# Patient Record
Sex: MALE | Race: WHITE | Employment: OTHER | URBAN - METROPOLITAN AREA
[De-identification: names, ages, dates, MRNs, and addresses within clinical notes are randomized per-mention and may not be internally consistent; named-entity substitution may affect disease eponyms.]

---

## 2018-10-20 ENCOUNTER — APPOINTMENT (OUTPATIENT)
Dept: CT IMAGING | Age: 83
End: 2018-10-20
Payer: MEDICARE

## 2018-10-20 ENCOUNTER — HOSPITAL ENCOUNTER (OUTPATIENT)
Age: 83
Setting detail: OBSERVATION
Discharge: HOME | End: 2018-10-22
Attending: EMERGENCY MEDICINE | Admitting: FAMILY MEDICINE
Payer: MEDICARE

## 2018-10-20 ENCOUNTER — APPOINTMENT (OUTPATIENT)
Dept: GENERAL RADIOLOGY | Age: 83
End: 2018-10-20
Payer: MEDICARE

## 2018-10-20 DIAGNOSIS — R07.9 CHEST PAIN, UNSPECIFIED TYPE: Primary | ICD-10-CM

## 2018-10-20 LAB
ANION GAP SERPL CALCULATED.3IONS-SCNC: 13 MMOL/L (ref 7–16)
APTT: 28.3 SEC (ref 24.5–35.1)
BASOPHILS ABSOLUTE: 0.04 E9/L (ref 0–0.2)
BASOPHILS RELATIVE PERCENT: 0.5 % (ref 0–2)
BUN BLDV-MCNC: 13 MG/DL (ref 8–23)
CALCIUM SERPL-MCNC: 8.7 MG/DL (ref 8.6–10.2)
CHLORIDE BLD-SCNC: 105 MMOL/L (ref 98–107)
CO2: 24 MMOL/L (ref 22–29)
CREAT SERPL-MCNC: 1.2 MG/DL (ref 0.7–1.2)
EKG ATRIAL RATE: 60 BPM
EKG P AXIS: 66 DEGREES
EKG P-R INTERVAL: 188 MS
EKG Q-T INTERVAL: 526 MS
EKG QRS DURATION: 196 MS
EKG QTC CALCULATION (BAZETT): 526 MS
EKG R AXIS: -59 DEGREES
EKG T AXIS: 96 DEGREES
EKG VENTRICULAR RATE: 60 BPM
EOSINOPHILS ABSOLUTE: 0.26 E9/L (ref 0.05–0.5)
EOSINOPHILS RELATIVE PERCENT: 3.6 % (ref 0–6)
GFR AFRICAN AMERICAN: >60
GFR NON-AFRICAN AMERICAN: 58 ML/MIN/1.73
GLUCOSE BLD-MCNC: 153 MG/DL (ref 74–109)
HCT VFR BLD CALC: 40.2 % (ref 37–54)
HEMOGLOBIN: 13 G/DL (ref 12.5–16.5)
IMMATURE GRANULOCYTES #: 0.01 E9/L
IMMATURE GRANULOCYTES %: 0.1 % (ref 0–5)
INR BLD: 1.1
LYMPHOCYTES ABSOLUTE: 2.43 E9/L (ref 1.5–4)
LYMPHOCYTES RELATIVE PERCENT: 33.4 % (ref 20–42)
MCH RBC QN AUTO: 29.7 PG (ref 26–35)
MCHC RBC AUTO-ENTMCNC: 32.3 % (ref 32–34.5)
MCV RBC AUTO: 92 FL (ref 80–99.9)
METER GLUCOSE: 131 MG/DL (ref 70–110)
METER GLUCOSE: 150 MG/DL (ref 70–110)
MONOCYTES ABSOLUTE: 0.69 E9/L (ref 0.1–0.95)
MONOCYTES RELATIVE PERCENT: 9.5 % (ref 2–12)
NEUTROPHILS ABSOLUTE: 3.85 E9/L (ref 1.8–7.3)
NEUTROPHILS RELATIVE PERCENT: 52.9 % (ref 43–80)
PDW BLD-RTO: 13 FL (ref 11.5–15)
PLATELET # BLD: 205 E9/L (ref 130–450)
PMV BLD AUTO: 9.8 FL (ref 7–12)
POTASSIUM SERPL-SCNC: 3.6 MMOL/L (ref 3.5–5)
PRO-BNP: 1205 PG/ML (ref 0–450)
PROTHROMBIN TIME: 12 SEC (ref 9.3–12.4)
RBC # BLD: 4.37 E12/L (ref 3.8–5.8)
SODIUM BLD-SCNC: 142 MMOL/L (ref 132–146)
TROPONIN: 0.08 NG/ML (ref 0–0.03)
TROPONIN: <0.01 NG/ML (ref 0–0.03)
WBC # BLD: 7.3 E9/L (ref 4.5–11.5)

## 2018-10-20 PROCEDURE — G0378 HOSPITAL OBSERVATION PER HR: HCPCS

## 2018-10-20 PROCEDURE — 6370000000 HC RX 637 (ALT 250 FOR IP): Performed by: FAMILY MEDICINE

## 2018-10-20 PROCEDURE — 36415 COLL VENOUS BLD VENIPUNCTURE: CPT

## 2018-10-20 PROCEDURE — 80048 BASIC METABOLIC PNL TOTAL CA: CPT

## 2018-10-20 PROCEDURE — 99285 EMERGENCY DEPT VISIT HI MDM: CPT

## 2018-10-20 PROCEDURE — 83880 ASSAY OF NATRIURETIC PEPTIDE: CPT

## 2018-10-20 PROCEDURE — 84484 ASSAY OF TROPONIN QUANT: CPT

## 2018-10-20 PROCEDURE — 71045 X-RAY EXAM CHEST 1 VIEW: CPT

## 2018-10-20 PROCEDURE — 93005 ELECTROCARDIOGRAM TRACING: CPT | Performed by: INTERNAL MEDICINE

## 2018-10-20 PROCEDURE — 6360000002 HC RX W HCPCS: Performed by: FAMILY MEDICINE

## 2018-10-20 PROCEDURE — 96372 THER/PROPH/DIAG INJ SC/IM: CPT

## 2018-10-20 PROCEDURE — 6360000002 HC RX W HCPCS: Performed by: INTERNAL MEDICINE

## 2018-10-20 PROCEDURE — 2580000003 HC RX 258: Performed by: RADIOLOGY

## 2018-10-20 PROCEDURE — 85025 COMPLETE CBC W/AUTO DIFF WBC: CPT

## 2018-10-20 PROCEDURE — 85730 THROMBOPLASTIN TIME PARTIAL: CPT

## 2018-10-20 PROCEDURE — 93010 ELECTROCARDIOGRAM REPORT: CPT | Performed by: INTERNAL MEDICINE

## 2018-10-20 PROCEDURE — 96374 THER/PROPH/DIAG INJ IV PUSH: CPT

## 2018-10-20 PROCEDURE — 85610 PROTHROMBIN TIME: CPT

## 2018-10-20 PROCEDURE — 6360000004 HC RX CONTRAST MEDICATION: Performed by: RADIOLOGY

## 2018-10-20 PROCEDURE — 71275 CT ANGIOGRAPHY CHEST: CPT

## 2018-10-20 PROCEDURE — 82962 GLUCOSE BLOOD TEST: CPT

## 2018-10-20 RX ORDER — ACETAMINOPHEN 325 MG/1
650 TABLET ORAL EVERY 4 HOURS PRN
Status: DISCONTINUED | OUTPATIENT
Start: 2018-10-20 | End: 2018-10-22 | Stop reason: HOSPADM

## 2018-10-20 RX ORDER — ATORVASTATIN CALCIUM 80 MG/1
80 TABLET, FILM COATED ORAL NIGHTLY
COMMUNITY

## 2018-10-20 RX ORDER — CHOLECALCIFEROL (VITAMIN D3) 125 MCG
500 CAPSULE ORAL DAILY
COMMUNITY

## 2018-10-20 RX ORDER — FUROSEMIDE 10 MG/ML
20 INJECTION INTRAMUSCULAR; INTRAVENOUS ONCE
Status: COMPLETED | OUTPATIENT
Start: 2018-10-20 | End: 2018-10-20

## 2018-10-20 RX ORDER — MYCOPHENOLATE MOFETIL 250 MG/1
CAPSULE ORAL 2 TIMES DAILY
COMMUNITY

## 2018-10-20 RX ORDER — SODIUM CHLORIDE 0.9 % (FLUSH) 0.9 %
10 SYRINGE (ML) INJECTION ONCE
Status: COMPLETED | OUTPATIENT
Start: 2018-10-20 | End: 2018-10-20

## 2018-10-20 RX ORDER — MYCOPHENOLATE MOFETIL 250 MG/1
500 CAPSULE ORAL 2 TIMES DAILY
Status: DISCONTINUED | OUTPATIENT
Start: 2018-10-20 | End: 2018-10-22 | Stop reason: HOSPADM

## 2018-10-20 RX ORDER — TAMSULOSIN HYDROCHLORIDE 0.4 MG/1
0.4 CAPSULE ORAL DAILY
Status: DISCONTINUED | OUTPATIENT
Start: 2018-10-20 | End: 2018-10-22 | Stop reason: HOSPADM

## 2018-10-20 RX ORDER — CLOPIDOGREL BISULFATE 75 MG/1
75 TABLET ORAL DAILY
Status: DISCONTINUED | OUTPATIENT
Start: 2018-10-20 | End: 2018-10-22 | Stop reason: HOSPADM

## 2018-10-20 RX ORDER — LANOLIN ALCOHOL/MO/W.PET/CERES
800 CREAM (GRAM) TOPICAL DAILY
COMMUNITY

## 2018-10-20 RX ORDER — CLOPIDOGREL BISULFATE 75 MG/1
75 TABLET ORAL DAILY
COMMUNITY

## 2018-10-20 RX ORDER — PANTOPRAZOLE SODIUM 40 MG/1
40 TABLET, DELAYED RELEASE ORAL
Status: DISCONTINUED | OUTPATIENT
Start: 2018-10-21 | End: 2018-10-22 | Stop reason: HOSPADM

## 2018-10-20 RX ORDER — ATORVASTATIN CALCIUM 40 MG/1
80 TABLET, FILM COATED ORAL NIGHTLY
Status: DISCONTINUED | OUTPATIENT
Start: 2018-10-20 | End: 2018-10-22 | Stop reason: HOSPADM

## 2018-10-20 RX ORDER — CHOLECALCIFEROL (VITAMIN D3) 50 MCG
5000 TABLET ORAL DAILY
Status: DISCONTINUED | OUTPATIENT
Start: 2018-10-20 | End: 2018-10-22 | Stop reason: HOSPADM

## 2018-10-20 RX ORDER — FOLIC ACID 1 MG/1
1000 TABLET ORAL DAILY
Status: DISCONTINUED | OUTPATIENT
Start: 2018-10-20 | End: 2018-10-22 | Stop reason: HOSPADM

## 2018-10-20 RX ORDER — FINASTERIDE 5 MG/1
5 TABLET, FILM COATED ORAL DAILY
Status: DISCONTINUED | OUTPATIENT
Start: 2018-10-20 | End: 2018-10-22 | Stop reason: HOSPADM

## 2018-10-20 RX ORDER — LANOLIN ALCOHOL/MO/W.PET/CERES
500 CREAM (GRAM) TOPICAL DAILY
Status: DISCONTINUED | OUTPATIENT
Start: 2018-10-20 | End: 2018-10-22 | Stop reason: HOSPADM

## 2018-10-20 RX ORDER — M-VIT,TX,IRON,MINS/CALC/FOLIC 27MG-0.4MG
1 TABLET ORAL DAILY
Status: DISCONTINUED | OUTPATIENT
Start: 2018-10-20 | End: 2018-10-22 | Stop reason: HOSPADM

## 2018-10-20 RX ORDER — ASPIRIN 81 MG/1
81 TABLET, CHEWABLE ORAL DAILY
Status: DISCONTINUED | OUTPATIENT
Start: 2018-10-20 | End: 2018-10-22 | Stop reason: HOSPADM

## 2018-10-20 RX ADMIN — CLOPIDOGREL BISULFATE 75 MG: 75 TABLET ORAL at 15:34

## 2018-10-20 RX ADMIN — INSULIN LISPRO 1 UNITS: 100 INJECTION, SOLUTION INTRAVENOUS; SUBCUTANEOUS at 21:56

## 2018-10-20 RX ADMIN — Medication 5000 UNITS: at 15:34

## 2018-10-20 RX ADMIN — Medication 500 MCG: at 15:35

## 2018-10-20 RX ADMIN — FUROSEMIDE 20 MG: 10 INJECTION, SOLUTION INTRAMUSCULAR; INTRAVENOUS at 15:38

## 2018-10-20 RX ADMIN — Medication 1000 MCG: at 15:34

## 2018-10-20 RX ADMIN — DESMOPRESSIN ACETATE 80 MG: 0.2 TABLET ORAL at 21:50

## 2018-10-20 RX ADMIN — ENOXAPARIN SODIUM 40 MG: 40 INJECTION SUBCUTANEOUS at 14:15

## 2018-10-20 RX ADMIN — MULTIPLE VITAMINS W/ MINERALS TAB 1 TABLET: TAB at 15:34

## 2018-10-20 RX ADMIN — METOPROLOL TARTRATE 25 MG: 25 TABLET ORAL at 15:34

## 2018-10-20 RX ADMIN — TAMSULOSIN HYDROCHLORIDE 0.4 MG: 0.4 CAPSULE ORAL at 21:52

## 2018-10-20 RX ADMIN — Medication 10 ML: at 09:25

## 2018-10-20 RX ADMIN — ACETAMINOPHEN 650 MG: 325 TABLET ORAL at 14:15

## 2018-10-20 RX ADMIN — FINASTERIDE 5 MG: 5 TABLET, FILM COATED ORAL at 15:33

## 2018-10-20 RX ADMIN — ASPIRIN 81 MG CHEWABLE TABLET 81 MG: 81 TABLET CHEWABLE at 15:34

## 2018-10-20 RX ADMIN — MYCOPHENOLATE MOFETIL 500 MG: 250 CAPSULE ORAL at 15:35

## 2018-10-20 RX ADMIN — MYCOPHENOLATE MOFETIL 500 MG: 250 CAPSULE ORAL at 21:50

## 2018-10-20 RX ADMIN — IOPAMIDOL 65 ML: 755 INJECTION, SOLUTION INTRAVENOUS at 09:25

## 2018-10-20 ASSESSMENT — PAIN DESCRIPTION - ORIENTATION
ORIENTATION: MID
ORIENTATION: MID
ORIENTATION: RIGHT;LEFT

## 2018-10-20 ASSESSMENT — PAIN SCALES - GENERAL
PAINLEVEL_OUTOF10: 3
PAINLEVEL_OUTOF10: 7
PAINLEVEL_OUTOF10: 0
PAINLEVEL_OUTOF10: 0
PAINLEVEL_OUTOF10: 7
PAINLEVEL_OUTOF10: 0
PAINLEVEL_OUTOF10: 7
PAINLEVEL_OUTOF10: 7

## 2018-10-20 ASSESSMENT — ENCOUNTER SYMPTOMS
RHINORRHEA: 0
TROUBLE SWALLOWING: 0
EYE REDNESS: 0
EYE PAIN: 0
CHEST TIGHTNESS: 0
WHEEZING: 0
ABDOMINAL PAIN: 0
SORE THROAT: 0
ABDOMINAL DISTENTION: 0
DIARRHEA: 0
CONSTIPATION: 0
SHORTNESS OF BREATH: 1
SINUS PRESSURE: 0
COUGH: 0
PHOTOPHOBIA: 0
BLOOD IN STOOL: 0
NAUSEA: 1
BACK PAIN: 0
VOMITING: 0

## 2018-10-20 ASSESSMENT — PAIN DESCRIPTION - FREQUENCY
FREQUENCY: CONTINUOUS

## 2018-10-20 ASSESSMENT — PAIN DESCRIPTION - DESCRIPTORS
DESCRIPTORS: ACHING

## 2018-10-20 ASSESSMENT — PAIN DESCRIPTION - LOCATION
LOCATION: HEAD
LOCATION: ARM;NECK;SHOULDER
LOCATION: HEAD

## 2018-10-20 ASSESSMENT — PAIN DESCRIPTION - PAIN TYPE
TYPE: ACUTE PAIN

## 2018-10-20 ASSESSMENT — PAIN DESCRIPTION - ONSET: ONSET: SUDDEN

## 2018-10-20 ASSESSMENT — PAIN DESCRIPTION - PROGRESSION: CLINICAL_PROGRESSION: GRADUALLY WORSENING

## 2018-10-20 NOTE — CONSULTS
Inpatient Cardiology Consultation      Reason for Consult:  Chest pain    Consulting Physician: Dr. Layton [facility]    Requesting Physician:  Dr. Matthias Ortega    Date of Consultation: 10/20/2018    History of Present Illness:    Mr. Kendrick Lopez is an 81year old gentleman who is previously unknown to our practice; his primary cardiologist is Dr. Rakesh Lee at [facility]. He has a past medical history of aortic stenosis s/p bioprosthetic AVR and single vessel bypass in 2015; dual chamber pacemaker implant in 2016; hypertension; hyperlipidemia; diabetes mellitus; and TIA. He is typically active with yard work, and reports that he sometimes is outside performing chores for three hours before stopping due to fatigue and dyspnea; however, he sometimes uses a cane due to arthritic discomfort and poor balance. He denies chest discomfort with his regular activities. Yesterday, he had some \"discomfort\" near the bottom of his sternotomy incision, which occurred at rest and lasted a few minutes before resolving spontaneously. He was able to attend a football game last night, and felt well. This morning, upon waking he urinated a great deal, and became lightheaded, feeling as though he \"was going to faint\". He then began having discomfort in the center of his chest, both shoulders and upper arms to his elbows. He returned to bed, and the lightheadedness improved but he continued to have the discomfort, which was made worse with movement. When his wife returned to the room, he was diaphoretic and sweating through his T shirt. She called EMS, and he was hypertensive on their arrival (by his wife's account his systolic blood pressure was over 200). His chest discomfort had mostly resolved before their arrival, and lasted about thirty minutes. In the ED, initial troponin was negative and CTA of the chest was negative for PE. He was admitted to telemetry, and [facility] was consulted regarding his chest pain.  Currently, Mr. Kendrick Lopez is resting in bed embolism in 2012 (? Unprovoked). He was treated with warfarin for six months. 11. 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 Basal cell carcinoma    Medications Prior to Admit:  Prior to Admission medications    Medication Sig Start Date End Date Taking?  Authorizing Provider   atorvastatin (LIPITOR) 80 MG tablet Take 80 mg by mouth nightly   Yes Historical Provider, MD   vitamin B-12 (CYANOCOBALAMIN) 500 MCG tablet Take 500 mcg by mouth daily   Yes Historical Provider, MD   mycophenolate (CELLCEPT) 250 MG capsule Take by mouth 2 times daily 500mg in the morning and 250mg at night   Yes Historical Provider, MD   folic acid (FOLVITE) 946 MCG tablet Take 800 mcg by mouth daily   Yes Historical Provider, MD   clopidogrel (PLAVIX) 75 MG tablet Take 75 mg by mouth daily   Yes Historical Provider, MD   aspirin 81 MG tablet Take 81 mg by mouth daily   Yes Historical Provider, MD   vitamin D-3 (CHOLECALCIFEROL) 5000 UNITS TABS Take 5,000 Units by mouth daily   Yes Historical Provider, MD   finasteride (PROSCAR) 5 MG tablet Take 5 mg by mouth daily   Yes Historical Provider, MD   metFORMIN (GLUCOPHAGE) 500 MG tablet Take 500 mg by mouth 2 times daily (with meals)   Yes Historical Provider, MD   metoprolol (LOPRESSOR) 25 MG tablet Take 25 mg by mouth 2 times daily   Yes Historical Provider, MD   omeprazole (PRILOSEC) 20 MG capsule Take 20 mg by mouth 2 times daily   Yes Historical Provider, MD   tamsulosin (FLOMAX) 0.4 MG capsule Take 0.4 mg by mouth daily   Yes Historical Provider, MD   Multiple Vitamins-Minerals (THERAPEUTIC MULTIVITAMIN-MINERALS) tablet Take 1 tablet by mouth daily   Yes Historical Provider, MD       Current Medications:    Current Facility-Administered Medications: enoxaparin (LOVENOX) injection 40 mg, 40 mg, Subcutaneous, Daily  insulin lispro (HUMALOG) injection vial 0-6 Units, 0-6 Units, Subcutaneous, TID WC  insulin lispro (HUMALOG) injection vial 0-3 Units, along with that of neck, shoulder and arm discomfort of a prolonged nature with initial normal cardiac biomarkers. In addition an episode of near syncope potentially of a vasodepressor mediated origin was suggested. Objective basis, his chest x-ray suggests that of mild interstitial infiltrates potentially compatible with that of congestive heart failure. On this basis, a proBNP will be obtained along with a single dose of diuretics with repeat cardiac biomarkers to be obtained to further evaluate the presence or absence of a potential ischemic syndrome. In addition based on his near syncopal episode in light of the presence of a pacemaker, this will be interrogated utilizing the [employer] consult system to determine the presence or absence of arrhythmia related events necessitating further evaluation. On the basis of his symptoms and pacemaker dependence, an echocardiogram will be obtained to assess left ventricular systolic function to guide any additional needs of alteration of his management.     Thank you for allowing me to participate in your patient's care. Please feel free to contact me if you have any questions or concerns.     Armando Hussein [place], [facility]

## 2018-10-20 NOTE — CONSULTS
dose of diuretics with repeat cardiac biomarkers to be obtained to further evaluate the presence or absence of a potential ischemic syndrome. In addition based on his near syncopal episode in light of the presence of a pacemaker, this will be interrogated utilizing the [email or web address] consult system to determine the presence or absence of arrhythmia related events necessitating further evaluation. On the basis of his symptoms and pacemaker dependence, an echocardiogram will be obtained to assess left ventricular systolic function to guide any additional needs of alteration of his management. Thank you for allowing me to participate in your patient's care. Please feel free to contact me if you have any questions or concerns. Pérez Ruvalcaba.  Pradip Peres, [address] [facility]

## 2018-10-20 NOTE — ED PROVIDER NOTES
admit patient for further evaluation and treatment. Repeat exam prior to admission unchanged, patient resting in bed in no acute distress with wife at bedside, no new complaints prior to admission. 9:47 AM-- patient resting comfortably in bed in no acute distress. Patient with no active chest pain. On repeat exam, no change. 10:31 AM-- patient resting comfortably in bed in no acute distress, wife at bedside. No new complaints at this time. 11:42 AM--patient with no active chest pain while in ED, CTA chest no evidence of pulmonary embolism. --------------------------------------------- PAST HISTORY ---------------------------------------------  Past Medical History:  has a past medical history of Diabetes mellitus ([facility] [address] Elevated cholesterol; Hypertension; and Prostate enlargement. Past Surgical History:  has a past surgical history that includes Cardiac surgery (07/06/2015). Social History:  reports that he has quit smoking. He has never used smokeless tobacco. He reports that he drinks alcohol. He reports that he does not use drugs. Family History: family history is not on file. The patients home medications have been reviewed. Allergies: Patient has no known allergies.     -------------------------------------------------- RESULTS -------------------------------------------------    LABS:  Results for orders placed or performed during the hospital encounter of 10/20/18   CBC auto differential   Result Value Ref Range    WBC 7.3 4.5 - 11.5 E9/L    RBC 4.37 3.80 - 5.80 E12/L    Hemoglobin 13.0 12.5 - 16.5 g/dL    Hematocrit 40.2 37.0 - 54.0 %    MCV 92.0 80.0 - 99.9 fL    MCH 29.7 26.0 - 35.0 pg    MCHC 32.3 32.0 - 34.5 %    RDW 13.0 11.5 - 15.0 fL    Platelets 356 337 - 240 E9/L    MPV 9.8 7.0 - 12.0 fL    Neutrophils % 52.9 43.0 - 80.0 %    Immature Granulocytes % 0.1 0.0 - 5.0 %    Lymphocytes % 33.4 20.0 - 42.0 %    Monocytes % 9.5 2.0 - 12.0 %    Eosinophils % 3.6 0.0 - 6.0 % Basophils % 0.5 0.0 - 2.0 %    Neutrophils # 3.85 1.80 - 7.30 E9/L    Immature Granulocytes # 0.01 E9/L    Lymphocytes # 2.43 1.50 - 4.00 E9/L    Monocytes # 0.69 0.10 - 0.95 E9/L    Eosinophils # 0.26 0.05 - 0.50 E9/L    Basophils # 0.04 0.00 - 0.20 Q9/C   Basic Metabolic Panel   Result Value Ref Range    Sodium 142 132 - 146 mmol/L    Potassium 3.6 3.5 - 5.0 mmol/L    Chloride 105 98 - 107 mmol/L    CO2 24 22 - 29 mmol/L    Anion Gap 13 7 - 16 mmol/L    Glucose 153 (H) 74 - 109 mg/dL    BUN 13 8 - 23 mg/dL    CREATININE 1.2 0.7 - 1.2 mg/dL    GFR Non-African American 58 >=60 mL/min/1.73    GFR African American >60     Calcium 8.7 8.6 - 10.2 mg/dL   Troponin   Result Value Ref Range    Troponin <0.01 0.00 - 0.03 ng/mL   Protime-INR   Result Value Ref Range    Protime 12.0 9.3 - 12.4 sec    INR 1.1    APTT   Result Value Ref Range    aPTT 28.3 24.5 - 35.1 sec       RADIOLOGY:  CTA CHEST W CONTRAST   Final Result   the kidneys representing renal cysts. IMPRESSION:   1. No evidence to suggest pulmonary arterial emboli. 2. Cardiomegaly. Mild perihilar vascular congestion. Status post prior   CABG and aortic valve replacement. 3. Mild to moderate consolidation and airspace opacities of the left   lower lobe could represent atelectasis or infiltrates. 4. Left chest wall cardiac pacer. XR CHEST PORTABLE   Final Result   Hazy airspace disease at the lung fields bilaterally. Recommend clinical correlation. EKG: This EKG is signed and interpreted by me. Rate: 60  Rhythm: AV dual paced rhythm  Interpretation: No acute ST or T-wave morphology changes  Comparison: No previous EKG for comparison    ------------------------- NURSING NOTES AND VITALS REVIEWED ---------------------------  Date / Time Roomed:  10/20/2018  7:34 AM  ED Bed Assignment:  20/20    The nursing notes within the ED encounter and vital signs as below have been reviewed.      Patient Vitals for the past 24 hrs:   BP Temp Temp src Pulse Resp SpO2 Height Weight   10/20/18 1043 (!) 147/68 - - 60 18 98 % - -   10/20/18 0858 122/63 - - 60 18 97 % - -   10/20/18 0738 (!) 168/84 97.6 °F (36.4 °C) Oral 62 18 99 % 5' 6\" (1.676 m) 190 lb (86.2 kg)       Oxygen Saturation Interpretation: Normal    ------------------------------------------ PROGRESS NOTES ------------------------------------------  Re-evaluation(s):  Time: 10:01 AM  Patients symptoms show no change  Repeat physical examination is not changed    Counseling:  I have spoken with the patient and discussed todays results, in addition to providing specific details for the plan of care and counseling regarding the diagnosis and prognosis. Their questions are answered at this time and they are agreeable with the plan of admission.    --------------------------------- ADDITIONAL PROVIDER NOTES ---------------------------------  Consultations:  Time: 12:03 PM. Spoke with Dr. Fatimah Beck. Discussed case. They will admit the patient. This patient's ED course included: a personal history and physicial examination, re-evaluation prior to disposition, multiple bedside re-evaluations, cardiac monitoring and continuous pulse oximetry    This patient has remained hemodynamically stable during their ED course. Diagnosis:  1. Chest pain, unspecified type        Disposition:  Patient's disposition: Admit to telemetry  Patient's condition is stable.          Stephen Boyd DO  Resident  10/20/18 8863

## 2018-10-21 ENCOUNTER — APPOINTMENT (OUTPATIENT)
Dept: NUCLEAR MEDICINE | Age: 83
End: 2018-10-21
Payer: MEDICARE

## 2018-10-21 LAB
LV EF: 49 %
LVEF MODALITY: NORMAL
METER GLUCOSE: 115 MG/DL (ref 70–110)
METER GLUCOSE: 117 MG/DL (ref 70–110)
METER GLUCOSE: 127 MG/DL (ref 70–110)
METER GLUCOSE: 184 MG/DL (ref 70–110)
TROPONIN: 0.08 NG/ML (ref 0–0.03)

## 2018-10-21 PROCEDURE — 6370000000 HC RX 637 (ALT 250 FOR IP): Performed by: FAMILY MEDICINE

## 2018-10-21 PROCEDURE — 84484 ASSAY OF TROPONIN QUANT: CPT

## 2018-10-21 PROCEDURE — 99215 OFFICE O/P EST HI 40 MIN: CPT | Performed by: INTERNAL MEDICINE

## 2018-10-21 PROCEDURE — G0378 HOSPITAL OBSERVATION PER HR: HCPCS

## 2018-10-21 PROCEDURE — 6360000002 HC RX W HCPCS: Performed by: FAMILY MEDICINE

## 2018-10-21 PROCEDURE — A9500 TC99M SESTAMIBI: HCPCS | Performed by: RADIOLOGY

## 2018-10-21 PROCEDURE — 78452 HT MUSCLE IMAGE SPECT MULT: CPT

## 2018-10-21 PROCEDURE — 93017 CV STRESS TEST TRACING ONLY: CPT

## 2018-10-21 PROCEDURE — 82962 GLUCOSE BLOOD TEST: CPT

## 2018-10-21 PROCEDURE — 3430000000 HC RX DIAGNOSTIC RADIOPHARMACEUTICAL: Performed by: RADIOLOGY

## 2018-10-21 PROCEDURE — 93018 CV STRESS TEST I&R ONLY: CPT | Performed by: INTERNAL MEDICINE

## 2018-10-21 PROCEDURE — 36415 COLL VENOUS BLD VENIPUNCTURE: CPT

## 2018-10-21 PROCEDURE — 93016 CV STRESS TEST SUPVJ ONLY: CPT | Performed by: INTERNAL MEDICINE

## 2018-10-21 PROCEDURE — 96372 THER/PROPH/DIAG INJ SC/IM: CPT

## 2018-10-21 PROCEDURE — 6360000002 HC RX W HCPCS: Performed by: INTERNAL MEDICINE

## 2018-10-21 RX ORDER — DEXTROSE MONOHYDRATE 25 G/50ML
12.5 INJECTION, SOLUTION INTRAVENOUS PRN
Status: DISCONTINUED | OUTPATIENT
Start: 2018-10-21 | End: 2018-10-22 | Stop reason: HOSPADM

## 2018-10-21 RX ORDER — DEXTROSE MONOHYDRATE 50 MG/ML
100 INJECTION, SOLUTION INTRAVENOUS PRN
Status: DISCONTINUED | OUTPATIENT
Start: 2018-10-21 | End: 2018-10-22 | Stop reason: HOSPADM

## 2018-10-21 RX ORDER — NICOTINE POLACRILEX 4 MG
15 LOZENGE BUCCAL PRN
Status: DISCONTINUED | OUTPATIENT
Start: 2018-10-21 | End: 2018-10-22 | Stop reason: HOSPADM

## 2018-10-21 RX ADMIN — ASPIRIN 81 MG CHEWABLE TABLET 81 MG: 81 TABLET CHEWABLE at 09:05

## 2018-10-21 RX ADMIN — MYCOPHENOLATE MOFETIL 500 MG: 250 CAPSULE ORAL at 09:05

## 2018-10-21 RX ADMIN — Medication 500 MCG: at 09:04

## 2018-10-21 RX ADMIN — REGADENOSON 0.4 MG: 0.08 INJECTION, SOLUTION INTRAVENOUS at 12:40

## 2018-10-21 RX ADMIN — ENOXAPARIN SODIUM 40 MG: 40 INJECTION SUBCUTANEOUS at 09:15

## 2018-10-21 RX ADMIN — TAMSULOSIN HYDROCHLORIDE 0.4 MG: 0.4 CAPSULE ORAL at 21:39

## 2018-10-21 RX ADMIN — FINASTERIDE 5 MG: 5 TABLET, FILM COATED ORAL at 09:04

## 2018-10-21 RX ADMIN — DESMOPRESSIN ACETATE 80 MG: 0.2 TABLET ORAL at 21:39

## 2018-10-21 RX ADMIN — METOPROLOL TARTRATE 25 MG: 25 TABLET ORAL at 09:04

## 2018-10-21 RX ADMIN — MULTIPLE VITAMINS W/ MINERALS TAB 1 TABLET: TAB at 09:05

## 2018-10-21 RX ADMIN — Medication 5000 UNITS: at 09:04

## 2018-10-21 RX ADMIN — METOPROLOL TARTRATE 25 MG: 25 TABLET ORAL at 21:39

## 2018-10-21 RX ADMIN — PANTOPRAZOLE SODIUM 40 MG: 40 TABLET, DELAYED RELEASE ORAL at 06:25

## 2018-10-21 RX ADMIN — MYCOPHENOLATE MOFETIL 500 MG: 250 CAPSULE ORAL at 21:39

## 2018-10-21 RX ADMIN — Medication 12 MILLICURIE: at 10:37

## 2018-10-21 RX ADMIN — Medication 35 MILLICURIE: at 12:41

## 2018-10-21 RX ADMIN — CLOPIDOGREL BISULFATE 75 MG: 75 TABLET ORAL at 09:05

## 2018-10-21 RX ADMIN — Medication 1000 MCG: at 09:04

## 2018-10-21 ASSESSMENT — PAIN SCALES - GENERAL
PAINLEVEL_OUTOF10: 0

## 2018-10-21 ASSESSMENT — ENCOUNTER SYMPTOMS
BLURRED VISION: 0
ABDOMINAL PAIN: 0
SHORTNESS OF BREATH: 0

## 2018-10-21 NOTE — H&P
500 mcg Oral Daily Tiffanie Xiong MD   500 mcg at 10/20/18 1535    vitamin D tablet 5,000 Units  5,000 Units Oral Daily Tiffanie Xiong MD   5,000 Units at 10/20/18 1534    enoxaparin (LOVENOX) injection 40 mg  40 mg Subcutaneous Daily Tiffanie Xiong MD   40 mg at 10/20/18 1415    insulin lispro (HUMALOG) injection vial 0-6 Units  0-6 Units Subcutaneous TID WC Tiffanie Xiong MD        insulin lispro (HUMALOG) injection vial 0-3 Units  0-3 Units Subcutaneous Nightly Tiffanie Xiong MD   1 Units at 10/20/18 2156    acetaminophen (TYLENOL) tablet 650 mg  650 mg Oral Q4H PRN Tiffanie Xiong MD   650 mg at 10/20/18 1415    perflutren lipid microspheres (DEFINITY) injection 1.65 mg  1.5 mL Intravenous ONCE PRN Shaka Maier MD        regadenoson [facility]) injection 0.4 mg  0.4 mg Intravenous ONCE PRN Shaka Maier MD           Allergies: No Known Allergies    Active Problems:    Chest pain  Resolved Problems:    * No resolved hospital problems. *    Blood pressure 131/71, pulse 71, temperature 98.6 °F (37 °C), temperature source Temporal, resp. rate 16, height 5' 6\" (1.676 m), weight 176 lb (79.8 kg), SpO2 95 %. Review of Systems   Constitutional: Negative for chills and fever. HENT: Negative for hearing loss. Eyes: Negative for blurred vision. Respiratory: Negative for shortness of breath. Cardiovascular: Positive for chest pain. Gastrointestinal: Negative for abdominal pain. Genitourinary: Negative for dysuria. Musculoskeletal: Negative for myalgias. Skin: Negative for rash. Neurological: Negative for dizziness. Psychiatric/Behavioral: Negative for depression. Physical Exam   Constitutional: He is oriented to person, place, and time. He appears well-developed. HENT:   Head: Normocephalic. Eyes: Pupils are equal, round, and reactive to light. Conjunctivae are normal.   Neck: Normal range of motion. Neck supple.  No tracheal deviation present. No thyromegaly present. Cardiovascular: Normal rate, regular rhythm and normal heart sounds. Pulmonary/Chest: Effort normal and breath sounds normal. No respiratory distress. He has no wheezes. Abdominal: Soft. Bowel sounds are normal. He exhibits no distension. There is no tenderness. Neurological: He is alert and oriented to person, place, and time. Skin: Skin is warm and dry. Psychiatric: He has a normal mood and affect. His behavior is normal.       Assessment:  Chest pain  CAD  HTN  Hyperlipidemia  DM    Plan:  EKG ok, troponins negative. Cardiology consulted. For stress test today and ECHO. Continue home meds. Further management pending.     Solo Narayan MD  10/21/2018

## 2018-10-22 VITALS
HEART RATE: 73 BPM | SYSTOLIC BLOOD PRESSURE: 160 MMHG | WEIGHT: 176 LBS | DIASTOLIC BLOOD PRESSURE: 76 MMHG | HEIGHT: 66 IN | BODY MASS INDEX: 28.28 KG/M2 | TEMPERATURE: 96 F | RESPIRATION RATE: 18 BRPM | OXYGEN SATURATION: 96 %

## 2018-10-22 LAB
LV EF: 60 %
LVEF MODALITY: NORMAL
METER GLUCOSE: 113 MG/DL (ref 70–110)
METER GLUCOSE: 121 MG/DL (ref 70–110)

## 2018-10-22 PROCEDURE — G0378 HOSPITAL OBSERVATION PER HR: HCPCS

## 2018-10-22 PROCEDURE — 82962 GLUCOSE BLOOD TEST: CPT

## 2018-10-22 PROCEDURE — 6370000000 HC RX 637 (ALT 250 FOR IP): Performed by: FAMILY MEDICINE

## 2018-10-22 PROCEDURE — 99214 OFFICE O/P EST MOD 30 MIN: CPT | Performed by: INTERNAL MEDICINE

## 2018-10-22 PROCEDURE — APPSS30 APP SPLIT SHARED TIME 16-30 MINUTES: Performed by: NURSE PRACTITIONER

## 2018-10-22 PROCEDURE — 93306 TTE W/DOPPLER COMPLETE: CPT

## 2018-10-22 PROCEDURE — 6360000002 HC RX W HCPCS: Performed by: FAMILY MEDICINE

## 2018-10-22 PROCEDURE — 96372 THER/PROPH/DIAG INJ SC/IM: CPT

## 2018-10-22 RX ADMIN — METOPROLOL TARTRATE 25 MG: 25 TABLET ORAL at 09:05

## 2018-10-22 RX ADMIN — ASPIRIN 81 MG CHEWABLE TABLET 81 MG: 81 TABLET CHEWABLE at 09:05

## 2018-10-22 RX ADMIN — PANTOPRAZOLE SODIUM 40 MG: 40 TABLET, DELAYED RELEASE ORAL at 06:26

## 2018-10-22 RX ADMIN — ENOXAPARIN SODIUM 40 MG: 40 INJECTION SUBCUTANEOUS at 09:02

## 2018-10-22 RX ADMIN — CLOPIDOGREL BISULFATE 75 MG: 75 TABLET ORAL at 09:04

## 2018-10-22 RX ADMIN — MULTIPLE VITAMINS W/ MINERALS TAB 1 TABLET: TAB at 09:04

## 2018-10-22 RX ADMIN — MYCOPHENOLATE MOFETIL 500 MG: 250 CAPSULE ORAL at 09:05

## 2018-10-22 RX ADMIN — Medication 1000 MCG: at 09:04

## 2018-10-22 RX ADMIN — FINASTERIDE 5 MG: 5 TABLET, FILM COATED ORAL at 09:02

## 2018-10-22 RX ADMIN — Medication 5000 UNITS: at 09:04

## 2018-10-22 RX ADMIN — Medication 500 MCG: at 09:03

## 2018-10-22 ASSESSMENT — PAIN SCALES - GENERAL
PAINLEVEL_OUTOF10: 0
PAINLEVEL_OUTOF10: 0

## 2018-10-22 NOTE — DISCHARGE SUMMARY
DISCHARGE SUMMARY    Patient admitted for chest pain. Patient had normal EKG and cardiac enzymes. Stress test also negative. Patient had ECHO which was stable. Follows with Cardiology up at [facility]. Discharged to home in stable condition.

## 2018-10-22 NOTE — PROGRESS NOTES
Perfusion images were reviewed. No evidence of perfusion abnormalities were present with left ventricular systolic function the lower limits of normal and suggesting a low risk of ischemic events. An echocardiogram is pending for further evaluation of left ventricular systolic and diastolic cardiac performance. Presently, medical therapy remains advisable with no additional needs of coronary angiography to further evaluate progression of his coronary atherosclerosis.
10/20/18   0800   WBC  7.3   HGB  13.0   HCT  40.2   PLT  205     BMP: Recent Labs      10/20/18   0800   NA  142   K  3.6   CO2  24   BUN  13   CREATININE  1.2   LABGLOM  58   CALCIUM  8.7     PT/INR:   Recent Labs      10/20/18   0800   PROTIME  12.0   INR  1.1     APTT:  Recent Labs      10/20/18   0800   APTT  28.3     CARDIAC ENZYMES:  Recent Labs      10/20/18   0800  10/20/18   2009  10/21/18   0509   TROPONINI  <0.01  0.08*  0.08*       CTA chest 10/20/2018: Impression:  1. No evidence to suggest pulmonary arterial emboli. 2. Cardiomegaly. Mild perihilar vascular congestion. Status post prior CABG and aortic valve replacement. 3. Mild to moderate consolidation and airspace opacities of the left lower lobe could represent atelectasis or infiltrates. 4. Left chest wall cardiac pacer.     CXR 10/20/2018: Impression:       Hazy airspace disease at the lung fields bilaterally. Telemetry: V-paced. TTE: ([facility]) 8/27/2018: The left ventricle is normal in size. There is moderate concentric left ventricular hypertrophy. Left ventricular systolic function is normal. EF = 59 ± 5% (2D biplane) Left ventricular diastolic function was not evaluated due to severe MAC and pacing. The right ventricle is dilated. Right ventricular systolic function is normal. The left atrial cavity is severely dilated. Sonido-Hoffmann prosthetic aortic valve (size #35). There is trivial aortic   valve regurgitation. The peak gradient is 27 mmHg, the mean gradient is 16 mmHg and the dimensionless valve index is 0.48. Lexiscan stress test: 10/21/2018  1.   No evidence of left ventricular myocardial stress-induced   ischemia. EF 49%. Echo: 10/22/2018: pending     ASSESSMENT:   1. Atypical chest pain. No acute EKG changes. CTA negative for PE. Clinically stable. 2. CAD s/p CABG ([facility]) 6/2015. Nonischemic Lexiscan stress test (10/21/2018). 3. Elevated proBNP (1205); clinically appears compensated.   (TTE EF 59% on

## 2018-10-25 LAB
EKG ATRIAL RATE: 60 BPM
EKG P AXIS: 96 DEGREES
EKG P-R INTERVAL: 186 MS
EKG Q-T INTERVAL: 540 MS
EKG QRS DURATION: 204 MS
EKG QTC CALCULATION (BAZETT): 540 MS
EKG R AXIS: -61 DEGREES
EKG T AXIS: 97 DEGREES
EKG VENTRICULAR RATE: 60 BPM

## 2019-01-04 ENCOUNTER — HOSPITAL ENCOUNTER (EMERGENCY)
Age: 84
Discharge: HOME | End: 2019-01-04
Payer: MEDICARE

## 2019-01-04 VITALS
RESPIRATION RATE: 14 BRPM | WEIGHT: 180 LBS | HEIGHT: 66 IN | DIASTOLIC BLOOD PRESSURE: 74 MMHG | SYSTOLIC BLOOD PRESSURE: 185 MMHG | BODY MASS INDEX: 28.93 KG/M2 | OXYGEN SATURATION: 98 % | TEMPERATURE: 97.4 F | HEART RATE: 66 BPM

## 2019-01-04 DIAGNOSIS — Z51.89 VISIT FOR WOUND CHECK: Primary | ICD-10-CM

## 2019-01-04 PROCEDURE — 99282 EMERGENCY DEPT VISIT SF MDM: CPT

## 2019-04-22 ENCOUNTER — HOSPITAL ENCOUNTER (OUTPATIENT)
Age: 84
Setting detail: OBSERVATION
Discharge: HOME | End: 2019-04-23
Attending: EMERGENCY MEDICINE | Admitting: STUDENT IN AN ORGANIZED HEALTH CARE EDUCATION/TRAINING PROGRAM
Payer: MEDICARE

## 2019-04-22 ENCOUNTER — APPOINTMENT (OUTPATIENT)
Dept: CT IMAGING | Age: 84
End: 2019-04-22
Payer: MEDICARE

## 2019-04-22 DIAGNOSIS — R07.2 PRECORDIAL PAIN: Primary | ICD-10-CM

## 2019-04-22 DIAGNOSIS — R07.9 CHEST PAIN, UNSPECIFIED TYPE: ICD-10-CM

## 2019-04-22 LAB
ALBUMIN SERPL-MCNC: 4.1 G/DL (ref 3.5–5.2)
ALP BLD-CCNC: 52 U/L (ref 40–129)
ALT SERPL-CCNC: 14 U/L (ref 0–40)
ANION GAP SERPL CALCULATED.3IONS-SCNC: 10 MMOL/L (ref 7–16)
AST SERPL-CCNC: 19 U/L (ref 0–39)
BASOPHILS ABSOLUTE: 0.02 E9/L (ref 0–0.2)
BASOPHILS RELATIVE PERCENT: 0.4 % (ref 0–2)
BILIRUB SERPL-MCNC: 0.7 MG/DL (ref 0–1.2)
BUN BLDV-MCNC: 13 MG/DL (ref 8–23)
CALCIUM SERPL-MCNC: 9 MG/DL (ref 8.6–10.2)
CHLORIDE BLD-SCNC: 107 MMOL/L (ref 98–107)
CO2: 25 MMOL/L (ref 22–29)
CREAT SERPL-MCNC: 1.1 MG/DL (ref 0.7–1.2)
EKG ATRIAL RATE: 60 BPM
EKG P AXIS: 74 DEGREES
EKG P-R INTERVAL: 186 MS
EKG Q-T INTERVAL: 524 MS
EKG QRS DURATION: 208 MS
EKG QTC CALCULATION (BAZETT): 524 MS
EKG R AXIS: -57 DEGREES
EKG T AXIS: 102 DEGREES
EKG VENTRICULAR RATE: 60 BPM
EOSINOPHILS ABSOLUTE: 0.24 E9/L (ref 0.05–0.5)
EOSINOPHILS RELATIVE PERCENT: 4.7 % (ref 0–6)
GFR AFRICAN AMERICAN: >60
GFR AFRICAN AMERICAN: >60
GFR NON-AFRICAN AMERICAN: >60 ML/MIN/1.73
GFR NON-AFRICAN AMERICAN: >60 ML/MIN/1.73
GLUCOSE BLD-MCNC: 116 MG/DL (ref 74–99)
GLUCOSE BLD-MCNC: 117 MG/DL (ref 74–99)
HCT VFR BLD CALC: 41.2 % (ref 37–54)
HEMOGLOBIN: 13.3 G/DL (ref 12.5–16.5)
IMMATURE GRANULOCYTES #: 0.01 E9/L
IMMATURE GRANULOCYTES %: 0.2 % (ref 0–5)
LYMPHOCYTES ABSOLUTE: 1.73 E9/L (ref 1.5–4)
LYMPHOCYTES RELATIVE PERCENT: 33.9 % (ref 20–42)
MCH RBC QN AUTO: 30.2 PG (ref 26–35)
MCHC RBC AUTO-ENTMCNC: 32.3 % (ref 32–34.5)
MCV RBC AUTO: 93.4 FL (ref 80–99.9)
METER GLUCOSE: 89 MG/DL (ref 74–99)
METER GLUCOSE: 95 MG/DL (ref 74–99)
MONOCYTES ABSOLUTE: 0.65 E9/L (ref 0.1–0.95)
MONOCYTES RELATIVE PERCENT: 12.7 % (ref 2–12)
NEUTROPHILS ABSOLUTE: 2.45 E9/L (ref 1.8–7.3)
NEUTROPHILS RELATIVE PERCENT: 48.1 % (ref 43–80)
PDW BLD-RTO: 13.5 FL (ref 11.5–15)
PERFORMED ON: ABNORMAL
PLATELET # BLD: 190 E9/L (ref 130–450)
PMV BLD AUTO: 9.9 FL (ref 7–12)
POC CHLORIDE: 108 MMOL/L (ref 100–108)
POC CREATININE: 1.1 MG/DL (ref 0.7–1.2)
POC POTASSIUM: 5.5 MMOL/L (ref 3.5–5)
POC SODIUM: 141 MMOL/L (ref 132–146)
POTASSIUM REFLEX MAGNESIUM: 4 MMOL/L (ref 3.5–5)
PROCALCITONIN: 0.05 NG/ML (ref 0–0.08)
RBC # BLD: 4.41 E12/L (ref 3.8–5.8)
SODIUM BLD-SCNC: 142 MMOL/L (ref 132–146)
TOTAL PROTEIN: 6.6 G/DL (ref 6.4–8.3)
TROPONIN: <0.01 NG/ML (ref 0–0.03)
WBC # BLD: 5.1 E9/L (ref 4.5–11.5)

## 2019-04-22 PROCEDURE — 6370000000 HC RX 637 (ALT 250 FOR IP): Performed by: HOSPITALIST

## 2019-04-22 PROCEDURE — 82565 ASSAY OF CREATININE: CPT

## 2019-04-22 PROCEDURE — 84484 ASSAY OF TROPONIN QUANT: CPT

## 2019-04-22 PROCEDURE — 2580000003 HC RX 258: Performed by: RADIOLOGY

## 2019-04-22 PROCEDURE — 99285 EMERGENCY DEPT VISIT HI MDM: CPT

## 2019-04-22 PROCEDURE — 2580000003 HC RX 258: Performed by: STUDENT IN AN ORGANIZED HEALTH CARE EDUCATION/TRAINING PROGRAM

## 2019-04-22 PROCEDURE — 71275 CT ANGIOGRAPHY CHEST: CPT

## 2019-04-22 PROCEDURE — 99223 1ST HOSP IP/OBS HIGH 75: CPT | Performed by: INTERNAL MEDICINE

## 2019-04-22 PROCEDURE — 6360000002 HC RX W HCPCS: Performed by: STUDENT IN AN ORGANIZED HEALTH CARE EDUCATION/TRAINING PROGRAM

## 2019-04-22 PROCEDURE — 85025 COMPLETE CBC W/AUTO DIFF WBC: CPT

## 2019-04-22 PROCEDURE — 84132 ASSAY OF SERUM POTASSIUM: CPT

## 2019-04-22 PROCEDURE — 84145 PROCALCITONIN (PCT): CPT

## 2019-04-22 PROCEDURE — 6360000004 HC RX CONTRAST MEDICATION: Performed by: RADIOLOGY

## 2019-04-22 PROCEDURE — 6370000000 HC RX 637 (ALT 250 FOR IP): Performed by: STUDENT IN AN ORGANIZED HEALTH CARE EDUCATION/TRAINING PROGRAM

## 2019-04-22 PROCEDURE — 36415 COLL VENOUS BLD VENIPUNCTURE: CPT

## 2019-04-22 PROCEDURE — 6370000000 HC RX 637 (ALT 250 FOR IP): Performed by: EMERGENCY MEDICINE

## 2019-04-22 PROCEDURE — 80053 COMPREHEN METABOLIC PANEL: CPT

## 2019-04-22 PROCEDURE — 96372 THER/PROPH/DIAG INJ SC/IM: CPT

## 2019-04-22 PROCEDURE — 82947 ASSAY GLUCOSE BLOOD QUANT: CPT

## 2019-04-22 PROCEDURE — 84295 ASSAY OF SERUM SODIUM: CPT

## 2019-04-22 PROCEDURE — 82435 ASSAY OF BLOOD CHLORIDE: CPT

## 2019-04-22 PROCEDURE — G0378 HOSPITAL OBSERVATION PER HR: HCPCS

## 2019-04-22 PROCEDURE — 93005 ELECTROCARDIOGRAM TRACING: CPT | Performed by: EMERGENCY MEDICINE

## 2019-04-22 PROCEDURE — 82962 GLUCOSE BLOOD TEST: CPT

## 2019-04-22 RX ORDER — SODIUM CHLORIDE 0.9 % (FLUSH) 0.9 %
10 SYRINGE (ML) INJECTION EVERY 12 HOURS SCHEDULED
Status: DISCONTINUED | OUTPATIENT
Start: 2019-04-22 | End: 2019-04-23 | Stop reason: HOSPADM

## 2019-04-22 RX ORDER — ATORVASTATIN CALCIUM 40 MG/1
80 TABLET, FILM COATED ORAL NIGHTLY
Status: DISCONTINUED | OUTPATIENT
Start: 2019-04-22 | End: 2019-04-23 | Stop reason: HOSPADM

## 2019-04-22 RX ORDER — M-VIT,TX,IRON,MINS/CALC/FOLIC 27MG-0.4MG
1 TABLET ORAL DAILY
Status: DISCONTINUED | OUTPATIENT
Start: 2019-04-22 | End: 2019-04-23 | Stop reason: HOSPADM

## 2019-04-22 RX ORDER — NICOTINE POLACRILEX 4 MG
15 LOZENGE BUCCAL PRN
Status: DISCONTINUED | OUTPATIENT
Start: 2019-04-22 | End: 2019-04-23 | Stop reason: HOSPADM

## 2019-04-22 RX ORDER — MAGNESIUM OXIDE 400 MG/1
400 TABLET ORAL DAILY
COMMUNITY

## 2019-04-22 RX ORDER — SODIUM CHLORIDE 0.9 % (FLUSH) 0.9 %
10 SYRINGE (ML) INJECTION PRN
Status: DISCONTINUED | OUTPATIENT
Start: 2019-04-22 | End: 2019-04-23 | Stop reason: HOSPADM

## 2019-04-22 RX ORDER — PANTOPRAZOLE SODIUM 40 MG/1
40 TABLET, DELAYED RELEASE ORAL
Status: DISCONTINUED | OUTPATIENT
Start: 2019-04-23 | End: 2019-04-23 | Stop reason: HOSPADM

## 2019-04-22 RX ORDER — LANOLIN ALCOHOL/MO/W.PET/CERES
800 CREAM (GRAM) TOPICAL DAILY
Status: DISCONTINUED | OUTPATIENT
Start: 2019-04-22 | End: 2019-04-22 | Stop reason: CLARIF

## 2019-04-22 RX ORDER — SODIUM CHLORIDE 0.9 % (FLUSH) 0.9 %
10 SYRINGE (ML) INJECTION ONCE
Status: COMPLETED | OUTPATIENT
Start: 2019-04-22 | End: 2019-04-22

## 2019-04-22 RX ORDER — LISINOPRIL 10 MG/1
10 TABLET ORAL 2 TIMES DAILY
COMMUNITY

## 2019-04-22 RX ORDER — DEXTROSE MONOHYDRATE 50 MG/ML
100 INJECTION, SOLUTION INTRAVENOUS PRN
Status: DISCONTINUED | OUTPATIENT
Start: 2019-04-22 | End: 2019-04-23 | Stop reason: HOSPADM

## 2019-04-22 RX ORDER — NITROGLYCERIN 0.4 MG/1
0.4 TABLET SUBLINGUAL EVERY 5 MIN PRN
Status: DISCONTINUED | OUTPATIENT
Start: 2019-04-22 | End: 2019-04-23 | Stop reason: HOSPADM

## 2019-04-22 RX ORDER — ASPIRIN 81 MG/1
81 TABLET, CHEWABLE ORAL DAILY
Status: DISCONTINUED | OUTPATIENT
Start: 2019-04-22 | End: 2019-04-23 | Stop reason: HOSPADM

## 2019-04-22 RX ORDER — ASPIRIN 81 MG/1
324 TABLET, CHEWABLE ORAL ONCE
Status: COMPLETED | OUTPATIENT
Start: 2019-04-22 | End: 2019-04-22

## 2019-04-22 RX ORDER — ONDANSETRON 2 MG/ML
4 INJECTION INTRAMUSCULAR; INTRAVENOUS EVERY 6 HOURS PRN
Status: DISCONTINUED | OUTPATIENT
Start: 2019-04-22 | End: 2019-04-22 | Stop reason: ALTCHOICE

## 2019-04-22 RX ORDER — LANOLIN ALCOHOL/MO/W.PET/CERES
500 CREAM (GRAM) TOPICAL DAILY
Status: DISCONTINUED | OUTPATIENT
Start: 2019-04-22 | End: 2019-04-23 | Stop reason: HOSPADM

## 2019-04-22 RX ORDER — CHOLECALCIFEROL (VITAMIN D3) 50 MCG
5000 TABLET ORAL DAILY
Status: DISCONTINUED | OUTPATIENT
Start: 2019-04-22 | End: 2019-04-23 | Stop reason: HOSPADM

## 2019-04-22 RX ORDER — LISINOPRIL 10 MG/1
10 TABLET ORAL 2 TIMES DAILY
Status: DISCONTINUED | OUTPATIENT
Start: 2019-04-22 | End: 2019-04-23 | Stop reason: HOSPADM

## 2019-04-22 RX ORDER — DEXTROSE MONOHYDRATE 25 G/50ML
12.5 INJECTION, SOLUTION INTRAVENOUS PRN
Status: DISCONTINUED | OUTPATIENT
Start: 2019-04-22 | End: 2019-04-23 | Stop reason: HOSPADM

## 2019-04-22 RX ORDER — TAMSULOSIN HYDROCHLORIDE 0.4 MG/1
0.4 CAPSULE ORAL DAILY
Status: DISCONTINUED | OUTPATIENT
Start: 2019-04-22 | End: 2019-04-23 | Stop reason: HOSPADM

## 2019-04-22 RX ORDER — FINASTERIDE 5 MG/1
5 TABLET, FILM COATED ORAL DAILY
Status: DISCONTINUED | OUTPATIENT
Start: 2019-04-22 | End: 2019-04-23 | Stop reason: HOSPADM

## 2019-04-22 RX ORDER — FOLIC ACID 1 MG/1
1 TABLET ORAL DAILY
Status: DISCONTINUED | OUTPATIENT
Start: 2019-04-22 | End: 2019-04-23 | Stop reason: HOSPADM

## 2019-04-22 RX ORDER — CLOPIDOGREL BISULFATE 75 MG/1
75 TABLET ORAL DAILY
Status: DISCONTINUED | OUTPATIENT
Start: 2019-04-22 | End: 2019-04-23 | Stop reason: HOSPADM

## 2019-04-22 RX ADMIN — METOPROLOL TARTRATE 25 MG: 25 TABLET ORAL at 21:19

## 2019-04-22 RX ADMIN — ASPIRIN 81 MG 162 MG: 81 TABLET ORAL at 14:28

## 2019-04-22 RX ADMIN — Medication 10 ML: at 11:46

## 2019-04-22 RX ADMIN — Medication 10 ML: at 21:28

## 2019-04-22 RX ADMIN — NITROGLYCERIN 0.4 MG: 0.4 TABLET, ORALLY DISINTEGRATING SUBLINGUAL at 10:56

## 2019-04-22 RX ADMIN — ENOXAPARIN SODIUM 40 MG: 40 INJECTION SUBCUTANEOUS at 21:21

## 2019-04-22 RX ADMIN — ATORVASTATIN CALCIUM 80 MG: 40 TABLET, FILM COATED ORAL at 21:19

## 2019-04-22 RX ADMIN — IOPAMIDOL 75 ML: 755 INJECTION, SOLUTION INTRAVENOUS at 11:46

## 2019-04-22 RX ADMIN — LISINOPRIL 10 MG: 10 TABLET ORAL at 21:20

## 2019-04-22 ASSESSMENT — PAIN DESCRIPTION - FREQUENCY: FREQUENCY: CONTINUOUS

## 2019-04-22 ASSESSMENT — PAIN SCALES - GENERAL
PAINLEVEL_OUTOF10: 0
PAINLEVEL_OUTOF10: 2
PAINLEVEL_OUTOF10: 0

## 2019-04-22 ASSESSMENT — PAIN DESCRIPTION - LOCATION: LOCATION: CHEST

## 2019-04-22 ASSESSMENT — PAIN DESCRIPTION - ORIENTATION: ORIENTATION: LEFT

## 2019-04-22 ASSESSMENT — PAIN DESCRIPTION - PAIN TYPE: TYPE: ACUTE PAIN

## 2019-04-22 ASSESSMENT — PAIN DESCRIPTION - DESCRIPTORS: DESCRIPTORS: SHARP

## 2019-04-22 NOTE — ED PROVIDER NOTES
Chief complaint: Chest pain    HPI:  4/22/19, Time: 10:29 AM         Rocio Delgado is a 81 y.o. male presenting to the ED for chest pain. The patient states that he began with a left-sided chest pain approximately one hour prior to arrival he is sitting reading the newspaper. The pain is located on the left side of the chest. The pain does radiate to his left shoulder and neck. Nothing makes the pain better or worse. He has not had any pain in past. No treatment for the pain prior to arrival. Pain is been constant since onset. Pain is currently rated 2 out of 10. He did have mild associated lightheadedness and shortness of breath. The patient does have a history of hypertension, hyperlipidemia, diabetes and coronary artery disease. The patient is not a current smoker. The patient does have a history of pulmonary embolus in 2011, denies any active malignancy, recent surgeries. He did have a recent trip from [place]. ROS:   Pertinent positives and negatives are stated within HPI, all other systems reviewed and are negative.  --------------------------------------------- PAST HISTORY ---------------------------------------------  Past Medical History:  has a past medical history of Aortic valve replaced, Cancer ([facility] [address] [facility] Elevated cholesterol, Hypertension, Pacemaker, Prostate enlargement, [facility] S/P CABG x 1, and TIA (transient ischemic attack). Past Surgical History:  has a past surgical history that includes Cardiac surgery (07/06/2015) and pacemaker placement. Social History:  reports that he has quit smoking. He has quit using smokeless tobacco. He reports that he drinks alcohol. He reports that he does not use drugs. Family History: family history is not on file. The patients home medications have been reviewed. Allergies: Patient has no known allergies.     ---------------------------------------------------PHYSICAL 3. Moderate to moderately severe cardiomegaly with mitral annular   calcifications and left main, left anterior descending and left   circumflex coronary artery calcifications. 4. Nonobstructing right renal calculus measuring approximately 0.2 by   centimeters. 5. Left renal cyst.   6. Nonspecific abnormally enlarged left axillary lymphadenopathy,   findings can be seen in infection or inflammatory/reactive   lymphadenopathy with other more concerning etiologies not excluded   based on this study. EKG: This EKG is signed and interpreted by me. Rate: 60  Rhythm: Ventricular paced rhythm  Interpretation: Ventricular paced rhythm, scarbosa criteria negative. Comparison: stable as compared to patient's most recent EKG      ------------------------- NURSING NOTES AND VITALS REVIEWED ---------------------------   The nursing notes within the ED encounter and vital signs as below have been reviewed by myself. /70   Pulse 66   Temp 97 °F (36.1 °C) (Temporal)   Resp 18   Ht 5' 6\" (1.676 m)   Wt 180 lb (81.6 kg)   SpO2 96%   BMI 29.05 kg/m²   Oxygen Saturation Interpretation: Normal    The patients available past medical records and past encounters were reviewed. ------------------------------ ED COURSE/MEDICAL DECISION MAKING----------------------  Medications   sodium chloride flush 0.9 % injection 10 mL (10 mLs Intravenous Given 4/22/19 1146)   iopamidol (ISOVUE-370) 76 % injection 75 mL (75 mLs Intravenous Given 4/22/19 1146)   aspirin chewable tablet 324 mg (162 mg Oral Given 4/22/19 1428)             Medical Decision Making:    Patient is a 69-year-old now presenting to the emergency department with a chief complaint of chest pain. Patient did have labs and imaging was reviewed. Patient with suspicious area on CTA of the chest could be infiltrate versus atelectasis, patient is afebrile with no leukocytosis and no cough. Unlikely pneumonia.  Patient will be admitted for further

## 2019-04-22 NOTE — H&P
atorvastatin (LIPITOR) 80 MG tablet Take 80 mg by mouth nightly    Historical Provider, MD   vitamin B-12 (CYANOCOBALAMIN) 500 MCG tablet Take 500 mcg by mouth daily    Historical Provider, MD   mycophenolate (CELLCEPT) 250 MG capsule Take by mouth 2 times daily 500mg in the morning and 250mg at night    Historical Provider, MD   folic acid (FOLVITE) 373 MCG tablet Take 800 mcg by mouth daily    Historical Provider, MD   clopidogrel (PLAVIX) 75 MG tablet Take 75 mg by mouth daily    Historical Provider, MD   aspirin 81 MG tablet Take 81 mg by mouth daily    Historical Provider, MD   vitamin D-3 (CHOLECALCIFEROL) 5000 UNITS TABS Take 5,000 Units by mouth daily    Historical Provider, MD   finasteride (PROSCAR) 5 MG tablet Take 5 mg by mouth daily    Historical Provider, MD   metFORMIN (GLUCOPHAGE) 500 MG tablet Take 500 mg by mouth 2 times daily (with meals)    Historical Provider, MD   metoprolol (LOPRESSOR) 25 MG tablet Take 25 mg by mouth 2 times daily    Historical Provider, MD   omeprazole (PRILOSEC) 20 MG capsule Take 20 mg by mouth 2 times daily    Historical Provider, MD   tamsulosin (FLOMAX) 0.4 MG capsule Take 0.4 mg by mouth daily    Historical Provider, MD   Multiple Vitamins-Minerals (THERAPEUTIC MULTIVITAMIN-MINERALS) tablet Take 1 tablet by mouth daily    Historical Provider, MD       Allergies:  Patient has no known allergies. Social History:      The patient currently lives home with his wife    TOBACCO:   reports that he has quit smoking. He has quit using smokeless tobacco.  ETOH:   reports that he drinks alcohol. Family History:       Reviewed in detail and negative for DM, CAD, Cancer, CVA. Positive as follows:    History reviewed. No pertinent family history. REVIEW OF SYSTEMS:   Pertinent positives as noted in the HPI. All other systems reviewed and negative.     PHYSICAL EXAM:    BP (!) 145/79   Pulse 60   Temp 97 °F (36.1 °C) (Temporal)   Resp 18   Ht 5' 6\" (1.676 m)   Wt 180 lb (81.6 kg)   SpO2 98%   BMI 29.05 kg/m²     General appearance:  No apparent distress, appears stated age and cooperative. HEENT:  Normal cephalic, atraumatic without obvious deformity. Pupils equal, round, and reactive to light. Extra ocular muscles intact. Conjunctivae/corneas clear. Neck: Supple, with full range of motion. No jugular venous distention. Trachea midline. Respiratory:  Normal respiratory effort. Clear to auscultation, bilaterally without Rales/Wheezes/Rhonchi. Cardiovascular:  Regular rate and rhythm with normal S1/S2 without murmurs, rubs or gallops. Abdomen: Soft, non-tender, non-distended with normal bowel sounds. Musculoskeletal:  No clubbing, cyanosis or edema bilaterally. Full range of motion without deformity. Skin: Skin color, texture, turgor normal.  No rashes or lesions. Neurologic:  Neurovascularly intact without any focal sensory/motor deficits. Psychiatric:  Alert and oriented, thought content appropriate, normal insight  Capillary Refill: Brisk,< 3 seconds   Peripheral Pulses: +2 palpable, equal bilaterally     Cta Chest W Contrast  Result Date: 4/22/2019  1. No CT evidence of pulmonary embolism to the segmental level. More distal pulmonary emboli are not excluded. 2. Persistent left basilar airspace disease could be reflective of a persistent or recurrent infiltrate/pneumonia and/or atelectasis/scarring. 3. Moderate to moderately severe cardiomegaly with mitral annular calcifications and left main, left anterior descending and left circumflex coronary artery calcifications. 4. Nonobstructing right renal calculus measuring approximately 0.2 by centimeters. 5. Left renal cyst. 6. Nonspecific abnormally enlarged left axillary lymphadenopathy, findings can be seen in infection or inflammatory/reactive lymphadenopathy with other more concerning etiologies not excluded based on this study.     EKG:  Ventricular pacing    Labs:     Recent Labs     04/22/19  1045   WBC 5.1   HGB 13.3   HCT 41.2        Recent Labs     04/22/19  1045 04/22/19  1056     --    K 4.0  --      --    CO2 25  --    BUN 13  --    CREATININE 1.1 1.1   CALCIUM 9.0  --      Recent Labs     04/22/19  1045   AST 19   ALT 14   BILITOT 0.7   ALKPHOS 52     No results for input(s): INR in the last 72 hours. Recent Labs     04/22/19  1045   TROPONINI <0.01       Urinalysis:    No results found for: [facility], WBCUA, BACTERIA, RBCUA, BLOODU, SPECGRAV, GLUCOSEU      ASSESSMENT:    Active Hospital Problems    Diagnosis Date Noted    Chest pain [R07.9] 10/20/2018     Chest pain  - Patient complains of chest pain to left side and radiating to back and did have tingling in his left arm. This was increased with activity, relieved partially by rest.  He has history of CAD and is on chronic dual anti platelet therapy. - Negative stress test October 2018; last cardiac catheterization 2016 at [facility] prior to pacemaker implant    Hypertension  - Not appear controlled since admission    Diabetes, type II    Status post aortic valve replacement    Hxt of CABG    Pacemaker in situ    Hyperlipidemia    History of pulmonary embolism  - 2011, was on Coumadin for 6 months that time no further issues since 2011    PLAN:    Minute to telemetry  Consult cardiology  Cycle troponins  Continue home medications   Sliding scale insulin    DVT Prophylaxis: lovenox   Diet: No diet orders on file  Code Status: No Order    PT/OT Eval Status: na     Dispo - admit        Ella Roberts, APRN - CNP    Thank you Kyra Wells MD for the opportunity to be involved in this patient's care. If you have any questions or concerns please feel free to contact me at [phone]     [facility] [place] [facility] Attending Note    Antonio Phipps is a 81 y.o.   male who was seen and examined independently by myself and, after review,  I agree with the history, physical, and assessment documented by the mid-level above.       Physical

## 2019-04-22 NOTE — CONSULTS
 Not on file       Allergies:  No Known Allergies    Home Medications:  Prior to Admission medications    Medication Sig Start Date End Date Taking?  Authorizing Provider   atorvastatin (LIPITOR) 80 MG tablet Take 80 mg by mouth nightly    Historical Provider, MD   vitamin B-12 (CYANOCOBALAMIN) 500 MCG tablet Take 500 mcg by mouth daily    Historical Provider, MD   mycophenolate (CELLCEPT) 250 MG capsule Take by mouth 2 times daily 500mg in the morning and 250mg at night    Historical Provider, MD   folic acid (FOLVITE) 087 MCG tablet Take 800 mcg by mouth daily    Historical Provider, MD   clopidogrel (PLAVIX) 75 MG tablet Take 75 mg by mouth daily    Historical Provider, MD   aspirin 81 MG tablet Take 81 mg by mouth daily    Historical Provider, MD   vitamin D-3 (CHOLECALCIFEROL) 5000 UNITS TABS Take 5,000 Units by mouth daily    Historical Provider, MD   finasteride (PROSCAR) 5 MG tablet Take 5 mg by mouth daily    Historical Provider, MD   metFORMIN (GLUCOPHAGE) 500 MG tablet Take 500 mg by mouth 2 times daily (with meals)    Historical Provider, MD   metoprolol (LOPRESSOR) 25 MG tablet Take 25 mg by mouth 2 times daily    Historical Provider, MD   omeprazole (PRILOSEC) 20 MG capsule Take 20 mg by mouth 2 times daily    Historical Provider, MD   tamsulosin (FLOMAX) 0.4 MG capsule Take 0.4 mg by mouth daily    Historical Provider, MD   Multiple Vitamins-Minerals (THERAPEUTIC MULTIVITAMIN-MINERALS) tablet Take 1 tablet by mouth daily    Historical Provider, MD       Current Medications:  Current Facility-Administered Medications   Medication Dose Route Frequency Provider Last Rate Last Dose    nitroGLYCERIN (NITROSTAT) SL tablet 0.4 mg  0.4 mg Sublingual Q5 Min PRN Denise Kirk MD   0.4 mg at 04/22/19 1056    aspirin chewable tablet 81 mg  81 mg Oral Daily Denise Kirk MD        atorvastatin (LIPITOR) tablet 80 mg  80 mg Oral Nightly Denise Kirk MD        clopidogrel (PLAVIX) tablet 75 mg  75 mg Oral Daily Mari Dumas MD        finasteride (PROSCAR) tablet 5 mg  5 mg Oral Daily Mari Dumas MD        metoprolol tartrate (LOPRESSOR) tablet 25 mg  25 mg Oral BID Mari Dumas MD        therapeutic multivitamin-minerals 1 tablet  1 tablet Oral Daily Mari Dumas MD        [START ON 4/23/2019] pantoprazole (PROTONIX) tablet 40 mg  40 mg Oral QAM AC Mari Dumas MD        tamsulosin [facility]) capsule 0.4 mg  0.4 mg Oral Daily Mari Dumas MD        vitamin B-12 (CYANOCOBALAMIN) tablet 500 mcg  500 mcg Oral Daily Mari Dumas MD        vitamin D (CHOLECALCIFEROL) tablet 5,000 Units  5,000 Units Oral Daily Mari Dumas MD        sodium chloride flush 0.9 % injection 10 mL  10 mL Intravenous 2 times per day Mari Dumas MD        sodium chloride flush 0.9 % injection 10 mL  10 mL Intravenous PRN Mari Dumas MD        magnesium hydroxide (MILK OF MAGNESIA) 400 MG/5ML suspension 30 mL  30 mL Oral Daily PRN Mari Dumas MD        enoxaparin (LOVENOX) injection 40 mg  40 mg Subcutaneous Daily Mari Dumas MD        folic acid (FOLVITE) tablet 1 mg  1 mg Oral Daily Mari Dumas MD        insulin lispro (HUMALOG) injection vial 0-6 Units  0-6 Units Subcutaneous TID WC Davikenn Carey, APRN - CNP        insulin lispro (HUMALOG) injection vial 0-3 Units  0-3 Units Subcutaneous Nightly Davi Carey, APRN - CNP        glucose (GLUTOSE) 40 % oral gel 15 g  15 g Oral PRN Davi Aspkat, APRN - CNP        dextrose 50 % solution 12.5 g  12.5 g Intravenous PRN Davi Aspkat, APRN - CNP        glucagon (rDNA) injection 1 mg  1 mg Intramuscular PRN Davi Aspkat, APRN - CNP        dextrose 5 % solution  100 mL/hr Intravenous PRN Davi Aspen, APRN - CNP        trimethobenzamide Dwyane Mend) injection 200 mg  200 mg Intramuscular Q6H PRN Mari Dumas MD          dextrose           Physical Exam:  BP (!) 179/84   Pulse 60   Temp 96.8 °F (36 °C) (Temporal)   Resp 20   Ht 5' 6\" (1.676 m)   Wt 180 lb (81.6 kg)   SpO2 99%   BMI 29.05 kg/m²   Weight change: Wt Readings from Last 3 Encounters:   04/22/19 180 lb (81.6 kg)   01/04/19 180 lb (81.6 kg)   10/20/18 176 lb (79.8 kg)     The patient is awake, alert and in no discomfort or distress. No gross musculoskeletal deformity or lymphadenopathy are present. No significant skin or nail changes are present. Gross examination of head, eyes, nose and throat are negative. Jugular venous pressure is normal and no carotid bruits are present. No thyromegaly is noted. Normal respiratory effort is noted with no accessory muscle usage present. Lung fields are clear to ascultation. Cardiac examination is notable for a regular rate and rhythm with no palpable thrill. No gallop rhythm is soft early peaking systolic ejection murmur at the left sternal border are identified. A benign abdominal examination is present with no masses or organomegaly. A normal abdominal aortic pulsation is present. Intact pulses are present throughout all extremities and no peripheral edema is present. No focal neurologic deficits are present. Intake/Output:  No intake or output data in the 24 hours ending 04/22/19 1549  No intake/output data recorded. Laboratory Tests:  Lab Results   Component Value Date    CREATININE 1.1 04/22/2019    BUN 13 04/22/2019     04/22/2019    K 4.0 04/22/2019     04/22/2019    CO2 25 04/22/2019     No results for input(s): CKTOTAL, CKMB in the last 72 hours.     Invalid input(s): TROPONONI  No results found for: BNP  Lab Results   Component Value Date    WBC 5.1 04/22/2019    RBC 4.41 04/22/2019    HGB 13.3 04/22/2019    HCT 41.2 04/22/2019    MCV 93.4 04/22/2019    MCH 30.2 04/22/2019    MCHC 32.3 04/22/2019    RDW 13.5 04/22/2019     04/22/2019    MPV 9.9 04/22/2019     Recent Labs     04/22/19  1045   ALKPHOS 52   ALT 14   AST 19   PROT 6.6   BILITOT 0.7   LABALBU 4.1     No results found for: MG  Lab Results   Component Value Date    PROTIME 12.0 10/20/2018    INR 1.1 10/20/2018     No results found for: TSH  No components found for: CHLPL  No results found for: TRIG  No results found for: HDL  No results found for: [facility]      Cardiac Tests:  ECG: A resting electrocardiogram demonstrates evidence sinus rhythm with appropriate dual-chamber pacing  Telemetry findings reviewed:  sinus rhythm, no new tachy/bradyarrhythmias   Chest X-ray: A chest x-ray obtained a CT scan film for a contrast CT angiogram demonstrates no evidence of cardiomegaly with minimal interstitial changes and probable volume loss of the left lower lobe  Last Echocardiogram: An echocardiogram of August, 2018 [facility] demonstrated evidence of a normal size left ventricular chamber with moderate concentric left ventricular hypertrophy and normal left ventricular systolic function with mild right ventricular dilatation and normal right ventricular function and severe left atrial enlargement is present with a normal functioning bioprosthetic aortic valve replacement and mean transaortic gradient of 16 mmHg and severe mitral annular calcification with mild functional mitral stenosis and mild mitral regurgitation  Last stress test:  A gated vasodilator myocardial perfusion imaging study of October, 2018 demonstrated no evidence of perfusion abnormalities with normal left ventricular systolic function      ASSESSMENT / PLAN: On a clinical basis, the patient presents with atypically described chest discomfort not clinically suggestive of a cardiovascular origin in conjunction with left scapular discomfort. Presently, no objective evidence of an acute coronary syndrome is noted with present plans of observation and serial cardiac biomarkers. Presently no clinical evidence of decompensated left ventricle systolic dysfunction or volume overload nor arrhythmia related manifestations of been noted.  His existing medical regimen will be maintained with no cardiovascular assessment anticipated unless on the basis of additional objective abnormalities developing overnight. Continued aggressive medical management and risk factor modification of blood pressure, diabetes and serum lipids will be necessary to reduce risk of future atherosclerotic development. Appropriate antibiotic prophylaxis will remain essential to reducing risk of bacterial endocarditis. At the time of evaluation, his present condition and recommendations been discussed with he and family. Thank you for allowing me to participate in your patient's care. Please feel free to contact me if you have any questions or concerns. Note: This report was completed using computerized voice recognition software. Every effort has been made to ensure accuracy, however; inadvertent computerized transcription errors may be present. Dustin Baumann.  Ashley Amaro, [address] [facility]

## 2019-04-23 VITALS
RESPIRATION RATE: 18 BRPM | BODY MASS INDEX: 28.93 KG/M2 | HEIGHT: 66 IN | DIASTOLIC BLOOD PRESSURE: 70 MMHG | OXYGEN SATURATION: 96 % | WEIGHT: 180 LBS | TEMPERATURE: 97 F | SYSTOLIC BLOOD PRESSURE: 130 MMHG | HEART RATE: 66 BPM

## 2019-04-23 LAB
METER GLUCOSE: 99 MG/DL (ref 74–99)
TROPONIN: <0.01 NG/ML (ref 0–0.03)

## 2019-04-23 PROCEDURE — 96372 THER/PROPH/DIAG INJ SC/IM: CPT

## 2019-04-23 PROCEDURE — 6360000002 HC RX W HCPCS: Performed by: STUDENT IN AN ORGANIZED HEALTH CARE EDUCATION/TRAINING PROGRAM

## 2019-04-23 PROCEDURE — 2580000003 HC RX 258: Performed by: STUDENT IN AN ORGANIZED HEALTH CARE EDUCATION/TRAINING PROGRAM

## 2019-04-23 PROCEDURE — 6370000000 HC RX 637 (ALT 250 FOR IP): Performed by: HOSPITALIST

## 2019-04-23 PROCEDURE — 82962 GLUCOSE BLOOD TEST: CPT

## 2019-04-23 PROCEDURE — 99225 PR SBSQ OBSERVATION CARE/DAY 25 MINUTES: CPT | Performed by: INTERNAL MEDICINE

## 2019-04-23 PROCEDURE — 6370000000 HC RX 637 (ALT 250 FOR IP): Performed by: STUDENT IN AN ORGANIZED HEALTH CARE EDUCATION/TRAINING PROGRAM

## 2019-04-23 PROCEDURE — 2700000000 HC OXYGEN THERAPY PER DAY

## 2019-04-23 PROCEDURE — 36415 COLL VENOUS BLD VENIPUNCTURE: CPT

## 2019-04-23 PROCEDURE — G0378 HOSPITAL OBSERVATION PER HR: HCPCS

## 2019-04-23 PROCEDURE — 84484 ASSAY OF TROPONIN QUANT: CPT

## 2019-04-23 RX ORDER — NITROGLYCERIN 0.4 MG/1
TABLET SUBLINGUAL
Qty: 25 TABLET | Refills: 0 | Status: SHIPPED | OUTPATIENT
Start: 2019-04-23

## 2019-04-23 RX ADMIN — TAMSULOSIN HYDROCHLORIDE 0.4 MG: 0.4 CAPSULE ORAL at 09:37

## 2019-04-23 RX ADMIN — METOPROLOL TARTRATE 25 MG: 25 TABLET ORAL at 09:36

## 2019-04-23 RX ADMIN — LISINOPRIL 10 MG: 10 TABLET ORAL at 09:36

## 2019-04-23 RX ADMIN — FOLIC ACID 1 MG: 1 TABLET ORAL at 09:36

## 2019-04-23 RX ADMIN — CYANOCOBALAMIN TAB 1000 MCG 500 MCG: 1000 TAB at 09:38

## 2019-04-23 RX ADMIN — ENOXAPARIN SODIUM 40 MG: 40 INJECTION SUBCUTANEOUS at 09:35

## 2019-04-23 RX ADMIN — Medication 10 ML: at 09:41

## 2019-04-23 RX ADMIN — FINASTERIDE 5 MG: 5 TABLET, FILM COATED ORAL at 09:36

## 2019-04-23 RX ADMIN — PANTOPRAZOLE SODIUM 40 MG: 40 TABLET, DELAYED RELEASE ORAL at 06:20

## 2019-04-23 RX ADMIN — Medication 5000 UNITS: at 09:38

## 2019-04-23 RX ADMIN — CLOPIDOGREL 75 MG: 75 TABLET, FILM COATED ORAL at 09:35

## 2019-04-23 RX ADMIN — ASPIRIN 81 MG 81 MG: 81 TABLET ORAL at 09:35

## 2019-04-23 ASSESSMENT — PAIN SCALES - GENERAL
PAINLEVEL_OUTOF10: 0
PAINLEVEL_OUTOF10: 0

## 2019-04-23 NOTE — PROGRESS NOTES
INPATIENT CARDIOLOGY FOLLOW-UP    Name: Douglas Navarro    Age: 81 y.o. Date of Admission: 4/22/2019 10:16 AM    Date of Service: 4/23/2019    Chief Complaint: Follow-up for atypical chest pain, coronary atherosclerosis, aortic valve disease, complete heart block, hypertension    Interim History: The patient presently remains stable from a cardiovascular standpoint and along with his wife deny any further symptoms similar to that of his presentation. His wife in the face of mild cognitive dysfunction and absence of personal recollection of this event reports an episode of mild discomfort in the area of his left hemidiaphragm resolved following the administration of ginger ale and belching. No identified cardiac arrhythmias have been noted during hospitalization and cardiac biomarkers demonstrate no evidence of myocardial injury. Review of Systems: The remainder of a complete multisystem review including consitutional, central nervous, respiratory, circulatory, gastrointestinal, genitourinary, endocrinologic, hematologic, musculoskeletal and psychiatric are negative.     Problem List:  Patient Active Problem List   Diagnosis    Mobitz II    Bradycardia    Chest pain    Coronary artery disease involving native coronary artery of native heart without angina pectoris    Complete heart block ([address]    Aortic valve disease    Type 2 diabetes mellitus without complication, without long-term current use of insulin ([address]    Pure hypercholesterolemia    Essential hypertension    Near syncope       Allergies:  No Known Allergies    Current Medications:  Current Facility-Administered Medications   Medication Dose Route Frequency Provider Last Rate Last Dose    nitroGLYCERIN (NITROSTAT) SL tablet 0.4 mg  0.4 mg Sublingual Q5 Min PRN Concha Callejas MD   0.4 mg at 04/22/19 1056    aspirin chewable tablet 81 mg  81 mg Oral Daily Concha Callejas MD        atorvastatin (LIPITOR) tablet 80 mg  80 mg Oral Nightly tablet 10 mg  10 mg Oral BID Danny Blunt MD   10 mg at 04/22/19 2120      dextrose         Physical Exam:  BP (!) 141/66   Pulse 60   Temp 97.4 °F (36.3 °C) (Temporal)   Resp 16   Ht 5' 6\" (1.676 m)   Wt 180 lb (81.6 kg)   SpO2 98%   BMI 29.05 kg/m²   Weight change: Wt Readings from Last 3 Encounters:   04/22/19 180 lb (81.6 kg)   01/04/19 180 lb (81.6 kg)   10/20/18 176 lb (79.8 kg)     The patient is awake, alert and in no discomfort or distress. No gross musculoskeletal deformity is present. No significant skin or nail changes are present. Gross examination of head, eyes, nose and throat are negative. Jugular venous pressure is normal and no carotid bruits are present. Normal respiratory effort is noted with no accessory muscle usage present. Lung fields are clear to ascultation. Cardiac examination is notable for a regular rate and rhythm with no palpable thrill. No gallop rhythm and a soft systolic murmur at the left sternal border are identified. A benign abdominal examination is present with no masses or organomegaly. Intact pulses are present throughout all extremities and no peripheral edema is present. No focal neurologic deficits are present. Intake/Output:  No intake or output data in the 24 hours ending 04/23/19 0631  No intake/output data recorded. Laboratory Tests:  Lab Results   Component Value Date    CREATININE 1.1 04/22/2019    BUN 13 04/22/2019     04/22/2019    K 4.0 04/22/2019     04/22/2019    CO2 25 04/22/2019     No results for input(s): CKTOTAL, CKMB in the last 72 hours.     Invalid input(s): TROPONONI  No results found for: BNP  Lab Results   Component Value Date    WBC 5.1 04/22/2019    RBC 4.41 04/22/2019    HGB 13.3 04/22/2019    HCT 41.2 04/22/2019    MCV 93.4 04/22/2019    MCH 30.2 04/22/2019    MCHC 32.3 04/22/2019    RDW 13.5 04/22/2019     04/22/2019    MPV 9.9 04/22/2019     Recent Labs     04/22/19  1045   ALKPHOS 52   ALT 14   AST 19   PROT 6. 6   BILITOT 0.7   LABALBU 4.1     No results found for: MG  Lab Results   Component Value Date    PROTIME 12.0 10/20/2018    INR 1.1 10/20/2018     No results found for: TSH  No components found for: CHLPL  No results found for: TRIG  No results found for: HDL  No results found for: [address]    Cardiac Tests:  Telemetry findings reviewed: sinus rhythm with appropriate dual-chamber pacing, no new tachy/bradyarrhythmias overnight      ASSESSMENT / PLAN:  On a clinical basis, the patient remains stable from a cardiovascular standpoint with no additional significant symptoms similar to that of presentation according to his wife in view of a component of cognitive dysfunction, reports of mild indigestion-like discomfort beneath his left hemidiaphragm. Additional cardiac biomarkers demonstrate no evidence of myocardial injury. On this basis and is related to initial consultation in view of the atypical nature of his presentation with no objective evidence of active cardiovascular issues, no additional cardiovascular assessment appears presently indicated with recommended continuation of existing medical therapy. Continued appropriate risk factor modification of blood pressure, diabetes and serum lipids will be necessary to reduce risk of future atherosclerotic development along with that of appropriate antibiotic prophylaxis at time of dental interventions to reduce risk of bacterial endocarditis. We will further evaluate him should additional cardiovascular difficulties or concerns arise with his wife's stated intention of their continued follow-up with the cardiologist and electrophysiologist at [facility]. I have discussed that should additional cardiovascular issues occur, that either one of the [facility] cardiologist be contacted or that they contacted their cardiologist in [facility] to obtain an earlier appointment than that presently scheduled.       Note: This report was completed utilizing computer voice recognition software. Every effort has been made to ensure accuracy, however; inadvertent computerized transcription errors may be present. Floyd Oconnell.  Debo Fields, [address] [facility]

## 2019-04-23 NOTE — PROGRESS NOTES
ADVANCED CARE PLANNING    Patient Name: Sena Cam       YOB: 1935              MRN:    [id number]  Admission Date:  4/22/2019 10:16 AM    Active Diagnoses: Active Problems:    Chest pain  Resolved Problems:    * No resolved hospital problems. *      These active diagnoses are of sufficient risk that focused discussion on advanced care planning is indicated in order to allow the patient to thoughtfully consider personal goals of care; and, if situations arise that prevent the patient to personally give input, to ensure appropriate representation of their personal desire for different levels and levels of care. Persons present in discussion: María Elena Siddiqui MD, . Discussion: I reviewed his admission for ACS rule out and his desires for ongoing aggressive care, including potential intubation and mechanical ventilation. I presented and explained the availability of our palliative care team to him, including the availability of advanced directives forms which he can review with his family and then fill out if they so wish. He would like to be FULL code. Time Spent on Advanced Planning Documents: 30 minutes    Electronically signed by Pooja Paez MD on 4/23/2019 at 3:21 PM    NOTE: This report was transcribed using voice recognition software. Every effort was made to ensure accuracy; however, inadvertent computerized transcription errors may be present.

## 2019-04-23 NOTE — DISCHARGE SUMMARY
Hospital Medicine Discharge Summary    Patient ID: Elias Montanez      Patient's PCP: Arabella Mtz MD    Admit Date: 4/22/2019     Discharge Date:   04/22/19    Admitting Physician: Candy Olivo MD     Discharge Physician: Kaleb Reis MD     Discharge Diagnoses: Active Hospital Problems    Diagnosis Date Noted    Chest pain [R07.9] 10/20/2018       The patient was seen and examined on day of discharge and this discharge summary is in conjunction with any daily progress note from day of discharge. Admission HPI: This is a  81 y.o. male who presented to [address] [place] [address] with chest pain. Started  At 930 this am in his back on left side and then went to front, achey and pulsating. He was walking at the time. Felt slightly better with sitting down.  + SOB with it, + lightheaded, + nausea slightly. Noted left arm tingling at the same time. He sat down and called for his wife. EMS was called. He was brought into the emergency room. Follows with [facility] for cardiology. Had stress in October 2018 which was negative. Cath 11/2017 at [facility] prior to pacemaker. ED course: Once the vagina rate 84, heart rate 60, afebrile 90% on room air. Lab work showed unremarkable labs with negative troponin. CTA of chest showed no evidence of pulmonary embolism to the segmental level but more distal pulmonary embolus are not excluded; persistent left airspace disease. Patient was treated with 324 mg a baby aspirin. Admitted for further evaluation and treatment. On assessment, patient is lying in bed in no acute distress. He denies any shortness of breath at this time, denies any chest pain at this time but does have a slight ache in the upper left side of his back. His pain does not increase with touch or position.         Hospital Course:   Mr. Elias Montanez, a 81y.o. 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 During the course the patient's hospital stay admitted for chest pain, rule out ACS. Tropnin were negative. Cardio was consulted and to continue medical management and to follow up with cardiologist at [facility]. Clinically stable with no complaints at time of discharge to home. Consults:     IP CONSULT TO INTERNAL MEDICINE  IP CONSULT TO CARDIOLOGY    Significant Diagnostic Studies:  As above      Discharge Instructions/Follow-up:  As above       Activity: activity as tolerated    Physical Exam:  Vitals:    04/23/19 0530   BP: (!) 141/66   Pulse: 60   Resp: 16   Temp: 97.4 °F (36.3 °C)   SpO2: 98%       General appearance:  No apparent distress, appears stated age and cooperative. HEENT:  Normal cephalic, atraumatic without obvious deformity. Pupils equal, round, and reactive to light. Extra ocular muscles intact. Conjunctivae/corneas clear. Neck: Supple, with full range of motion. No jugular venous distention. Trachea midline. Respiratory:  Normal respiratory effort. Clear to auscultation, bilaterally without Rales/Wheezes/Rhonchi. Cardiovascular:  Regular rate and rhythm with normal S1/S2 without murmurs, rubs or gallops. Abdomen: Soft, non-tender, non-distended with normal bowel sounds. Musculoskeletal:  No clubbing, cyanosis or edema bilaterally. Full range of motion without deformity. Skin: Skin color, texture, turgor normal.  No rashes or lesions. Neurologic:  Neurovascularly intact without any focal sensory/motor deficits. Psychiatric:  Alert and oriented, thought content appropriate, normal insight  Capillary Refill: Brisk,< 3 seconds   Peripheral Pulses: +2 palpable, equal bilaterally         Labs:  For convenience and continuity at follow-up the following most recent labs are provided:      CBC:    Lab Results   Component Value Date    WBC 5.1 04/22/2019    HGB 13.3 04/22/2019    HCT 41.2 04/22/2019     04/22/2019       Renal:    Lab Results   Component Value Date     04/22/2019    K 4.0 04/22/2019     04/22/2019    CO2 25 04/22/2019    BUN 13 04/22/2019    CREATININE 1.1 04/22/2019    CREATININE 1.1 04/22/2019    CALCIUM 9.0 04/22/2019       Imaging:  CTA CHEST W CONTRAST   Final Result   1. No CT evidence of pulmonary embolism to the segmental level. More   distal pulmonary emboli are not excluded. 2. Persistent left basilar airspace disease could be reflective of a   persistent or recurrent infiltrate/pneumonia and/or   atelectasis/scarring. 3. Moderate to moderately severe cardiomegaly with mitral annular   calcifications and left main, left anterior descending and left   circumflex coronary artery calcifications. 4. Nonobstructing right renal calculus measuring approximately 0.2 by   centimeters. 5. Left renal cyst.   6. Nonspecific abnormally enlarged left axillary lymphadenopathy,   findings can be seen in infection or inflammatory/reactive   lymphadenopathy with other more concerning etiologies not excluded   based on this study. Discharge Medications:     Current Discharge Medication List           Details   nitroGLYCERIN (NITROSTAT) 0.4 MG SL tablet up to max of 3 total doses. If no relief after 1 dose, call 911.   Qty: 25 tablet, Refills: 0              Details   lisinopril (PRINIVIL;ZESTRIL) 10 MG tablet Take 10 mg by mouth 2 times daily      magnesium oxide (MAG-OX) 400 MG tablet Take 400 mg by mouth daily      atorvastatin (LIPITOR) 80 MG tablet Take 80 mg by mouth nightly      vitamin B-12 (CYANOCOBALAMIN) 500 MCG tablet Take 500 mcg by mouth daily      mycophenolate (CELLCEPT) 250 MG capsule Take by mouth 2 times daily 500mg in the morning and 250mg at night      folic acid (FOLVITE) 657 MCG tablet Take 800 mcg by mouth daily      clopidogrel (PLAVIX) 75 MG tablet Take 75 mg by mouth daily      aspirin 81 MG tablet Take 81 mg by mouth daily      vitamin D-3 (CHOLECALCIFEROL) 5000 UNITS TABS Take 5,000 Units by mouth daily      finasteride (PROSCAR) 5 MG tablet Take 5 mg by mouth daily      metFORMIN (GLUCOPHAGE) 500 MG tablet Take 500 mg by mouth 2 times daily (with meals)      metoprolol (LOPRESSOR) 25 MG tablet Take 25 mg by mouth 2 times daily      omeprazole (PRILOSEC) 20 MG capsule Take 20 mg by mouth 2 times daily      tamsulosin (FLOMAX) 0.4 MG capsule Take 0.4 mg by mouth daily             Time Spent on discharge is more than 31 min in the examination, evaluation, counseling and review of medications and discharge plan. Xavi Lewis MD   4/23/2019      Thank you Cora Benítez MD for the opportunity to be involved in this patient's care. If you have any questions or concerns please feel free to contact me. NOTE: This report was transcribed using voice recognition software. Every effort was made to ensure accuracy; however, inadvertent computerized transcription errors may be present.

## 2022-05-12 ENCOUNTER — OFFICE VISIT (OUTPATIENT)
Dept: FAMILY MEDICINE CLINIC | Age: 87
End: 2022-05-12
Payer: MEDICARE

## 2022-05-12 VITALS
HEART RATE: 70 BPM | TEMPERATURE: 98.1 F | OXYGEN SATURATION: 90 % | HEIGHT: 66 IN | DIASTOLIC BLOOD PRESSURE: 78 MMHG | SYSTOLIC BLOOD PRESSURE: 136 MMHG | BODY MASS INDEX: 28.93 KG/M2 | WEIGHT: 180 LBS | RESPIRATION RATE: 19 BRPM

## 2022-05-12 DIAGNOSIS — R04.0 EPISTAXIS: Primary | ICD-10-CM

## 2022-05-12 PROCEDURE — 99213 OFFICE O/P EST LOW 20 MIN: CPT

## 2022-05-12 RX ORDER — AMOXICILLIN AND CLAVULANATE POTASSIUM 875; 125 MG/1; MG/1
1 TABLET, FILM COATED ORAL 2 TIMES DAILY
Qty: 20 TABLET | Refills: 0 | Status: SHIPPED | OUTPATIENT
Start: 2022-05-12 | End: 2022-05-22

## 2022-05-12 NOTE — PROGRESS NOTES
Chief Complaint       Epistaxis (off and on x 2 days )    History of Present Illness   Source of history provided by:  patient. Hamlet Woo is a 81 y.o. male who presenting to the walk in clinic for evaluation of intermittent nose bleed which has been present for the past 2 days. Bleeding has only been from right nostril. Patient has been able to control the bleeding at home with pressure and cotton balls. He states that a few hours prior to arrival he had another episode. He reports the blood was \"more clotted\" this episode. Patient is on Plavix. Patient is not actively bleeding at this time. The complaint has been improving in severity. No headache, visual changes, fever, chills, neck pain, or lethargy. ROS    Unless otherwise stated in this report or unable to obtain because of the patient's clinical or mental status as evidenced by the medical record, this patients's positive and negative responses for Review of Systems, constitutional, psych, eyes, ENT, cardiovascular, respiratory, gastrointestinal, neurological, genitourinary, musculoskeletal, integument systems and systems related to the presenting problem are either stated in the preceding or were not pertinent or were negative for the symptoms and/or complaints related to the medical problem. Physical Exam         VS:  /78   Pulse 70   Temp 98.1 °F (36.7 °C) (Temporal)   Resp 19   Ht 5' 6\" (1.676 m)   Wt 180 lb (81.6 kg)   SpO2 90%   BMI 29.05 kg/m²    Oxygen Saturation Interpretation: Normal.  Constitutional:  Alert, development consistent with age. Eyes:  PERRL, EOMI, no discharge or conjunctival injection. Ears:  External ears without lesions. TM's clear without erythema or perforation bilaterally. Nose: No evidence of septal hematoma or perforation. Mild erythema to right middle turbinae, no active bleeding. Small clot of blood at base of right nare.   Throat:  Pharynx without injection, exudate, or tonsillar hypertrophy. Airway patient. No blood present. Neck:  Normal ROM. Supple. Lungs:  Clear to auscultation and breath sounds equal.  Heart:  Regular rate and rhythm, normal heart sounds, without pathological murmurs, ectopy, gallops, or rubs. Abdomen:  Soft, nontender, good bowel sounds. No firm or pulsatile mass. Back:  No costovertebral tenderness. Skin:  Normal turgor. Warm, dry, without visible rash, unless noted elsewhere. Neurological:  Alert and oriented. Motor functions intact. Lab / Imaging Results   (All laboratory and radiology results have been personally reviewed by myself)  Labs:  No results found for this visit on 05/12/22. Imaging: All Radiology results interpreted by Radiologist unless otherwise noted. Assessment / Plan     Impression(s):  Sarah Guidry was seen today for epistaxis. Diagnoses and all orders for this visit:    Epistaxis  -     amoxicillin-clavulanate (AUGMENTIN) 875-125 MG per tablet; Take 1 tablet by mouth 2 times daily for 10 days      Disposition:  Disposition: Discharge to home. Packing was placed with single cotton ball into right nostril. Packing was soaked in 1% lidocaine with epinephrine. Clamp was placed over nasal bridge. Patient sat with head back for 15 minutes, bleeding well controlled. Patient walked hallway multiple times, bleeding controlled. Script written for Augmentin to cover infection due to at home packing placed intermittently for 2 days. Pt advised to f/u with PCP in 5-7 days for continued management and further care. ER immediately if symptoms worsen or change. Red flag symptoms discussed. Patient states understanding is in agreement with this care plan. All questions answered. PATEL Trinh    **This report was transcribed using voice recognition software. Every effort was made to ensure accuracy; however, inadvertent computerized transcription errors may be present.

## 2022-11-21 ENCOUNTER — APPOINTMENT (OUTPATIENT)
Dept: CT IMAGING | Age: 87
DRG: 988 | End: 2022-11-21
Payer: MEDICARE

## 2022-11-21 ENCOUNTER — HOSPITAL ENCOUNTER (INPATIENT)
Age: 87
LOS: 7 days | Discharge: HOME | DRG: 988 | End: 2022-11-28
Attending: EMERGENCY MEDICINE | Admitting: INTERNAL MEDICINE
Payer: MEDICARE

## 2022-11-21 DIAGNOSIS — N40.0 BPH WITH ELEVATED PSA: ICD-10-CM

## 2022-11-21 DIAGNOSIS — R31.0 GROSS HEMATURIA: ICD-10-CM

## 2022-11-21 DIAGNOSIS — R97.20 BPH WITH ELEVATED PSA: ICD-10-CM

## 2022-11-21 DIAGNOSIS — I82.90 CLOT: ICD-10-CM

## 2022-11-21 DIAGNOSIS — N30.01 ACUTE CYSTITIS WITH HEMATURIA: Primary | ICD-10-CM

## 2022-11-21 PROBLEM — E87.20 ACIDOSIS: Status: ACTIVE | Noted: 2022-11-21

## 2022-11-21 PROBLEM — L12.0 BULLOUS PEMPHIGOID: Status: ACTIVE | Noted: 2022-11-21

## 2022-11-21 PROBLEM — N39.0 URINARY TRACT INFECTION WITH HEMATURIA: Status: ACTIVE | Noted: 2022-11-21

## 2022-11-21 PROBLEM — D62 ANEMIA ASSOCIATED WITH ACUTE BLOOD LOSS: Status: ACTIVE | Noted: 2022-11-21

## 2022-11-21 PROBLEM — R31.9 URINARY TRACT INFECTION WITH HEMATURIA: Status: ACTIVE | Noted: 2022-11-21

## 2022-11-21 LAB
ALBUMIN SERPL-MCNC: 3.8 G/DL (ref 3.5–5.2)
ALP BLD-CCNC: 94 U/L (ref 40–129)
ALT SERPL-CCNC: 21 U/L (ref 0–40)
ANION GAP SERPL CALCULATED.3IONS-SCNC: 11 MMOL/L (ref 7–16)
AST SERPL-CCNC: 26 U/L (ref 0–39)
BACTERIA: ABNORMAL /HPF
BASOPHILS ABSOLUTE: 0.03 E9/L (ref 0–0.2)
BASOPHILS RELATIVE PERCENT: 0.3 % (ref 0–2)
BILIRUB SERPL-MCNC: 0.3 MG/DL (ref 0–1.2)
BILIRUBIN URINE: NEGATIVE
BLOOD, URINE: ABNORMAL
BUN BLDV-MCNC: 23 MG/DL (ref 6–23)
CALCIUM SERPL-MCNC: 9.2 MG/DL (ref 8.6–10.2)
CHLORIDE BLD-SCNC: 108 MMOL/L (ref 98–107)
CLARITY: CLEAR
CO2: 21 MMOL/L (ref 22–29)
COLOR: YELLOW
CREAT SERPL-MCNC: 1.3 MG/DL (ref 0.7–1.2)
EOSINOPHILS ABSOLUTE: 0.14 E9/L (ref 0.05–0.5)
EOSINOPHILS RELATIVE PERCENT: 1.3 % (ref 0–6)
GFR SERPL CREATININE-BSD FRML MDRD: 53 ML/MIN/1.73
GLUCOSE BLD-MCNC: 116 MG/DL (ref 74–99)
GLUCOSE URINE: NEGATIVE MG/DL
HCT VFR BLD CALC: 35.9 % (ref 37–54)
HEMOGLOBIN: 10.4 G/DL (ref 12.5–16.5)
IMMATURE GRANULOCYTES #: 0.04 E9/L
IMMATURE GRANULOCYTES %: 0.4 % (ref 0–5)
KETONES, URINE: NEGATIVE MG/DL
LACTIC ACID, SEPSIS: 0.9 MMOL/L (ref 0.5–1.9)
LEUKOCYTE ESTERASE, URINE: ABNORMAL
LYMPHOCYTES ABSOLUTE: 3.18 E9/L (ref 1.5–4)
LYMPHOCYTES RELATIVE PERCENT: 28.8 % (ref 20–42)
MCH RBC QN AUTO: 28.1 PG (ref 26–35)
MCHC RBC AUTO-ENTMCNC: 29 % (ref 32–34.5)
MCV RBC AUTO: 97 FL (ref 80–99.9)
MONOCYTES ABSOLUTE: 0.84 E9/L (ref 0.1–0.95)
MONOCYTES RELATIVE PERCENT: 7.6 % (ref 2–12)
NEUTROPHILS ABSOLUTE: 6.83 E9/L (ref 1.8–7.3)
NEUTROPHILS RELATIVE PERCENT: 61.6 % (ref 43–80)
NITRITE, URINE: POSITIVE
PDW BLD-RTO: 12.9 FL (ref 11.5–15)
PH UA: 6 (ref 5–9)
PLATELET # BLD: 356 E9/L (ref 130–450)
PMV BLD AUTO: 8.9 FL (ref 7–12)
POTASSIUM SERPL-SCNC: 4.4 MMOL/L (ref 3.5–5)
PROTEIN UA: 30 MG/DL
RBC # BLD: 3.7 E12/L (ref 3.8–5.8)
RBC UA: ABNORMAL /HPF (ref 0–2)
SODIUM BLD-SCNC: 140 MMOL/L (ref 132–146)
SPECIFIC GRAVITY UA: 1.01 (ref 1–1.03)
TOTAL PROTEIN: 6.5 G/DL (ref 6.4–8.3)
UROBILINOGEN, URINE: 0.2 E.U./DL
WBC # BLD: 11.1 E9/L (ref 4.5–11.5)
WBC UA: >20 /HPF (ref 0–5)

## 2022-11-21 PROCEDURE — 87088 URINE BACTERIA CULTURE: CPT

## 2022-11-21 PROCEDURE — 2580000003 HC RX 258: Performed by: EMERGENCY MEDICINE

## 2022-11-21 PROCEDURE — 6360000002 HC RX W HCPCS: Performed by: EMERGENCY MEDICINE

## 2022-11-21 PROCEDURE — 81001 URINALYSIS AUTO W/SCOPE: CPT

## 2022-11-21 PROCEDURE — 83605 ASSAY OF LACTIC ACID: CPT

## 2022-11-21 PROCEDURE — 88112 CYTOPATH CELL ENHANCE TECH: CPT

## 2022-11-21 PROCEDURE — 85025 COMPLETE CBC W/AUTO DIFF WBC: CPT

## 2022-11-21 PROCEDURE — 99223 1ST HOSP IP/OBS HIGH 75: CPT | Performed by: INTERNAL MEDICINE

## 2022-11-21 PROCEDURE — 2580000003 HC RX 258: Performed by: PHYSICIAN ASSISTANT

## 2022-11-21 PROCEDURE — 96375 TX/PRO/DX INJ NEW DRUG ADDON: CPT

## 2022-11-21 PROCEDURE — 6370000000 HC RX 637 (ALT 250 FOR IP): Performed by: PHYSICIAN ASSISTANT

## 2022-11-21 PROCEDURE — 87186 SC STD MICRODIL/AGAR DIL: CPT

## 2022-11-21 PROCEDURE — 1200000000 HC SEMI PRIVATE

## 2022-11-21 PROCEDURE — 99285 EMERGENCY DEPT VISIT HI MDM: CPT

## 2022-11-21 PROCEDURE — 6360000004 HC RX CONTRAST MEDICATION: Performed by: RADIOLOGY

## 2022-11-21 PROCEDURE — 96374 THER/PROPH/DIAG INJ IV PUSH: CPT

## 2022-11-21 PROCEDURE — 51702 INSERT TEMP BLADDER CATH: CPT

## 2022-11-21 PROCEDURE — 6370000000 HC RX 637 (ALT 250 FOR IP): Performed by: NURSE PRACTITIONER

## 2022-11-21 PROCEDURE — 74177 CT ABD & PELVIS W/CONTRAST: CPT

## 2022-11-21 PROCEDURE — 87077 CULTURE AEROBIC IDENTIFY: CPT

## 2022-11-21 PROCEDURE — 96376 TX/PRO/DX INJ SAME DRUG ADON: CPT

## 2022-11-21 PROCEDURE — 80053 COMPREHEN METABOLIC PANEL: CPT

## 2022-11-21 RX ORDER — LANOLIN ALCOHOL/MO/W.PET/CERES
400 CREAM (GRAM) TOPICAL DAILY
Status: DISCONTINUED | OUTPATIENT
Start: 2022-11-22 | End: 2022-11-28 | Stop reason: HOSPADM

## 2022-11-21 RX ORDER — SODIUM CHLORIDE 0.9 % (FLUSH) 0.9 %
5-40 SYRINGE (ML) INJECTION EVERY 12 HOURS SCHEDULED
Status: DISCONTINUED | OUTPATIENT
Start: 2022-11-21 | End: 2022-11-28 | Stop reason: HOSPADM

## 2022-11-21 RX ORDER — SODIUM CHLORIDE 9 MG/ML
INJECTION, SOLUTION INTRAVENOUS PRN
Status: DISCONTINUED | OUTPATIENT
Start: 2022-11-21 | End: 2022-11-28 | Stop reason: HOSPADM

## 2022-11-21 RX ORDER — MYCOPHENOLATE MOFETIL 250 MG/1
1500 CAPSULE ORAL 2 TIMES DAILY
Status: DISCONTINUED | OUTPATIENT
Start: 2022-11-21 | End: 2022-11-21

## 2022-11-21 RX ORDER — LISINOPRIL 10 MG/1
10 TABLET ORAL 2 TIMES DAILY
Status: DISCONTINUED | OUTPATIENT
Start: 2022-11-21 | End: 2022-11-24

## 2022-11-21 RX ORDER — MORPHINE SULFATE 2 MG/ML
2 INJECTION, SOLUTION INTRAMUSCULAR; INTRAVENOUS ONCE
Status: COMPLETED | OUTPATIENT
Start: 2022-11-21 | End: 2022-11-21

## 2022-11-21 RX ORDER — PANTOPRAZOLE SODIUM 40 MG/1
40 TABLET, DELAYED RELEASE ORAL
Status: DISCONTINUED | OUTPATIENT
Start: 2022-11-22 | End: 2022-11-28 | Stop reason: HOSPADM

## 2022-11-21 RX ORDER — INSULIN LISPRO 100 [IU]/ML
0-4 INJECTION, SOLUTION INTRAVENOUS; SUBCUTANEOUS NIGHTLY
Status: CANCELLED | OUTPATIENT
Start: 2022-11-21

## 2022-11-21 RX ORDER — FOLIC ACID 1 MG/1
1000 TABLET ORAL DAILY
Status: DISCONTINUED | OUTPATIENT
Start: 2022-11-22 | End: 2022-11-28 | Stop reason: HOSPADM

## 2022-11-21 RX ORDER — FINASTERIDE 5 MG/1
5 TABLET, FILM COATED ORAL DAILY
Status: DISCONTINUED | OUTPATIENT
Start: 2022-11-21 | End: 2022-11-24

## 2022-11-21 RX ORDER — ACETAMINOPHEN 650 MG/1
650 SUPPOSITORY RECTAL EVERY 6 HOURS PRN
Status: DISCONTINUED | OUTPATIENT
Start: 2022-11-21 | End: 2022-11-28 | Stop reason: HOSPADM

## 2022-11-21 RX ORDER — ATORVASTATIN CALCIUM 40 MG/1
80 TABLET, FILM COATED ORAL NIGHTLY
Status: DISCONTINUED | OUTPATIENT
Start: 2022-11-21 | End: 2022-11-28 | Stop reason: HOSPADM

## 2022-11-21 RX ORDER — POLYETHYLENE GLYCOL 3350 17 G/17G
17 POWDER, FOR SOLUTION ORAL DAILY PRN
Status: DISCONTINUED | OUTPATIENT
Start: 2022-11-21 | End: 2022-11-28 | Stop reason: HOSPADM

## 2022-11-21 RX ORDER — ONDANSETRON 4 MG/1
4 TABLET, ORALLY DISINTEGRATING ORAL EVERY 8 HOURS PRN
Status: DISCONTINUED | OUTPATIENT
Start: 2022-11-21 | End: 2022-11-28 | Stop reason: HOSPADM

## 2022-11-21 RX ORDER — DEXTROSE MONOHYDRATE 100 MG/ML
INJECTION, SOLUTION INTRAVENOUS CONTINUOUS PRN
Status: DISCONTINUED | OUTPATIENT
Start: 2022-11-21 | End: 2022-11-28 | Stop reason: HOSPADM

## 2022-11-21 RX ORDER — 0.9 % SODIUM CHLORIDE 0.9 %
500 INTRAVENOUS SOLUTION INTRAVENOUS ONCE
Status: DISCONTINUED | OUTPATIENT
Start: 2022-11-21 | End: 2022-11-21

## 2022-11-21 RX ORDER — INSULIN LISPRO 100 [IU]/ML
0-4 INJECTION, SOLUTION INTRAVENOUS; SUBCUTANEOUS
Status: CANCELLED | OUTPATIENT
Start: 2022-11-21

## 2022-11-21 RX ORDER — ACETAMINOPHEN 325 MG/1
650 TABLET ORAL EVERY 6 HOURS PRN
Status: DISCONTINUED | OUTPATIENT
Start: 2022-11-21 | End: 2022-11-28 | Stop reason: HOSPADM

## 2022-11-21 RX ORDER — ONDANSETRON 2 MG/ML
4 INJECTION INTRAMUSCULAR; INTRAVENOUS EVERY 6 HOURS PRN
Status: DISCONTINUED | OUTPATIENT
Start: 2022-11-21 | End: 2022-11-28 | Stop reason: HOSPADM

## 2022-11-21 RX ORDER — SODIUM CHLORIDE 0.9 % (FLUSH) 0.9 %
5-40 SYRINGE (ML) INJECTION PRN
Status: DISCONTINUED | OUTPATIENT
Start: 2022-11-21 | End: 2022-11-28 | Stop reason: HOSPADM

## 2022-11-21 RX ORDER — TAMSULOSIN HYDROCHLORIDE 0.4 MG/1
0.4 CAPSULE ORAL DAILY
Status: DISCONTINUED | OUTPATIENT
Start: 2022-11-22 | End: 2022-11-23

## 2022-11-21 RX ORDER — OXYCODONE HYDROCHLORIDE AND ACETAMINOPHEN 5; 325 MG/1; MG/1
1 TABLET ORAL ONCE AS NEEDED
Status: COMPLETED | OUTPATIENT
Start: 2022-11-21 | End: 2022-11-21

## 2022-11-21 RX ADMIN — IOPAMIDOL 75 ML: 755 INJECTION, SOLUTION INTRAVENOUS at 13:00

## 2022-11-21 RX ADMIN — ATORVASTATIN CALCIUM 80 MG: 40 TABLET, FILM COATED ORAL at 20:23

## 2022-11-21 RX ADMIN — MORPHINE SULFATE 2 MG: 2 INJECTION, SOLUTION INTRAMUSCULAR; INTRAVENOUS at 13:59

## 2022-11-21 RX ADMIN — LISINOPRIL 10 MG: 10 TABLET ORAL at 20:23

## 2022-11-21 RX ADMIN — OXYCODONE AND ACETAMINOPHEN 1 TABLET: 5; 325 TABLET ORAL at 22:20

## 2022-11-21 RX ADMIN — Medication 10 ML: at 20:23

## 2022-11-21 RX ADMIN — WATER 1000 MG: 1 INJECTION INTRAMUSCULAR; INTRAVENOUS; SUBCUTANEOUS at 13:46

## 2022-11-21 RX ADMIN — MORPHINE SULFATE 2 MG: 2 INJECTION, SOLUTION INTRAMUSCULAR; INTRAVENOUS at 16:27

## 2022-11-21 ASSESSMENT — PAIN DESCRIPTION - FREQUENCY: FREQUENCY: INTERMITTENT

## 2022-11-21 ASSESSMENT — PAIN - FUNCTIONAL ASSESSMENT
PAIN_FUNCTIONAL_ASSESSMENT: PREVENTS OR INTERFERES WITH MANY ACTIVE NOT PASSIVE ACTIVITIES
PAIN_FUNCTIONAL_ASSESSMENT: NONE - DENIES PAIN

## 2022-11-21 ASSESSMENT — PAIN SCALES - GENERAL
PAINLEVEL_OUTOF10: 2
PAINLEVEL_OUTOF10: 7
PAINLEVEL_OUTOF10: 10
PAINLEVEL_OUTOF10: 8

## 2022-11-21 ASSESSMENT — PAIN DESCRIPTION - LOCATION: LOCATION: PENIS

## 2022-11-21 ASSESSMENT — PAIN DESCRIPTION - DESCRIPTORS: DESCRIPTORS: DISCOMFORT;SPASM;SHOOTING

## 2022-11-21 ASSESSMENT — PAIN DESCRIPTION - PAIN TYPE: TYPE: ACUTE PAIN

## 2022-11-21 ASSESSMENT — PAIN DESCRIPTION - ONSET: ONSET: ON-GOING

## 2022-11-21 ASSESSMENT — PAIN DESCRIPTION - ORIENTATION: ORIENTATION: DISTAL

## 2022-11-21 NOTE — ED PROVIDER NOTES
HPI:  11/21/22,   Time: 11:53 AM CHRISTOPHER Neal is a 81 y.o. male presenting to the ED for dysuria and decreased urinary stream, beginning 4 days ago. The complaint has been persistent, moderate in severity, and worsened by nothing. Patient is a pleasant 81 old gentleman history of enlarged prostate. He follows with urology, Dr. Roseline Knight. Patient states that for the past month he has been told that if he has any difficulty urinate that he can intermittently straight cath. He really has not had to do that at home. He and his wife states that he is actually been having good urinary output and normal stream.  The last couple days however he is noticed decreased stream and has had a lot of dysuria. Said increased urinary frequency, as well. No fevers or chills no nausea no vomiting. It increased dysuria this morning so came in for further evaluation. He saw 1 clot of blood 4 days ago in the urine but no bleeding since then. No hematuria or pink-tinged urine since that time. Patient denies urinary just distention at this time. No abdominal pain. No flank pain. No diarrhea no chest pain or shortness of breath. Review of Systems:   Pertinent positives and negatives are stated within HPI, all other systems reviewed and are negative.    --------------------------------------------- PAST HISTORY ---------------------------------------------  Past Medical History:  has a past medical history of Aortic valve replaced, Cancer ([facility] [address] Diabetes mellitus ([facility] [address] Elevated cholesterol, Hypertension, Pacemaker, Prostate enlargement, Pulmonary emboli ([facility] [address] S/P CABG x 1, and TIA (transient ischemic attack). Past Surgical History:  has a past surgical history that includes Cardiac surgery (07/06/2015) and pacemaker placement. Social History:  reports that he has quit smoking. He has quit using smokeless tobacco. He reports current alcohol use. He reports that he does not use drugs.     Family History: family history is not on file. The patients home medications have been reviewed. Allergies: Patient has no known allergies. ---------------------------------------------------PHYSICAL EXAM--------------------------------------    Constitutional/General: Alert and oriented x3, well appearing, non toxic in NAD  Head: Normocephalic and atraumatic  Eyes: PERRL, EOMI, conjunctive normal, sclera non icteric  Mouth: Oropharynx clear, handling secretions, no trismus, no asymmetry of the posterior oropharynx or uvular edema  Neck: Supple, full ROM, non tender to palpation in the midline, no stridor, no crepitus, no meningeal signs  Respiratory: Lungs clear to auscultation bilaterally, no wheezes, rales, or rhonchi. Not in respiratory distress  Cardiovascular:  Regular rate. Regular rhythm. No murmurs, gallops, or rubs. 2+ distal pulses  Chest: No chest wall tenderness  GI:  Abdomen Soft, Non tender, Non distended. +BS. No organomegaly, no palpable masses,  No rebound, guarding, or rigidity. Musculoskeletal: Moves all extremities x 4. Warm and well perfused, no clubbing, cyanosis, or edema. Capillary refill <3 seconds  Integument: skin warm and dry. No rashes. Lymphatic: no lymphadenopathy noted  Neurologic: GCS 15, no focal deficits, symmetric strength 5/5 in the upper and lower extremities bilaterally  Psychiatric: Normal Affect    -------------------------------------------------- RESULTS -------------------------------------------------  I have personally reviewed all laboratory and imaging results for this patient. Results are listed below.      LABS:  Results for orders placed or performed during the hospital encounter of 11/21/22   CBC with Auto Differential   Result Value Ref Range    WBC 11.1 4.5 - 11.5 E9/L    RBC 3.70 (L) 3.80 - 5.80 E12/L    Hemoglobin 10.4 (L) 12.5 - 16.5 g/dL    Hematocrit 35.9 (L) 37.0 - 54.0 %    MCV 97.0 80.0 - 99.9 fL    MCH 28.1 26.0 - 35.0 pg    MCHC 29.0 (L) 32.0 - 34.5 %    RDW 12.9 11.5 - 15.0 fL    Platelets 001 806 - 177 E9/L    MPV 8.9 7.0 - 12.0 fL    Neutrophils % 61.6 43.0 - 80.0 %    Immature Granulocytes % 0.4 0.0 - 5.0 %    Lymphocytes % 28.8 20.0 - 42.0 %    Monocytes % 7.6 2.0 - 12.0 %    Eosinophils % 1.3 0.0 - 6.0 %    Basophils % 0.3 0.0 - 2.0 %    Neutrophils Absolute 6.83 1.80 - 7.30 E9/L    Immature Granulocytes # 0.04 E9/L    Lymphocytes Absolute 3.18 1.50 - 4.00 E9/L    Monocytes Absolute 0.84 0.10 - 0.95 E9/L    Eosinophils Absolute 0.14 0.05 - 0.50 E9/L    Basophils Absolute 0.03 0.00 - 0.20 E9/L   Urinalysis with Microscopic   Result Value Ref Range    Color, UA Yellow Straw/Yellow    Clarity, UA Clear Clear    Glucose, Ur Negative Negative mg/dL    Bilirubin Urine Negative Negative    Ketones, Urine Negative Negative mg/dL    Specific Gravity, UA 1.015 1.005 - 1.030    Blood, Urine MODERATE (A) Negative    pH, UA 6.0 5.0 - 9.0    Protein, UA 30 (A) Negative mg/dL    Urobilinogen, Urine 0.2 <2.0 E.U./dL    Nitrite, Urine POSITIVE (A) Negative    Leukocyte Esterase, Urine LARGE (A) Negative    WBC, UA >20 (A) 0 - 5 /HPF    RBC, UA 1-3 0 - 2 /HPF    Bacteria, UA MANY (A) None Seen /HPF   Lactate, Sepsis   Result Value Ref Range    Lactic Acid, Sepsis 0.9 0.5 - 1.9 mmol/L   Comprehensive Metabolic Panel   Result Value Ref Range    Sodium 140 132 - 146 mmol/L    Potassium 4.4 3.5 - 5.0 mmol/L    Chloride 108 (H) 98 - 107 mmol/L    CO2 21 (L) 22 - 29 mmol/L    Anion Gap 11 7 - 16 mmol/L    Glucose 116 (H) 74 - 99 mg/dL    BUN 23 6 - 23 mg/dL    Creatinine 1.3 (H) 0.7 - 1.2 mg/dL    Est, Glom Filt Rate 53 >=60 mL/min/1.73    Calcium 9.2 8.6 - 10.2 mg/dL    Total Protein 6.5 6.4 - 8.3 g/dL    Albumin 3.8 3.5 - 5.2 g/dL    Total Bilirubin 0.3 0.0 - 1.2 mg/dL    Alkaline Phosphatase 94 40 - 129 U/L    ALT 21 0 - 40 U/L    AST 26 0 - 39 U/L       RADIOLOGY:  Interpreted by Radiologist.  CT ABDOMEN PELVIS W IV CONTRAST Additional Contrast? None   Final Result Currie catheter in the bladder with decompressed bladder demonstrating   irregularly thickened wall, suspicious for underlying malignancy. 1.8 cm   cystic structure in in the posterolateral region of the bladder, suspicious   for bladder diverticulum or ureterocele. Urologic consultation and/or   cystoscopy evaluation recommended. Enlarged prostate gland. Multiple bilateral renal cysts. Constipation.             ------------------------- NURSING NOTES AND VITALS REVIEWED ---------------------------   The nursing notes within the ED encounter and vital signs as below have been reviewed by myself. BP (!) 162/73   Pulse 82   Temp 98.1 °F (36.7 °C) (Oral)   Resp 16   Ht 5' 6\" (1.676 m)   Wt 170 lb (77.1 kg)   SpO2 99%   BMI 27.44 kg/m²   Oxygen Saturation Interpretation: Normal    The patients available past medical records and past encounters were reviewed. ------------------------------ ED COURSE/MEDICAL DECISION MAKING----------------------  Medications   iopamidol (ISOVUE-370) 76 % injection 75 mL (75 mLs IntraVENous Given 11/21/22 1300)   cefTRIAXone (ROCEPHIN) 1,000 mg in sterile water 10 mL IV syringe (1,000 mg IntraVENous Given 11/21/22 1346)   morphine (PF) injection 2 mg (2 mg IntraVENous Given 11/21/22 1359)   morphine (PF) injection 2 mg (2 mg IntraVENous Given 11/21/22 1627)         ED COURSE:  ED Course as of 11/21/22 1655   Mon Nov 21, 2022   1354 Currie catheter was placed about 500 mils came out. Nurse states it was initially clear yellow and now it is draining bloody urine. No clots seen. Patient is on aspirin and Plavix at baseline. [QU]   7260 WIQXD to urology, the nurse practitioner William mejia. She will come down to evaluate the patient. [VW]   7124 I spoke to urology. They did place a full three-way Currie catheter for further bladder irrigation. They request the patient be admitted. Patient is chronically on Eliquis.   They will discuss with cardiology about holding it at this time. Patient will be admitted to medicine. [ZP]   1957 Call placed to hospitalist for admission. They will call back when they are available to take report. [TM]   1054 . Spoke to the hospitalist agreed admit the patient. Dr. Bernice Mac accepted the patient time. Celiaosmany Leon      ED Course User Index  [KK] Severa Rink, MD       Medical Decision Making:    Patient is a pleasant 79-year-old male who presents with dysuria and decreased urinary stream for the past 4 days no fevers or chills he has had issues with enlarged prostate and has had a self cath in the past.  Follows with urology. We will do bladder scan and CT lab work check urine. Different diagnosis is UTI urinary retention NAHEED kidney stone      This patient has remained hemodynamically stable during their ED course. CBC is normal white count 11.1. Hemoglobin 10. 4. Urinalysis is positive for nitrites and leuks he was started on IV Rocephin. Chemistry is normal other than a slight bump in creatinine to 1.3. Baseline is 1.1. CT abdomen pelvis showed Currie catheter in place with thickened bladder wall cystic structure in the posterior portion of the bladder could be a bladder diverticulum. Patient recommended for cystoscopy to rule out malignancy. Currie catheter was placed initially was clear yellow urine and then drained bloody urine. Urology was consulted. They placed a three-way Currie catheter for bladder irrigation and recommended admission. Patient is on Eliquis that will be held at this time. Cardiology consult in house. I spoke to the hospitalist agreed for admission. Patient stable for admission. Re-Evaluations:             Re-evaluation. Patients symptoms are improving        Counseling: The emergency provider has spoken with the patient and discussed todays results, in addition to providing specific details for the plan of care and counseling regarding the diagnosis and prognosis.   Questions are answered at this time and they are agreeable with the plan.       --------------------------------- IMPRESSION AND DISPOSITION ---------------------------------    IMPRESSION  1. Acute cystitis with hematuria    2. Gross hematuria    3. BPH with elevated PSA        DISPOSITION  Disposition: Admit to med/surg floor  Patient condition is fair    NOTE: This report was transcribed using voice recognition software.  Every effort was made to ensure accuracy; however, inadvertent computerized transcription errors may be present        Walt Woo MD  11/22/22 2905

## 2022-11-21 NOTE — ED NOTES
Daughter, Cristel Bustamante, would like to be present when results are given. She is in waiting room.      Deedee Chiang RN  11/21/22 1329

## 2022-11-21 NOTE — ED NOTES
Department of Emergency Medicine  FIRST PROVIDER TRIAGE NOTE             Independent MLP           11/21/22  11:37 AM EST    Date of Encounter: 11/21/22   MRN: [id number]      HPI: Fili Vázquez is a 81 y.o. male who presents to the ED for No chief complaint on file. Ongoing painful urination and dysuria for two weeks. Patient has been seen by a physician multiple times. Patient has been instructed to self calth due to retention but then patient was voiding spontaneously. ROS: Negative for cp, sob, fever, or cough. PE: Gen Appearance/Constitutional: alert  GCS 15, no fevers, no shortness of breath. Initial Plan of Care: All treatment areas with department are currently occupied. Plan to order/Initiate the following while awaiting opening in ED: labs and ultrasound.   Initiate Treatment-Testing, Proceed toTreatment Area When Bed Available for ED Attending/MLP to Continue Care    Electronically signed by RENETTA Padron CNP   DD: 11/21/22      RENETTA Padron CNP  11/21/22 9777

## 2022-11-21 NOTE — H&P
[facility] History and Physical      CHIEF COMPLAINT:  Painful urination/ dysuria    History of Present Illness:      81year old male with a past medical history of  Aortic valve replacement, bullous pemphigoid, DM2, atrial fibrillation with pacemaker placed, BPH, hyperlipidemia, CABG, carotid stenosis, hypertension, anemia, and TIA presented to the ED for dysuria. Patient has had ongoing dysuria and difficulty urinating. Patient states for the past week he has had increased frequency and urgency of urinating. Also having burning pain at urethral opening prior to urinating. Pain would resolve with urination and when patient would drink more water. On Thursday, patient noticed large clot in urine. Urology was called on Friday but patient did not hear back from office. Due to continued pain he presented to ED. States he only self cath twice about 2 weeks ago, and since then has had spontaneous urination. Denies fevers, chills, CP, or increased SOB. Denies smoking, drug or alcohol abuse. No known allergies. No pertinent family history. Informant(s) for H&P: Patient and EMR    REVIEW OF SYSTEMS:  A comprehensive review of systems was negative except for: what is in the HPI    PMH:  Past Medical History:   Diagnosis Date    Aortic valve replaced 2015    Cancer ([facility] [address]     skin - removed     Diabetes mellitus ([facility] [address]     Elevated cholesterol     Hypertension     Pacemaker 2016    Prostate enlargement     Pulmonary emboli ([facility] [address] 2011    S/P CABG x 1 2015    TIA (transient ischemic attack) 2016       Surgical History:  Past Surgical History:   Procedure Laterality Date    CARDIAC SURGERY  07/06/2015    AORTIV VALVE REPLACEMENT & BYPASS    PACEMAKER PLACEMENT         Medications Prior to Admission:    Prior to Admission medications    Medication Sig Start Date End Date Taking? Authorizing Provider   nitroGLYCERIN (NITROSTAT) 0.4 MG SL tablet up to max of 3 total doses.  If no relief after 1 dose, call 911. 4/23/19   Alvina Merlos MD   lisinopril (PRINIVIL;ZESTRIL) 10 MG tablet Take 10 mg by mouth 2 times daily    Historical Provider, MD   magnesium oxide (MAG-OX) 400 MG tablet Take 400 mg by mouth daily    Historical Provider, MD   atorvastatin (LIPITOR) 80 MG tablet Take 80 mg by mouth nightly    Historical Provider, MD   vitamin B-12 (CYANOCOBALAMIN) 500 MCG tablet Take 500 mcg by mouth daily    Historical Provider, MD   mycophenolate (CELLCEPT) 250 MG capsule Take by mouth 2 times daily 500mg in the morning and 250mg at night    Historical Provider, MD   folic acid (FOLVITE) 414 MCG tablet Take 800 mcg by mouth daily    Historical Provider, MD   clopidogrel (PLAVIX) 75 MG tablet Take 75 mg by mouth daily    Historical Provider, MD   aspirin 81 MG tablet Take 81 mg by mouth daily  Patient not taking: Reported on 5/12/2022    Historical Provider, MD   vitamin D-3 (CHOLECALCIFEROL) 5000 UNITS TABS Take 5,000 Units by mouth daily    Historical Provider, MD   finasteride (PROSCAR) 5 MG tablet Take 5 mg by mouth daily    Historical Provider, MD   metFORMIN (GLUCOPHAGE) 500 MG tablet Take 500 mg by mouth 2 times daily (with meals)    Historical Provider, MD   metoprolol (LOPRESSOR) 25 MG tablet Take 25 mg by mouth 2 times daily    Historical Provider, MD   omeprazole (PRILOSEC) 20 MG capsule Take 20 mg by mouth 2 times daily    Historical Provider, MD   tamsulosin (FLOMAX) 0.4 MG capsule Take 0.4 mg by mouth daily    Historical Provider, MD       Allergies:    Patient has no known allergies. Social History:    reports that he has quit smoking. He has quit using smokeless tobacco. He reports current alcohol use. He reports that he does not use drugs. Family History:   family history is not on file.        PHYSICAL EXAM:  Vitals:  BP (!) 162/73   Pulse 82   Temp 98.1 °F (36.7 °C) (Oral)   Resp 16   Ht 5' 6\" (1.676 m)   Wt 170 lb (77.1 kg)   SpO2 99%   BMI 27.44 kg/m²   General Appearance: alert and oriented to person, place and time and in no acute distress, gross blood in moran bag   Skin: warm and dry  Head: normocephalic and atraumatic  Eyes: pupils equal, round, and reactive to light, extraocular eye movements intact, conjunctivae normal  Neck: neck supple and non tender without mass   Pulmonary/Chest: clear to auscultation bilaterally- no wheezes, rales or rhonchi, normal air movement, no respiratory distress  Cardiovascular: normal rate, normal S1 and S2 and no carotid bruits  Abdomen: soft, minimally tender throughout, non-distended, normal bowel sounds, no masses or organomegaly  Extremities: no cyanosis, no clubbing and no edema  Neurologic: no cranial nerve deficit and speech normal        LABS:  Recent Labs     11/21/22  1204      K 4.4   *   CO2 21*   BUN 23   CREATININE 1.3*   GLUCOSE 116*   CALCIUM 9.2       Recent Labs     11/21/22  1204   WBC 11.1   RBC 3.70*   HGB 10.4*   HCT 35.9*   MCV 97.0   MCH 28.1   MCHC 29.0*   RDW 12.9      MPV 8.9       No results for input(s): POCGLU in the last 72 hours. Radiology:   CT ABDOMEN PELVIS W IV CONTRAST Additional Contrast? None   Final Result   Moran catheter in the bladder with decompressed bladder demonstrating   irregularly thickened wall, suspicious for underlying malignancy. 1.8 cm   cystic structure in in the posterolateral region of the bladder, suspicious   for bladder diverticulum or ureterocele. Urologic consultation and/or   cystoscopy evaluation recommended. Enlarged prostate gland. Multiple bilateral renal cysts. Constipation. EKG: None      ASSESSMENT:      Active Problems:    * No active hospital problems. *  Resolved Problems:    * No resolved hospital problems. *      PLAN:    1. Gross hematuria: Follows outpatient with Urology.  CT A/P showing moran with decompressed bladder, irregularly thickened wall, suspicious for underlying malignancy, 1.8 cm cystic structure in the posterolateral region of the bladder, suspicious for bladder diverticulum or uterocele. Enlarged prostate gland. Multiple renal cysts. Constipation. Urology consulted to the ED. Moran place in the ED with >300 ml of output. Hand irrigated with multiple clots. Initially pink tinged but turned to gross blood. Moran was removed and 24 F Simplastic moran placed, with large clot retrieval. Continue CBI. Manually irrigate moran q2h. Cultures pending. Monitor H&H. Consult cardiology regarding Eliquis. 2. UTI: UA with moderate blood, positive nitrite, large leukocyte esterase, and many bacteria. Received Rocephin in the ED. Urine culture ordered but not collected prior to Rocephin dose. Continue Rocephin. ID consulted. 3. Hx of BPH: Elevated PSA. On Flomax and Proscar. 4. Atrial fibrillation: Diagnosed in June 2019. Pacemaker placed in 11/16. Follows with EP. On Eliquis, but held due to gross hematuria. Continue Lopressor. Cardiology consulted     5. Hyperlipidemia: Continue Lipitor     6. Hypertension: Continue Lisinopril and Lopressor     7. DM2: Diet controlled. DM diet. Hypoglycemic protocol. 8. Hx of TIA: Holding Plavix due to bleeding. 9. Hx of aortic valve replacement     10. Hx of CABG in July 2015    11. Carotid stenosis: Follows with vascular surgery     12. Anemia:  Likely secondary to hematuria. Hgb 10.4. Monitor H&h. Transfuse prn for hgb <7.    13. Hx of bullous pemphigoid: Stable. Hold Cellcept. Follows with [employer]. Code Status: Full   DVT prophylaxis: SCDs due to gross hematuria       NOTE: This report was transcribed using voice recognition software. Every effort was made to ensure accuracy; however, inadvertent computerized transcription errors may be present.   Electronically signed by Mack Miller PA-C on 11/21/2022 at 4:51 PM     45 minutes time spent reviewing patient chart, assessing patient, discussing plan of care with patient and family, discussing plan of care with collaborating physician, and charting. HOSPITALIST ATTENDING PHYSICIAN NOTE 11/21/2022 1843PM:    Details of the evaluation - subjective assessment (including medication profile, past medical, family and social history when applicable), examination, review of lab and test data, diagnostic impressions and medical decision making - performed by Mack Miller PA-C, were discussed with me on the date of service and I agree with clinical information herein unless otherwise noted. The patient has been evaluated by me personally earlier today. Pt reports no fevers, chills,n/v. PHYSICAL EXAM:    Vitals:  BP (!) 164/70   Pulse 94   Temp 99.3 °F (37.4 °C)   Resp 20   Ht 5' 6\" (1.676 m)   Wt 170 lb (77.1 kg)   SpO2 96%   BMI 27.44 kg/m²     General:  Appears uncomfortable. Answers questions appropriately and cooperative with exam  HEENT:  Mucous membranes moist. No erythema, rhinorrhea, or post-nasal drip noted. Neck:  No carotid bruits. Heart:  Rhythm regular at rate of 96  Lungs:  CTA. No wheeze, rales, or rhonchi  Abdomen:  Positive bowel sounds positive. Soft. Non-tender. No guarding, rebound or rigidity. Breast/Rectal/Genitourinary: not pertinent. Extremities:  Negative for lower extremity edema  Skin:  Warm and dry  Vascular: 2/4 Dorsalis Pedis pulses bilaterally. Neuro:  Cranial nerves 2-12 grossly intact, no focal weakness or change in sensation noted. Extraocular muscles intact. Pupils equal, round, reactive to light. Currie noted with blood in it    I agree with the assessment and plan of Pollo Eldridge PA-C    Hematuria  Uti with urinary symptoms on cellcept  Anemia with acute blood loss component  Atrial fib  Acidosis  Bullous pemphigoid  Htn  hyperlipidemia    Electronically signed by Ning Guadalupe D.O.   Hospitalist  [facility] at [facility]

## 2022-11-21 NOTE — DISCHARGE INSTR - COC
Continuity of Care Form    Patient Name: Lisa Mckeon   :  1935  MRN:  [id number]    Admit date:  2022  Discharge date:  ***    Code Status Order: Prior   Advance Directives:     Admitting Physician:  No admitting provider for patient encounter.   PCP: Mike Fowler MD    Discharging Nurse: [facility] Unit/Room#:   Discharging Unit Phone Number: ***    Emergency Contact:   Extended Emergency Contact Information  Primary Emergency Contact: NanAna  Address: [address] [place] [address]           [facility] Phone: [phone]  Mobile Phone: [phone]  Relation: Spouse  Secondary Emergency Contact: Iva Brody  Address: [address] [place] [address], [address] Phone: [phone]  Mobile Phone: [phone]  Relation: Child    Past Surgical History:  Past Surgical History:   Procedure Laterality Date    CARDIAC SURGERY  2015    AORTIV VALVE REPLACEMENT & BYPASS    PACEMAKER PLACEMENT         Immunization History:   Immunization History   Administered Date(s) Administered    Influenza Virus Vaccine 10/22/2018       Active Problems:  Patient Active Problem List   Diagnosis Code    Mobitz II I44.1    Bradycardia R00.1    Chest pain R07.9    Coronary artery disease involving native coronary artery of native heart without angina pectoris I25.10    Complete heart block ([address] I44.2    Aortic valve disease I35.9    Type 2 diabetes mellitus without complication, without long-term current use of insulin (HCC) E11.9    Pure hypercholesterolemia E78.00    Essential hypertension I10    Near syncope R55       Isolation/Infection:   Isolation            No Isolation          Patient Infection Status       None to display            Nurse Assessment:  Last Vital Signs: BP (!) 162/73   Pulse 82   Temp 98.1 °F (36.7 °C) (Oral)   Resp 16   Ht 5' 6\" (1.676 m)   Wt 170 lb (77.1 kg)   SpO2 99%   BMI 27.44 kg/m²     Last documented pain score (0-10 scale):    Last Weight:   Wt Readings from Last 1 Encounters:   22 170 lb (77.1 kg)     Mental Status:  {IP PT MENTAL STATUS:83102}    IV Access:  [address] IV ACCESS:196743103}    Nursing Mobility/ADLs:  Walking   {CHP DME IBTZ:566419821}  Transfer  {CHP DME XRZY:768737105}  Bathing  {CHP DME ZHHO:107948016}  Dressing  {CHP DME KEKI:456795323}  Toileting  {CHP DME UTFP:826237478}  Feeding  {CHP DME LRE}  Med Admin  {P DME QSXI:628668461}  Med Delivery   {[facility] MED Delivery:779928406}    Wound Care Documentation and Therapy:        Elimination:  Continence: Bowel: {YES / CP:84022}  Bladder: {YES / WT:82113}  Urinary Catheter: {Urinary Catheter:233463784}   Colostomy/Ileostomy/Ileal Conduit: {YES / SC:29059}       Date of Last BM: ***  No intake or output data in the 24 hours ending 22 1151  No intake/output data recorded.     Safety Concerns:     [address] Safety Concerns:691781495}    Impairments/Disabilities:      [address] Impairments/Disabilities:228910328}    Nutrition Therapy:  Current Nutrition Therapy:   [address] Diet List:859978516}    Routes of Feeding: {[facility] DME Other Feedings:778501585}  Liquids: {Slp liquid thickness:35661}  Daily Fluid Restriction: {CHP DME Yes amt example:766463533}  Last Modified Barium Swallow with Video (Video Swallowing Test): {Done Not Done NXQN:795642255}    Treatments at the Time of Hospital Discharge:   Respiratory Treatments: ***  Oxygen Therapy:  {Therapy; copd oxygen:34280}  Ventilator:    { CC Vent UJL}    Rehab Therapies: {THERAPEUTIC INTERVENTION:7858314054}  Weight Bearing Status/Restrictions: [address]  Weight Bearin}  Other Medical Equipment (for information only, NOT a DME order):  {EQUIPMENT:635372351}  Other Treatments: ***    Patient's personal belongings (please select all that are sent with patient):  {[facility]ANT[facility] DME Belongings:150803358}    RN SIGNATURE:  {Esignature:393291132}    CASE MANAGEMENT/SOCIAL WORK SECTION    Inpatient Status Date: ***    Readmission Risk Assessment Score:  Readmission Risk              Risk of Unplanned Readmission:  0           Discharging to Facility/ Agency   Name:   Address:  Phone:  Fax:    Dialysis Facility (if applicable)   Name:  Address:  Dialysis Schedule:  Phone:  Fax:    / signature: {Esignature:031085187}    PHYSICIAN SECTION    Prognosis: {Prognosis:1610143916}    Condition at Discharge: [address] Serge Patient Condition:472489664}    Rehab Potential (if transferring to Rehab): {Prognosis:0996857397}    Recommended Labs or Other Treatments After Discharge: ***    Physician Certification: I certify the above information and transfer of Minesh Castañeda  is necessary for the continuing treatment of the diagnosis listed and that he requires {Admit to Appropriate Level of Care:00549} for {GREATER/LESS:056393943} 30 days.      Update Admission H&P: {CHP DME Changes in GBYOA:895064944}    PHYSICIAN SIGNATURE:  {Esignature:758339852}

## 2022-11-21 NOTE — CONSULTS
11/21/2022 3:46 PM  Yasmine Newby  [id number]     Chief Complaint:    Gross hematuria, urinary retention      History of Present Illness: The patient is a 81 y.o. male patient who presented to the hospital with complaints of dysuria and difficulty urinating that began last week. He had a moran catheter inserted in the ER for >300ml of output. Per nursing this was initially pink-tinged but turned to grossly bloody shortly after. He is known to our practice and is a patient of Dr. Hiral Restrepo. Is a history of BPH, ROSAS, and elevated PSA  He takes Flomax BID   He self caths PRN at home  He does take Eliquis for history of Afib. Past Medical History:   Diagnosis Date    Aortic valve replaced 2015    Cancer ([facility] [address]     skin - removed     Diabetes mellitus ([facility] [address]     Elevated cholesterol     Hypertension     Pacemaker 2016    Prostate enlargement     Pulmonary emboli ([facility] [address] 2011    S/P CABG x 1 2015    TIA (transient ischemic attack) 2016         Past Surgical History:   Procedure Laterality Date    CARDIAC SURGERY  07/06/2015    AORTIV VALVE REPLACEMENT & BYPASS    PACEMAKER PLACEMENT         Medications Prior to Admission:    Not in a hospital admission. Allergies:    Patient has no known allergies. Social History:    reports that he has quit smoking. He has quit using smokeless tobacco. He reports current alcohol use. He reports that he does not use drugs. Family History:   Non-contributory to this Urological problem  family history is not on file.     Review of Systems:  Constitutional: No fever or chills   Respiratory: negative for cough and hemoptysis  Cardiovascular: negative for chest pain and dyspnea  Gastrointestinal: negative for abdominal pain, diarrhea, nausea and vomiting   Derm: negative for rash and skin lesion(s)  Neurological: negative for seizures and tremors  Musculoskeletal: Negative    Psychiatric: Negative   : As above in the HPI, otherwise negative  All other reviews are negative    Physical Exam:     Vitals:  BP (!) 162/73   Pulse 82   Temp 98.1 °F (36.7 °C) (Oral)   Resp 16   Ht 5' 6\" (1.676 m)   Wt 170 lb (77.1 kg)   SpO2 99%   BMI 27.44 kg/m²     General:  Awake, alert, oriented X 3. No apparent distress. HEENT:  Normocephalic, atraumatic. Lungs:  Respirations symmetric and non-labored. Abdomen:  soft, nontender, no masses  Extremities:  No clubbing, cyanosis, or edema  Skin:  Warm and dry, no open lesions or rashes  Neuro: There are no motor or sensory deficits in the 4 quadrant extremities   Rectal: deferred  Genitourinary:  moran with dark cherry colored urine    Labs:     Recent Labs     11/21/22  1204   WBC 11.1   RBC 3.70*   HGB 10.4*   HCT 35.9*   MCV 97.0   MCH 28.1   MCHC 29.0*   RDW 12.9      MPV 8.9         Recent Labs     11/21/22  1204   CREATININE 1.3*       No results found for: PSA    Imaging:   Narrative   EXAMINATION:   CT OF THE ABDOMEN AND PELVIS WITH CONTRAST 11/21/2022 1:04 pm       TECHNIQUE:   CT of the abdomen and pelvis was performed with the administration of   intravenous contrast. Multiplanar reformatted images are provided for review. Automated exposure control, iterative reconstruction, and/or weight based   adjustment of the mA/kV was utilized to reduce the radiation dose to as low   as reasonably achievable. COMPARISON:   None. HISTORY:   ORDERING SYSTEM PROVIDED HISTORY: urinary retention, dysuria   TECHNOLOGIST PROVIDED HISTORY:   Additional Contrast?->None   Reason for exam:->urinary retention, dysuria   Decision Support Exception - unselect if not a suspected or confirmed   emergency medical condition->Emergency Medical Condition (MA)       FINDINGS:   Lower Chest: No infiltrates or pleural effusion. Chronic parenchymal   changes. Cardiomegaly. Organs: No focal liver lesions. Gallbladder is present with no calcified   stones. Spleen is not enlarged. Pancreas and adrenal glands appear   unremarkable. There is symmetric enhancement of the kidneys with no   hydronephrosis. There are multiple bilateral renal cysts. GI/Bowel: There are no obstructing or constricting lesions. Large amount of   retained stool in the colon. Sigmoid diverticulosis. Pelvis: Bladder is decompressed with irregular thickening of the wall. 1.8   cm cystic structure directly posterolateral region of the bladder, suspicious   for bladder diverticulum or ureterocele. Currie catheter in place. Prostate   gland is enlarged. There is no significant free fluid. Peritoneum/Retroperitoneum: Free air or significant adenopathy. Bones/Soft Tissues: Unremarkable. Impression   Currie catheter in the bladder with decompressed bladder demonstrating   irregularly thickened wall, suspicious for underlying malignancy. 1.8 cm   cystic structure in in the posterolateral region of the bladder, suspicious   for bladder diverticulum or ureterocele. Urologic consultation and/or   cystoscopy evaluation recommended. Enlarged prostate gland. Multiple bilateral renal cysts. Constipation. Assessment/plan:  Gross hematuria  Clot retention  BPH  ROSAS  Elevated PSA    His Currie catheter was hand irrigated multiple clots retrieved, Currie remained grossly bloody. At this time decision was made to remove the Currie catheter. A 24F Simplastic Currie catheter was inserted into his bladder by Dr. Rach Mansfield.   This was then hand irrigated for large clot retrieval.  He was started on CBI      Continue CBI  Manually irrigate the Currie every 2 hours  Consult to cardiology regarding Eliquis and gross hematuria   Cultures pending  Antibiotics per primary   Cont to watch the hemoglobin   Will follow       Electronically signed by RENETTA Salguero CNP on 11/21/2022 at 3:46 PM  [facility]

## 2022-11-21 NOTE — CONSULTS
I agree with the assessment and plan of WAQAR Mendez. I personally evaluated the patient and made any changes to reflect my impression and plan. Gross hematuria. Likely secondary to Eliquis. Currie catheter placed. Continuous irrigation.

## 2022-11-21 NOTE — PROGRESS NOTES
Database initiated pharmacy and medications verified with the patient. He is A&O from home with wife. States he ambulates with a cane when he leaves the house but around the house he does not need it. Wife states he has short term memory loss and can get confused sometimes.

## 2022-11-21 NOTE — ED NOTES
Per Dr Jodi Bacon, continuous irrigation until clear or otherwise notified.   Pt on bags 4 and 5 of irrigation      Maxine Ling RN  11/21/22 2511

## 2022-11-22 ENCOUNTER — ANESTHESIA EVENT (OUTPATIENT)
Dept: OPERATING ROOM | Age: 87
DRG: 988 | End: 2022-11-22
Payer: MEDICARE

## 2022-11-22 ENCOUNTER — ANESTHESIA (OUTPATIENT)
Dept: OPERATING ROOM | Age: 87
DRG: 988 | End: 2022-11-22
Payer: MEDICARE

## 2022-11-22 ENCOUNTER — APPOINTMENT (OUTPATIENT)
Dept: GENERAL RADIOLOGY | Age: 87
DRG: 988 | End: 2022-11-22
Payer: MEDICARE

## 2022-11-22 PROBLEM — N39.0 COMPLICATED UTI (URINARY TRACT INFECTION): Status: ACTIVE | Noted: 2022-11-22

## 2022-11-22 LAB
ABO/RH: NORMAL
ANION GAP SERPL CALCULATED.3IONS-SCNC: 11 MMOL/L (ref 7–16)
ANTIBODY SCREEN: NORMAL
BASOPHILS ABSOLUTE: 0.05 E9/L (ref 0–0.2)
BASOPHILS RELATIVE PERCENT: 0.3 % (ref 0–2)
BUN BLDV-MCNC: 18 MG/DL (ref 6–23)
CALCIUM SERPL-MCNC: 9 MG/DL (ref 8.6–10.2)
CHLORIDE BLD-SCNC: 105 MMOL/L (ref 98–107)
CO2: 21 MMOL/L (ref 22–29)
CREAT SERPL-MCNC: 1.3 MG/DL (ref 0.7–1.2)
EKG ATRIAL RATE: 87 BPM
EKG P AXIS: 57 DEGREES
EKG Q-T INTERVAL: 470 MS
EKG QRS DURATION: 190 MS
EKG QTC CALCULATION (BAZETT): 565 MS
EKG R AXIS: -64 DEGREES
EKG T AXIS: 95 DEGREES
EKG VENTRICULAR RATE: 87 BPM
EOSINOPHILS ABSOLUTE: 0.07 E9/L (ref 0.05–0.5)
EOSINOPHILS RELATIVE PERCENT: 0.5 % (ref 0–6)
GFR SERPL CREATININE-BSD FRML MDRD: 53 ML/MIN/1.73
GLUCOSE BLD-MCNC: 142 MG/DL (ref 74–99)
HBA1C MFR BLD: 5.7 % (ref 4–5.6)
HCT VFR BLD CALC: 29.2 % (ref 37–54)
HCT VFR BLD CALC: 33.1 % (ref 37–54)
HEMOGLOBIN: 8.9 G/DL (ref 12.5–16.5)
HEMOGLOBIN: 9.9 G/DL (ref 12.5–16.5)
IMMATURE GRANULOCYTES #: 0.06 E9/L
IMMATURE GRANULOCYTES %: 0.4 % (ref 0–5)
LYMPHOCYTES ABSOLUTE: 3.1 E9/L (ref 1.5–4)
LYMPHOCYTES RELATIVE PERCENT: 20.1 % (ref 20–42)
MCH RBC QN AUTO: 29.1 PG (ref 26–35)
MCHC RBC AUTO-ENTMCNC: 29.9 % (ref 32–34.5)
MCV RBC AUTO: 97.4 FL (ref 80–99.9)
MONOCYTES ABSOLUTE: 1.1 E9/L (ref 0.1–0.95)
MONOCYTES RELATIVE PERCENT: 7.1 % (ref 2–12)
NEUTROPHILS ABSOLUTE: 11.01 E9/L (ref 1.8–7.3)
NEUTROPHILS RELATIVE PERCENT: 71.6 % (ref 43–80)
PDW BLD-RTO: 12.8 FL (ref 11.5–15)
PLATELET # BLD: 379 E9/L (ref 130–450)
PMV BLD AUTO: 9.1 FL (ref 7–12)
POTASSIUM REFLEX MAGNESIUM: 4.2 MMOL/L (ref 3.5–5)
RBC # BLD: 3.4 E12/L (ref 3.8–5.8)
SODIUM BLD-SCNC: 137 MMOL/L (ref 132–146)
WBC # BLD: 15.4 E9/L (ref 4.5–11.5)

## 2022-11-22 PROCEDURE — 2709999900 HC NON-CHARGEABLE SUPPLY: Performed by: UROLOGY

## 2022-11-22 PROCEDURE — 99232 SBSQ HOSP IP/OBS MODERATE 35: CPT | Performed by: INTERNAL MEDICINE

## 2022-11-22 PROCEDURE — 0TCB8ZZ EXTIRPATION OF MATTER FROM BLADDER, VIA NATURAL OR ARTIFICIAL OPENING ENDOSCOPIC: ICD-10-PCS | Performed by: UROLOGY

## 2022-11-22 PROCEDURE — 86850 RBC ANTIBODY SCREEN: CPT

## 2022-11-22 PROCEDURE — 85014 HEMATOCRIT: CPT

## 2022-11-22 PROCEDURE — 86923 COMPATIBILITY TEST ELECTRIC: CPT

## 2022-11-22 PROCEDURE — 86901 BLOOD TYPING SEROLOGIC RH(D): CPT

## 2022-11-22 PROCEDURE — 80048 BASIC METABOLIC PNL TOTAL CA: CPT

## 2022-11-22 PROCEDURE — 36430 TRANSFUSION BLD/BLD COMPNT: CPT

## 2022-11-22 PROCEDURE — 6360000004 HC RX CONTRAST MEDICATION: Performed by: UROLOGY

## 2022-11-22 PROCEDURE — 2580000003 HC RX 258: Performed by: PHYSICIAN ASSISTANT

## 2022-11-22 PROCEDURE — P9016 RBC LEUKOCYTES REDUCED: HCPCS

## 2022-11-22 PROCEDURE — 0VT08ZZ RESECTION OF PROSTATE, VIA NATURAL OR ARTIFICIAL OPENING ENDOSCOPIC: ICD-10-PCS | Performed by: UROLOGY

## 2022-11-22 PROCEDURE — 93010 ELECTROCARDIOGRAM REPORT: CPT | Performed by: INTERNAL MEDICINE

## 2022-11-22 PROCEDURE — 7100000001 HC PACU RECOVERY - ADDTL 15 MIN: Performed by: UROLOGY

## 2022-11-22 PROCEDURE — 6370000000 HC RX 637 (ALT 250 FOR IP): Performed by: PHYSICIAN ASSISTANT

## 2022-11-22 PROCEDURE — 3600000012 HC SURGERY LEVEL 2 ADDTL 15MIN: Performed by: UROLOGY

## 2022-11-22 PROCEDURE — 2500000003 HC RX 250 WO HCPCS: Performed by: NURSE ANESTHETIST, CERTIFIED REGISTERED

## 2022-11-22 PROCEDURE — 2720000010 HC SURG SUPPLY STERILE: Performed by: UROLOGY

## 2022-11-22 PROCEDURE — 85025 COMPLETE CBC W/AUTO DIFF WBC: CPT

## 2022-11-22 PROCEDURE — 93005 ELECTROCARDIOGRAM TRACING: CPT | Performed by: INTERNAL MEDICINE

## 2022-11-22 PROCEDURE — 51700 IRRIGATION OF BLADDER: CPT

## 2022-11-22 PROCEDURE — 99223 1ST HOSP IP/OBS HIGH 75: CPT | Performed by: INTERNAL MEDICINE

## 2022-11-22 PROCEDURE — 36415 COLL VENOUS BLD VENIPUNCTURE: CPT

## 2022-11-22 PROCEDURE — 88305 TISSUE EXAM BY PATHOLOGIST: CPT

## 2022-11-22 PROCEDURE — 83036 HEMOGLOBIN GLYCOSYLATED A1C: CPT

## 2022-11-22 PROCEDURE — 3700000001 HC ADD 15 MINUTES (ANESTHESIA): Performed by: UROLOGY

## 2022-11-22 PROCEDURE — 6360000002 HC RX W HCPCS: Performed by: STUDENT IN AN ORGANIZED HEALTH CARE EDUCATION/TRAINING PROGRAM

## 2022-11-22 PROCEDURE — 3700000000 HC ANESTHESIA ATTENDED CARE: Performed by: UROLOGY

## 2022-11-22 PROCEDURE — 74420 UROGRAPHY RTRGR +-KUB: CPT

## 2022-11-22 PROCEDURE — 6360000002 HC RX W HCPCS: Performed by: UROLOGY

## 2022-11-22 PROCEDURE — 2580000003 HC RX 258: Performed by: STUDENT IN AN ORGANIZED HEALTH CARE EDUCATION/TRAINING PROGRAM

## 2022-11-22 PROCEDURE — 7100000000 HC PACU RECOVERY - FIRST 15 MIN: Performed by: UROLOGY

## 2022-11-22 PROCEDURE — 6360000002 HC RX W HCPCS: Performed by: NURSE ANESTHETIST, CERTIFIED REGISTERED

## 2022-11-22 PROCEDURE — 1200000000 HC SEMI PRIVATE

## 2022-11-22 PROCEDURE — 86900 BLOOD TYPING SEROLOGIC ABO: CPT

## 2022-11-22 PROCEDURE — 85018 HEMOGLOBIN: CPT

## 2022-11-22 PROCEDURE — C1758 CATHETER, URETERAL: HCPCS | Performed by: UROLOGY

## 2022-11-22 PROCEDURE — 3600000002 HC SURGERY LEVEL 2 BASE: Performed by: UROLOGY

## 2022-11-22 PROCEDURE — 2580000003 HC RX 258: Performed by: UROLOGY

## 2022-11-22 RX ORDER — PROPOFOL 10 MG/ML
INJECTION, EMULSION INTRAVENOUS CONTINUOUS PRN
Status: DISCONTINUED | OUTPATIENT
Start: 2022-11-22 | End: 2022-11-22 | Stop reason: SDUPTHER

## 2022-11-22 RX ORDER — MORPHINE SULFATE 2 MG/ML
2 INJECTION, SOLUTION INTRAMUSCULAR; INTRAVENOUS EVERY 5 MIN PRN
Status: DISCONTINUED | OUTPATIENT
Start: 2022-11-22 | End: 2022-11-22 | Stop reason: HOSPADM

## 2022-11-22 RX ORDER — MORPHINE SULFATE 2 MG/ML
1 INJECTION, SOLUTION INTRAMUSCULAR; INTRAVENOUS EVERY 5 MIN PRN
Status: DISCONTINUED | OUTPATIENT
Start: 2022-11-22 | End: 2022-11-22 | Stop reason: HOSPADM

## 2022-11-22 RX ORDER — LIDOCAINE HYDROCHLORIDE 20 MG/ML
INJECTION, SOLUTION EPIDURAL; INFILTRATION; INTRACAUDAL; PERINEURAL PRN
Status: DISCONTINUED | OUTPATIENT
Start: 2022-11-22 | End: 2022-11-22 | Stop reason: SDUPTHER

## 2022-11-22 RX ORDER — HYDROMORPHONE HCL 110MG/55ML
PATIENT CONTROLLED ANALGESIA SYRINGE INTRAVENOUS PRN
Status: DISCONTINUED | OUTPATIENT
Start: 2022-11-22 | End: 2022-11-22 | Stop reason: SDUPTHER

## 2022-11-22 RX ORDER — SODIUM CHLORIDE 0.9 % (FLUSH) 0.9 %
5-40 SYRINGE (ML) INJECTION PRN
Status: DISCONTINUED | OUTPATIENT
Start: 2022-11-22 | End: 2022-11-22 | Stop reason: HOSPADM

## 2022-11-22 RX ORDER — SODIUM CHLORIDE 0.9 % (FLUSH) 0.9 %
5-40 SYRINGE (ML) INJECTION EVERY 12 HOURS SCHEDULED
Status: DISCONTINUED | OUTPATIENT
Start: 2022-11-22 | End: 2022-11-22 | Stop reason: HOSPADM

## 2022-11-22 RX ORDER — MEPERIDINE HYDROCHLORIDE 25 MG/ML
12.5 INJECTION INTRAMUSCULAR; INTRAVENOUS; SUBCUTANEOUS EVERY 5 MIN PRN
Status: DISCONTINUED | OUTPATIENT
Start: 2022-11-22 | End: 2022-11-22 | Stop reason: HOSPADM

## 2022-11-22 RX ORDER — SODIUM CHLORIDE, SODIUM LACTATE, POTASSIUM CHLORIDE, CALCIUM CHLORIDE 600; 310; 30; 20 MG/100ML; MG/100ML; MG/100ML; MG/100ML
INJECTION, SOLUTION INTRAVENOUS CONTINUOUS
Status: DISCONTINUED | OUTPATIENT
Start: 2022-11-22 | End: 2022-11-24

## 2022-11-22 RX ORDER — SODIUM CHLORIDE 9 MG/ML
INJECTION, SOLUTION INTRAVENOUS PRN
Status: DISCONTINUED | OUTPATIENT
Start: 2022-11-22 | End: 2022-11-22 | Stop reason: HOSPADM

## 2022-11-22 RX ORDER — FENTANYL CITRATE 50 UG/ML
INJECTION, SOLUTION INTRAMUSCULAR; INTRAVENOUS PRN
Status: DISCONTINUED | OUTPATIENT
Start: 2022-11-22 | End: 2022-11-22 | Stop reason: SDUPTHER

## 2022-11-22 RX ORDER — SODIUM CHLORIDE 9 MG/ML
INJECTION, SOLUTION INTRAVENOUS PRN
Status: DISCONTINUED | OUTPATIENT
Start: 2022-11-22 | End: 2022-11-28 | Stop reason: HOSPADM

## 2022-11-22 RX ORDER — FUROSEMIDE 10 MG/ML
10 INJECTION INTRAMUSCULAR; INTRAVENOUS PRN
Status: COMPLETED | OUTPATIENT
Start: 2022-11-22 | End: 2022-11-23

## 2022-11-22 RX ORDER — PHENYLEPHRINE HCL IN 0.9% NACL 1 MG/10 ML
SYRINGE (ML) INTRAVENOUS PRN
Status: DISCONTINUED | OUTPATIENT
Start: 2022-11-22 | End: 2022-11-22 | Stop reason: SDUPTHER

## 2022-11-22 RX ADMIN — Medication 10 ML: at 20:26

## 2022-11-22 RX ADMIN — Medication 10 ML: at 09:00

## 2022-11-22 RX ADMIN — LISINOPRIL 10 MG: 10 TABLET ORAL at 09:39

## 2022-11-22 RX ADMIN — LIDOCAINE HYDROCHLORIDE 50 MG: 20 INJECTION, SOLUTION EPIDURAL; INFILTRATION; INTRACAUDAL; PERINEURAL at 12:32

## 2022-11-22 RX ADMIN — Medication 100 MCG: at 12:55

## 2022-11-22 RX ADMIN — PANTOPRAZOLE SODIUM 40 MG: 40 TABLET, DELAYED RELEASE ORAL at 05:19

## 2022-11-22 RX ADMIN — METOPROLOL TARTRATE 25 MG: 25 TABLET, FILM COATED ORAL at 09:38

## 2022-11-22 RX ADMIN — ACETAMINOPHEN 650 MG: 325 TABLET ORAL at 08:06

## 2022-11-22 RX ADMIN — PIPERACILLIN AND TAZOBACTAM 3375 MG: 3; .375 INJECTION, POWDER, FOR SOLUTION INTRAVENOUS at 16:17

## 2022-11-22 RX ADMIN — LISINOPRIL 10 MG: 10 TABLET ORAL at 20:26

## 2022-11-22 RX ADMIN — FENTANYL CITRATE 50 MCG: 50 INJECTION, SOLUTION INTRAMUSCULAR; INTRAVENOUS at 12:46

## 2022-11-22 RX ADMIN — SODIUM CHLORIDE, POTASSIUM CHLORIDE, SODIUM LACTATE AND CALCIUM CHLORIDE: 600; 310; 30; 20 INJECTION, SOLUTION INTRAVENOUS at 15:37

## 2022-11-22 RX ADMIN — FUROSEMIDE 10 MG: 10 INJECTION, SOLUTION INTRAVENOUS at 20:26

## 2022-11-22 RX ADMIN — Medication 100 MCG: at 12:58

## 2022-11-22 RX ADMIN — ATORVASTATIN CALCIUM 80 MG: 40 TABLET, FILM COATED ORAL at 20:26

## 2022-11-22 RX ADMIN — FENTANYL CITRATE 50 MCG: 50 INJECTION, SOLUTION INTRAMUSCULAR; INTRAVENOUS at 12:32

## 2022-11-22 RX ADMIN — TAMSULOSIN HYDROCHLORIDE 0.4 MG: 0.4 CAPSULE ORAL at 09:38

## 2022-11-22 RX ADMIN — HYDROMORPHONE HYDROCHLORIDE 1 MG: 2 INJECTION, SOLUTION INTRAMUSCULAR; INTRAVENOUS; SUBCUTANEOUS at 13:29

## 2022-11-22 RX ADMIN — ACETAMINOPHEN 650 MG: 325 TABLET ORAL at 22:33

## 2022-11-22 RX ADMIN — PROPOFOL 100 MCG/KG/MIN: 10 INJECTION, EMULSION INTRAVENOUS at 12:32

## 2022-11-22 ASSESSMENT — PAIN DESCRIPTION - LOCATION
LOCATION: ABDOMEN;BACK
LOCATION: PENIS
LOCATION: PENIS

## 2022-11-22 ASSESSMENT — PAIN SCALES - GENERAL
PAINLEVEL_OUTOF10: 6
PAINLEVEL_OUTOF10: 3
PAINLEVEL_OUTOF10: 0
PAINLEVEL_OUTOF10: 9

## 2022-11-22 ASSESSMENT — PAIN - FUNCTIONAL ASSESSMENT: PAIN_FUNCTIONAL_ASSESSMENT: PREVENTS OR INTERFERES SOME ACTIVE ACTIVITIES AND ADLS

## 2022-11-22 ASSESSMENT — PAIN DESCRIPTION - DESCRIPTORS: DESCRIPTORS: ACHING;DISCOMFORT;SPASM

## 2022-11-22 ASSESSMENT — PAIN DESCRIPTION - ONSET: ONSET: ON-GOING

## 2022-11-22 ASSESSMENT — PAIN DESCRIPTION - PAIN TYPE: TYPE: ACUTE PAIN

## 2022-11-22 ASSESSMENT — PAIN DESCRIPTION - FREQUENCY: FREQUENCY: CONTINUOUS

## 2022-11-22 ASSESSMENT — PAIN DESCRIPTION - ORIENTATION: ORIENTATION: RIGHT;MID

## 2022-11-22 NOTE — PROGRESS NOTES
Currie manually irrigated until light pink. One large clot returned. Pt. Tolerated well.     Electronically signed by César Hernández RN on 11/21/2022 at 7:11 PM

## 2022-11-22 NOTE — CONSULTS
[address] [place] [address] [facility]    Consultation Note     Admit Date: 11/21/2022 11:40 AM    Reason for Consult:   UTI    Attending Physician:  Bettina Cordova DO     Chief Complaint: urinary retention/bleeding    HISTORY OF PRESENT ILLNESS:   The patient is a 81 y.o.  male not known to the Infectious Diseases service. The patient has hx of bullous pemphigoid , afib with pacer in place, AVR , BPH presented with dysuria. For a week, he had increased frequency of urination. Patient also has hematuria spontaneously from anticoagulation use. No fever or cough or SOB. Has discomfort Lower abdomen. No pain. Or diarrhea. Since admission, pt is afebrile. HS stable. Saturating well on RA,. Labs showed normal white count yesterday, today is 15, hgb if 8.9, normal platelet count. Cr is 1.3/BUN 18. LFTs are ebenezer. UA showed pyuria. Urine cx in process. Ct A/p showed   Currie catheter in the bladder with decompressed bladder demonstrating   irregularly thickened wall, suspicious for underlying malignancy. 1.8 cm   cystic structure in in the posterolateral region of the bladder, suspicious   for bladder diverticulum or ureterocele. Urologic consultation and/or   cystoscopy evaluation recommended. Enlarged prostate gland. Multiple bilateral renal cysts. Constipation.    Patient is on ceftriaxone and I got consulted for further recommendations/     Past Medical History:        Diagnosis Date    Aortic valve replaced 2015    Cancer ([address]     skin - removed     Diabetes mellitus ([address]     Elevated cholesterol     Hypertension     Pacemaker 2016    Prostate enlargement     Pulmonary emboli ([address] 2011    S/P CABG x 1 2015    TIA (transient ischemic attack) 2016     Past Surgical History:        Procedure Laterality Date    CARDIAC SURGERY  07/06/2015    AORTIV VALVE REPLACEMENT & BYPASS    PACEMAKER PLACEMENT       Current Medications:   Scheduled Meds:   furosemide  10 mg IntraVENous See Admin Instructions    piperacillin-tazobactam  3,375 mg IntraVENous Q8H    atorvastatin  80 mg Oral Nightly    finasteride  5 mg Oral Daily    folic acid  9,072 mcg Oral Daily    lisinopril  10 mg Oral BID    magnesium oxide  400 mg Oral Daily    metoprolol tartrate  25 mg Oral BID    pantoprazole  40 mg Oral QAM AC    tamsulosin  0.4 mg Oral Daily    sodium chloride flush  5-40 mL IntraVENous 2 times per day     Continuous Infusions:   lactated ringers      sodium chloride      sodium chloride      dextrose       PRN Meds:sodium chloride, sodium chloride flush, sodium chloride, ondansetron **OR** ondansetron, polyethylene glycol, acetaminophen **OR** acetaminophen, glucose, dextrose bolus **OR** dextrose bolus, glucagon (rDNA), dextrose    Allergies:  Patient has no known allergies. Social History:   Social History     Socioeconomic History    Marital status:    Tobacco Use    Smoking status: Former    Smokeless tobacco: Former   Substance and Sexual Activity    Alcohol use: Yes     Comment: RARELY     Drug use: No       Family History:   No family history on file. . Otherwise non-pertinent to the chief complaint. REVIEW OF SYSTEMS:    As mentioned in HPI, all other systems negative. PHYSICAL EXAM:    Vitals:    BP (!) 113/51   Pulse 97   Temp 97.7 °F (36.5 °C) (Temporal)   Resp 20   Ht 5' 6\" (1.676 m)   Wt 173 lb (78.5 kg)   SpO2 96%   BMI 27.92 kg/m²   Constitutional: The patient is awake, alert, and oriented. Skin: Warm and dry. No rashes were noted. No jaundice. HEENT: Eyes show round, and reactive pupils. Moist mucous membranes, no ulcerations, no thrush. Neck: Supple to movements. No lymphadenopathy. Chest: No use of accessory muscles to breathe. Symmetrical expansion. Auscultation reveals no wheezing, crackles, or rhonchi. Cardiovascular: S1 and S2 are rhythmic and regular. No murmurs appreciated. Pacer in place  Abdomen: soft.  Non tender  Currie catheter with pink urine Extremities: No clubbing, no cyanosis, no edema.   Musculoskeletal: no gross focal abnormalities  Neurological: alert, oriented x 3  Lines: peripheral      CBC+dif:  Recent Labs     11/21/22  1204 11/22/22  0345 11/22/22  1438   WBC 11.1 15.4*  --    HGB 10.4* 9.9* 8.9*   HCT 35.9* 33.1* 29.2*   MCV 97.0 97.4  --     379  --    NEUTROABS 6.83 11.01*  --      No results found for: CRP  No results found for: CRPHS  No results found for: SEDRATE  Lab Results   Component Value Date    ALT 21 11/21/2022    AST 26 11/21/2022    ALKPHOS 94 11/21/2022    BILITOT 0.3 11/21/2022     Lab Results   Component Value Date/Time     11/22/2022 03:45 AM    K 4.2 11/22/2022 03:45 AM     11/22/2022 03:45 AM    CO2 21 11/22/2022 03:45 AM    BUN 18 11/22/2022 03:45 AM    CREATININE 1.3 11/22/2022 03:45 AM    GFRAA >60 04/22/2019 10:56 AM    LABGLOM 53 11/22/2022 03:45 AM    GLUCOSE 142 11/22/2022 03:45 AM    PROT 6.5 11/21/2022 12:04 PM    LABALBU 3.8 11/21/2022 12:04 PM    CALCIUM 9.0 11/22/2022 03:45 AM    BILITOT 0.3 11/21/2022 12:04 PM    ALKPHOS 94 11/21/2022 12:04 PM    AST 26 11/21/2022 12:04 PM    ALT 21 11/21/2022 12:04 PM       Lab Results   Component Value Date/Time    PROTIME 12.0 10/20/2018 08:00 AM    INR 1.1 10/20/2018 08:00 AM       No results found for: TSH    Lab Results   Component Value Date/Time    COLORU Yellow 11/21/2022 12:04 PM    PHUR 6.0 11/21/2022 12:04 PM    WBCUA >20 11/21/2022 12:04 PM    RBCUA 1-3 11/21/2022 12:04 PM    BACTERIA MANY 11/21/2022 12:04 PM    CLARITYU Clear 11/21/2022 12:04 PM    SPECGRAV 1.015 11/21/2022 12:04 PM    LEUKOCYTESUR LARGE 11/21/2022 12:04 PM    UROBILINOGEN 0.2 11/21/2022 12:04 PM    BILIRUBINUR Negative 11/21/2022 12:04 PM    BLOODU MODERATE 11/21/2022 12:04 PM    GLUCOSEU Negative 11/21/2022 12:04 PM       No results found for: BMY8PSE, BEART, F8XIJNGI, PHART, THGBART, ACF4JHL, PO2ART, HFK0ACK  Radiology:  CT ABDOMEN PELVIS W IV CONTRAST Additional Contrast? None   Final Result   Currie catheter in the bladder with decompressed bladder demonstrating   irregularly thickened wall, suspicious for underlying malignancy. 1.8 cm   cystic structure in in the posterolateral region of the bladder, suspicious   for bladder diverticulum or ureterocele. Urologic consultation and/or   cystoscopy evaluation recommended. Enlarged prostate gland. Multiple bilateral renal cysts. Constipation. FL RETROGRADE PYELOGRAM W WO KUB    (Results Pending)       Microbiology:  Pending  No results for input(s): BC in the last 72 hours. No results for input(s): ORG in the last 72 hours. No results for input(s): Dmitry Stade in the last 72 hours. No results for input(s): STREPNEUMAGU in the last 72 hours. No results for input(s): LP1UAG in the last 72 hours. No results for input(s): ASO in the last 72 hours. No results for input(s): CULTRESP in the last 72 hours. Assessment:  Complicated UTI:   Spontaneous hematuria from anticoagulation use: s/p cystoscopy with clot evacuation today. leucocytosis    Plan:    Switched ceftriaxone to IV Zosyn given his prior cx had enterococcus fecalis. Monitor labs  Will follow with you    Thank you for having us see this patient in consultation. I will be discussing this case with the treating physicians.       Electronically signed by Millicent Stewart MD on 11/22/2022 at 3:23 PM

## 2022-11-22 NOTE — PROGRESS NOTES
Manually irrigated moran until pink. No clots, patient tolerated well.     Electronically signed by Christopher Underwood RN on 11/21/2022 at 11:35 PM

## 2022-11-22 NOTE — ANESTHESIA POSTPROCEDURE EVALUATION
Department of Anesthesiology  Postprocedure Note    Patient: Jorge A Manley  MRN: [id number]  YOB: 1935  Date of evaluation: 11/22/2022      Procedure Summary     Date: 11/22/22 Room / Location: [facility] [place] 06 / [facility]    Anesthesia Start: 1227 Anesthesia Stop: 6697    Procedure: CYSTOSCOPY, EVACUATION OF CLOTS AND FULGURATION Diagnosis:       Clot      (Clot [I82.90])    Surgeons: Lenard Winter MD Responsible Provider: Rell John MD    Anesthesia Type: MAC ASA Status: 4          Anesthesia Type: MAC    Cristy Phase I: Cristy Score: 10    Cristy Phase II:        Anesthesia Post Evaluation    Patient location during evaluation: PACU  Patient participation: complete - patient cannot participate  Level of consciousness: lethargic  Pain score: 3  Airway patency: patent  Nausea & Vomiting: no vomiting and no nausea  Complications: no  Cardiovascular status: blood pressure returned to baseline  Respiratory status: acceptable  Hydration status: stable

## 2022-11-22 NOTE — CARE COORDINATION
Introduced my self and provided explanation of CM role to patient and his wife at bedside  Patient is awake, alert, and aware of current diagnosis and treatment plan including continuous bladder irrigation, OR procedure today with urology. He voices he resides at home with his spouse and completes his adl's with independence. He adds he uses a cane at times. Patient is established with a pcp and denies any issue with retail pharmaceutical coverage. He plans on a discharge to home. Patient verbalizes no concerns and identifies no areas to focus on nor barriers to discharge. He will transport via private car per wife. Explained ELOS of 24-48 hours; patient voiced understanding and agreement. Will follow along with  and assist with discharge planning as necessary. Taylor Monroy.  Gab, JULIA RN  [facility] Case Management  [phone]

## 2022-11-22 NOTE — PATIENT CARE CONFERENCE
P Quality Flow/Interdisciplinary Rounds Progress Note        Quality Flow Rounds held on November 22, 2022    Disciplines Attending:  Bedside Nurse, , , and Nursing Unit Leadership    Harley Sam was admitted on 11/21/2022 11:40 AM    Anticipated Discharge Date:       Disposition:    Junior Score:  Junior Scale Score: 19    Readmission Risk              Risk of Unplanned Readmission:  13           Discussed patient goal for the day, patient clinical progression, and barriers to discharge.   The following Goal(s) of the Day/Commitment(s) have been identified:  Diagnostics - Report Results      Yanira Reynaga RN  November 22, 2022

## 2022-11-22 NOTE — BRIEF OP NOTE
Brief Postoperative Note      Patient: Fili Vázquez  YOB: 1935  MRN: [id number]    Date of Procedure: 11/22/2022    Pre-Op Diagnosis: Clot retention    Post-Op Diagnosis: Same  bleeding from obstructing prostate       Op  cysto evacuation of clots tup    Surgeon(s):  Jose An MD    Assistant:      Anesthesia: General    Estimated Blood Loss (mL): less than 406     Complications: None    Specimens:   ID Type Source Tests Collected by Time Destination   A : PROSTATE TISSUE Tissue Tissue SURGICAL PATHOLOGY Jose An MD 11/22/2022 1304        Implants:        Drains:   Urinary Catheter 11/22/22 3 Way (Active)       Findings:     Electronically signed by Jose An MD on 11/22/2022 at 1:55 PM

## 2022-11-22 NOTE — PROGRESS NOTES
Irrigated at this time, multiple large clots evacuated at this time.  Electronically signed by Miguel Gonzalez RN on 11/22/2022 at 9:32 AM

## 2022-11-22 NOTE — ANESTHESIA POSTPROCEDURE EVALUATION
Department of Anesthesiology  Postprocedure Note    Patient: Jorge A Manley  MRN: [id number]  YOB: 1935  Date of evaluation: 11/22/2022      Procedure Summary     Date: 11/22/22 Room / Location: [facility] OR 06 / [facility]    Anesthesia Start:  Anesthesia Stop:     Procedure: CYSTOSCOPY, EVACUATION OF CLOTS AND FULGURATION Diagnosis:       Clot      (Clot [I82.90])    Surgeons: Lenard Winter MD Responsible Provider:     Anesthesia Type: MAC ASA Status: 4          Anesthesia Type: No value filed.     Cristy Phase I: Cristy Score: 10    Cristy Phase II:        Anesthesia Post Evaluation    Patient location during evaluation: PACU  Patient participation: complete - patient participated  Level of consciousness: awake  Pain score: 3  Airway patency: patent  Nausea & Vomiting: no nausea and no vomiting  Complications: no  Cardiovascular status: blood pressure returned to baseline  Respiratory status: acceptable  Hydration status: euvolemic

## 2022-11-22 NOTE — PROGRESS NOTES
1000 CBI bag was not flowing when RN went to room. Patient in pain. Irrigated again removed several large clots. Tolerated well. CBI running again wide open. Will continue frequent checks by RN.  Electronically signed by Yen Araiza RN on 11/22/2022 at 10:18 AM

## 2022-11-22 NOTE — CONSULTS
INPATIENT CARDIOLOGY CONSULT    Name: Giancarlo Hanson    Age: 81 y.o. Date of Admission: 11/21/2022 11:40 AM    Date of Service: 11/22/2022    Reason for Consultation: Gross hematuria, coronary atherosclerosis, aortic valve disease, atrioventricular block, paroxysmal atrial fibrillation/flutter, chronic kidney disease, remote pulmonary embolism    Referring Physician: Isak Martin DO    History of Present Illness: The patient is an 41-year-old white male known to [facility] exclusively from inpatient evaluation and primary care provided by myself with the predominance of his care provided at the [address] [place] [address]. 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 He additionally has a history of hypertension, diabetes, hyperlipidemia, a prior transient cerebral ischemic episode, bullous pemphigoid and benign prostatic hypertrophy. He subsequently presented to [facility] in November, 2016 with chest discomfort and complete heart block with coronary angiography demonstrating patency of his saphenous vein graft and placement of a temporary pacemaker with transfer to the [address] [place] [address] where he underwent permanent pacemaker insertion. His local evaluations were predominantly prompted by noncardiovascular chest discomfort with most recent local assessment in April, 2019.   Subsequent care at the [address] [place] [address] has noted the development of recurrent atrial arrhythmias with multiple cardioversions and antiarrhythmic therapy with dofetilide subsequently discontinued due to progressive renal insufficiency and with his most recent cardioversion in September, 2022 with a subsequent documented recurrence of his atrial arrhythmias. His most recent objective assessment included echocardiogram of October, 2022 at the [facility] demonstrated evidence of a normal-sized left ventricular chamber with moderate concentric left ventricular hypertrophy and normal left ventricular systolic function with right ventricular dilatation and biatrial enlargement including that of severe left atrial enlargement with mild mitral stenosis and appropriate function of his aortic prosthesis (mean transaortic gradient 12 mmHg). Pacemaker interrogation of November, 2022 since his most recent clinical assessment demonstrates evidence of sinus rhythm with pacemaker dependence and no atrial arrhythmias with a significant reduction of his arrhythmia burden from his most recent outpatient assessment (approximately 30%). At the time of his most recent evaluation within the past week, he related symptomatology of fatigue in the absence of additional decompensation with recommended therapy with amiodarone in attempt to maintain sinus rhythm, but this was not tolerated due to adverse gastrointestinal symptoms. Within the past week he has noted increasing urinary frequency and dysuria with assessment by the urology service and recommendation of intermittent self-catheterization which has not been required within the past week but within the past week he is noted the development of hematuria with associated clots in addition to that of anorexia and progressive debilitation. On this basis, he presented to the emergency room where a urinalysis suggested the presence of a potential urinary tract infection in addition to that of gross hematuria presently with the insertion of a urinary catheter with associated continuous irrigation.   Laboratory studies demonstrated anemia upon hospitalization with a subsequent additional decrease of hemoglobin levels and stable stage IIIb chronic kidney disease. At the time of evaluation, he denies active cardiovascular symptomatology with a resting electrocardiogram reviewed at time of evaluation documenting sinus rhythm with supraventricular ectopy and appropriate function of his permanent pacemaker with symptoms limited to that of bladder spasm. Review of Systems: The remainder of a complete multisystem review including consitutional, central nervous, respiratory, circulatory, gastrointestinal, genitourinary, endocrinologic, hematologic, musculoskeletal and psychiatric are negative. Past Medical History:  Past Medical History:   Diagnosis Date    Aortic valve replaced 2015    Cancer ([facility]     skin - removed     Diabetes mellitus ([facility]     Elevated cholesterol     Hypertension     Pacemaker 2016    Prostate enlargement     Pulmonary emboli ([facility] 2011    S/P CABG x 1 2015    TIA (transient ischemic attack) 2016       Past Surgical History:  Past Surgical History:   Procedure Laterality Date    CARDIAC SURGERY  07/06/2015    AORTIV VALVE REPLACEMENT & BYPASS    PACEMAKER PLACEMENT         Family History:  No family history on file.     Social History:  Social History     Socioeconomic History    Marital status:      Spouse name: Not on file    Number of children: Not on file    Years of education: Not on file    Highest education level: Not on file   Occupational History    Not on file   Tobacco Use    Smoking status: Former    Smokeless tobacco: Former   Substance and Sexual Activity    Alcohol use: Yes     Comment: RARELY     Drug use: No    Sexual activity: Not on file   Other Topics Concern    Not on file   Social History Narrative    Not on file     Social Determinants of Health     Financial Resource Strain: Not on file   Food Insecurity: Not on file   Transportation Needs: Not on file   Physical Activity: Not on file   Stress: Not on file   Social Connections: Not on file   Intimate Partner Violence: Not on file   Housing Stability: Not on file       Allergies:  No Known Allergies    Home Medications:  Prior to Admission medications    Medication Sig Start Date End Date Taking? Authorizing Provider   apixaban (ELIQUIS) 5 MG TABS tablet Take by mouth 2 times daily   Yes Historical Provider, MD   Clobetasol Propionate 0.025 % CREA Apply topically as needed    Historical Provider, MD   nitroGLYCERIN (NITROSTAT) 0.4 MG SL tablet up to max of 3 total doses. If no relief after 1 dose, call 911. 4/23/19   Casandra Dyson MD   lisinopril (PRINIVIL;ZESTRIL) 10 MG tablet Take 10 mg by mouth 2 times daily    Historical Provider, MD   magnesium oxide (MAG-OX) 400 MG tablet Take 400 mg by mouth 2 times daily    Historical Provider, MD   atorvastatin (LIPITOR) 80 MG tablet Take 80 mg by mouth nightly    Historical Provider, MD   mycophenolate (CELLCEPT) 250 MG capsule Take by mouth 2 times daily Wife states he takes 6 capsules ( 1500mg ) in the morning and 6 capsules at night.     Historical Provider, MD   folic acid (FOLVITE) 494 MCG tablet Take 800 mcg by mouth daily    Historical Provider, MD   clopidogrel (PLAVIX) 75 MG tablet Take 75 mg by mouth daily    Historical Provider, MD   vitamin D-3 (CHOLECALCIFEROL) 5000 UNITS TABS Take 5,000 Units by mouth daily    Historical Provider, MD   metoprolol (LOPRESSOR) 25 MG tablet Take 12.5 mg by mouth daily    Historical Provider, MD   omeprazole (PRILOSEC) 20 MG capsule Take 20 mg by mouth Daily    Historical Provider, MD   tamsulosin (FLOMAX) 0.4 MG capsule Take 0.4 mg by mouth daily    Historical Provider, MD       Current Medications:  Current Facility-Administered Medications   Medication Dose Route Frequency Provider Last Rate Last Admin    atorvastatin (LIPITOR) tablet 80 mg  80 mg Oral Nightly Amal Kd Bahena PA-C   80 mg at 11/21/22 2023    finasteride (PROSCAR) tablet 5 mg  5 mg Oral Daily Amal Kd Bahena PA-C        folic acid (FOLVITE) tablet 1,000 mcg  1,000 mcg Oral Daily Pollo Bahena PA-C        lisinopril (PRINIVIL;ZESTRIL) tablet 10 mg  10 mg Oral BID Gama Bahena PA-C   10 mg at 11/21/22 2023    magnesium oxide (MAG-OX) tablet 400 mg  400 mg Oral Daily Pollo Bahena PA-C        metoprolol tartrate (LOPRESSOR) tablet 25 mg  25 mg Oral BID Pollo Bahena PA-C        pantoprazole (PROTONIX) tablet 40 mg  40 mg Oral QAM AC Pollo Bahena PA-C   40 mg at 11/22/22 0519    tamsulosin (FLOMAX) capsule 0.4 mg  0.4 mg Oral Daily Pollo Bahena PA-C        sodium chloride flush 0.9 % injection 5-40 mL  5-40 mL IntraVENous 2 times per day Gama Bahena PA-C   10 mL at 11/21/22 2023    sodium chloride flush 0.9 % injection 5-40 mL  5-40 mL IntraVENous PRN Pollo Bahena PA-C        0.9 % sodium chloride infusion   IntraVENous PRN Pollo Bahena PA-C        ondansetron (ZOFRAN-ODT) disintegrating tablet 4 mg  4 mg Oral Q8H PRN Pollo Bahena PA-C        Or    [facility] injection 4 mg  4 mg IntraVENous Q6H PRN Pollo Bahena PA-C        polyethylene glycol (GLYCOLAX) packet 17 g  17 g Oral Daily PRN Pollo Bahena PA-C        acetaminophen (TYLENOL) tablet 650 mg  650 mg Oral Q6H PRN Gama Bahena PA-C   650 mg at 11/22/22 4538    Or    acetaminophen (TYLENOL) suppository 650 mg  650 mg Rectal Q6H PRN Pollo Bahena PA-C        cefTRIAXone (ROCEPHIN) 1,000 mg in sterile water 10 mL IV syringe  1,000 mg IntraVENous Q24H Pollo Bahena PA-C        glucose chewable tablet 16 g  4 tablet Oral PRN Pollo Bahena PA-C        dextrose bolus 10% 125 mL  125 mL IntraVENous PRN Gama Bahena PA-C        Or    dextrose bolus 10% 250 mL  250 mL IntraVENous PRN Pollo Bahena PA-C        glucagon (rDNA) injection 1 mg  1 mg SubCUTAneous PRN Pollo Bahena PA-C        dextrose 10 % infusion   IntraVENous Continuous PRN Pollo Bahena PA-C          sodium chloride      dextrose           Physical Exam:  /61   Pulse 84 Temp 98.2 °F (36.8 °C) (Oral)   Resp 18   Ht 5' 6\" (1.676 m)   Wt 173 lb (78.5 kg)   SpO2 95%   BMI 27.92 kg/m²   Weight change: Wt Readings from Last 3 Encounters:   11/22/22 173 lb (78.5 kg)   05/12/22 180 lb (81.6 kg)   04/22/19 180 lb (81.6 kg)     The patient is awake, alert and in no discomfort or distress. No gross musculoskeletal deformity or lymphadenopathy are present. No significant skin or nail changes are present. Gross examination of head, eyes, nose and throat are negative. Jugular venous pressure is normal and no carotid bruits are present. No thyromegaly is noted. Normal respiratory effort is noted with no accessory muscle usage present. Lung fields are clear to ascultation. Cardiac examination is notable for a regular rate and rhythm with no palpable thrill. No gallop rhythm or cardiac murmur are identified. A benign abdominal examination is present with no masses or organomegaly. A normal abdominal aortic pulsation is present. Intact pulses are present throughout all extremities and no peripheral edema is present. No focal neurologic deficits are present. Intake/Output:    Intake/Output Summary (Last 24 hours) at 11/22/2022 0906  Last data filed at 11/22/2022 0801  Gross per 24 hour   Intake --   Output 5225 ml   Net -5225 ml     I/O this shift:  In: -   Out: 900 [Urine:900]    Laboratory Tests:  Lab Results   Component Value Date    CREATININE 1.3 (H) 11/22/2022    BUN 18 11/22/2022     11/22/2022    K 4.2 11/22/2022     11/22/2022    CO2 21 (L) 11/22/2022     No results for input(s): CKTOTAL, CKMB in the last 72 hours.     Invalid input(s): TROPONONI  No results found for: BNP  Lab Results   Component Value Date/Time    WBC 15.4 11/22/2022 03:45 AM    RBC 3.40 11/22/2022 03:45 AM    HGB 9.9 11/22/2022 03:45 AM    HCT 33.1 11/22/2022 03:45 AM    MCV 97.4 11/22/2022 03:45 AM    MCH 29.1 11/22/2022 03:45 AM    MCHC 29.9 11/22/2022 03:45 AM    RDW 12.8 11/22/2022 03:45 AM    PLT 379 11/22/2022 03:45 AM    MPV 9.1 11/22/2022 03:45 AM     Recent Labs     11/21/22  1204   ALKPHOS 94   ALT 21   AST 26   PROT 6.5   BILITOT 0.3   LABALBU 3.8     No results found for: MG  Lab Results   Component Value Date/Time    PROTIME 12.0 10/20/2018 08:00 AM    INR 1.1 10/20/2018 08:00 AM     No results found for: TSH  No components found for: CHLPL  No results found for: TRIG  No results found for: HDL  No results found for: [address]      Cardiac Tests:  ECG: A resting electrocardiogram reviewed time evaluation demonstrates evidence of sinus rhythm with supraventricular ectopy and appropriate function of a dual-chamber pacemaker  Last Echocardiogram: An echocardiogram of October, 2022 at the [address] [place] [address] demonstrated evidence of a normal-sized left ventricular chamber with moderate concentric left ventricular hypertrophy and normal left ventricular systolic function with right ventricular dilatation and normal right ventricular systolic function. Severe left atrial enlargement is present with biatrial enlargement with mild mitral stenosis (mean transmitral gradient 4 mmHg) and appropriate function of a bioprosthetic aortic prosthesis with a mean gradient of 12 mmHg      ASSESSMENT / PLAN: On a clinical basis, the patient presently appears compensated from a cardiovascular standpoint with present documentation of sinus rhythm in the face of presentation with a potential urinary tract infection and gross hematuria. On his basis, the risk of bleeding exceeds that of his embolic risk with needs of a temporary interruption of his anticoagulation in addition to that of antiplatelet therapy in the image regularly of care presently deferred to the urology service. Continue careful monitoring of his volume status will be necessary in the face of bladder irrigation in addition to that of monitoring of hemoglobin levels.   If additional urologic assessment is indicated, he would be an acceptable candidate from a cardiovascular standpoint with continued needs of appropriate monitoring of his volume status. Is the continuation of additional components of his medical regimen remain advisable with need of careful monitoring of his renal function, particular in the face of his angiotensin-converting enzyme inhibitor. On long-term basis, ongoing aggressive risk factor modification of blood pressure and serum lipids will remain essential to reducing risk of future atherosclerotic development in addition to that of antibiotic prophylaxis at time of all dental interventions to reduce risk of bacterial endocarditis. Thank you for allowing me to participate in your patient's care. Please feel free to contact me if you have any questions or concerns. Note: This report was completed using computerized voice recognition software. Every effort has been made to ensure accuracy, however; inadvertent computerized transcription errors may be present. Edmar Thomas.  Mei Hussein, [address] [facility]

## 2022-11-22 NOTE — PROGRESS NOTES
Spoke with lab - they have urine for culture and cytology, they just need a requisition - sent at this time.     Electronically signed by Chalo Sprague RN on 11/21/2022 at 7:07 PM

## 2022-11-22 NOTE — PROGRESS NOTES
Manually irrigated moran multiple times, many large clots came out. Bag would not drain so new bag and tubing was placed. Patient was in a lot of pain while irrigating. Moran is pink now.     Electronically signed by Lubna Ceja RN on 11/21/2022 at 11:38 PM

## 2022-11-22 NOTE — ANESTHESIA PRE PROCEDURE
Department of Anesthesiology  Preprocedure Note       Name:  Sonya Brewer   Age:  81 y.o.  :  1935                                          MRN:  [id number]         Date:  2022      Surgeon: José Manuel Cornell):  Mihir Moncada MD    Procedure: Procedure(s):  CYSTOSCOPY, EVACUATION OF CLOTS AND FULGURATION    Medications prior to admission:   Prior to Admission medications    Medication Sig Start Date End Date Taking? Authorizing Provider   apixaban (ELIQUIS) 5 MG TABS tablet Take by mouth 2 times daily   Yes Historical Provider, MD   Clobetasol Propionate 0.025 % CREA Apply topically as needed    Historical Provider, MD   nitroGLYCERIN (NITROSTAT) 0.4 MG SL tablet up to max of 3 total doses. If no relief after 1 dose, call 911. 19   Frank Faith MD   lisinopril (PRINIVIL;ZESTRIL) 10 MG tablet Take 10 mg by mouth 2 times daily    Historical Provider, MD   magnesium oxide (MAG-OX) 400 MG tablet Take 400 mg by mouth 2 times daily    Historical Provider, MD   atorvastatin (LIPITOR) 80 MG tablet Take 80 mg by mouth nightly    Historical Provider, MD   mycophenolate (CELLCEPT) 250 MG capsule Take by mouth 2 times daily Wife states he takes 6 capsules ( 1500mg ) in the morning and 6 capsules at night.     Historical Provider, MD   folic acid (FOLVITE) 773 MCG tablet Take 800 mcg by mouth daily    Historical Provider, MD   clopidogrel (PLAVIX) 75 MG tablet Take 75 mg by mouth daily    Historical Provider, MD   vitamin D-3 (CHOLECALCIFEROL) 5000 UNITS TABS Take 5,000 Units by mouth daily    Historical Provider, MD   metoprolol (LOPRESSOR) 25 MG tablet Take 12.5 mg by mouth daily    Historical Provider, MD   omeprazole (PRILOSEC) 20 MG capsule Take 20 mg by mouth Daily    Historical Provider, MD   tamsulosin (FLOMAX) 0.4 MG capsule Take 0.4 mg by mouth daily    Historical Provider, MD       Current medications:    Current Facility-Administered Medications   Medication Dose Route Frequency Provider Last Rate Last Admin    atorvastatin (LIPITOR) tablet 80 mg  80 mg Oral Nightly Jose Alejandro Bahena PA-C   80 mg at 11/21/22 2023    finasteride (PROSCAR) tablet 5 mg  5 mg Oral Daily Pollo Bahena PA-C        folic acid (FOLVITE) tablet 1,000 mcg  1,000 mcg Oral Daily Pollo Bahena PA-C        lisinopril (PRINIVIL;ZESTRIL) tablet 10 mg  10 mg Oral BID Jose Alejandro Bahena PA-C   10 mg at 11/22/22 5775    magnesium oxide (MAG-OX) tablet 400 mg  400 mg Oral Daily Pollo Bahena PA-C        metoprolol tartrate (LOPRESSOR) tablet 25 mg  25 mg Oral BID Jose Alejandro Bahena PA-C   25 mg at 11/22/22 3919    pantoprazole (PROTONIX) tablet 40 mg  40 mg Oral QAM AC Pollo Bahena PA-C   40 mg at 11/22/22 0519    tamsulosin (FLOMAX) capsule 0.4 mg  0.4 mg Oral Daily Pollo Bahena PA-C   0.4 mg at 11/22/22 8389    sodium chloride flush 0.9 % injection 5-40 mL  5-40 mL IntraVENous 2 times per day Jose Alejandro Bahena PA-C   10 mL at 11/21/22 2023    sodium chloride flush 0.9 % injection 5-40 mL  5-40 mL IntraVENous PRN Jose Alejandro Bahena PA-C        0.9 % sodium chloride infusion   IntraVENous PRN Jose Alejandro Bahena PA-C        ondansetron (ZOFRAN-ODT) disintegrating tablet 4 mg  4 mg Oral Q8H PRN Pollo Bahena PA-C        Or    [facility]ron [facility] injection 4 mg  4 mg IntraVENous Q6H PRN Pollo Bahena PA-C        polyethylene glycol (GLYCOLAX) packet 17 g  17 g Oral Daily PRN Pollo Bahena PA-C        acetaminophen (TYLENOL) tablet 650 mg  650 mg Oral Q6H PRN Jose Alejandro Bahena PA-C   650 mg at 11/22/22 5482    Or    acetaminophen (TYLENOL) suppository 650 mg  650 mg Rectal Q6H PRN Jose Alejandro Bahena PA-C        cefTRIAXone (ROCEPHIN) 1,000 mg in sterile water 10 mL IV syringe  1,000 mg IntraVENous Q24H Pollo Bahena PA-C        glucose chewable tablet 16 g  4 tablet Oral PRN Jose Alejandro Bahena PA-C        dextrose bolus 10% 125 mL  125 mL IntraVENous PRN Pollo Roche PA-C        Or   Marcello Campos dextrose bolus 10% 250 mL  250 mL IntraVENous PRN Marylu Bahena PA-C        glucagon (rDNA) injection 1 mg  1 mg SubCUTAneous PRN Pollo Bahena PA-C        dextrose 10 % infusion   IntraVENous Continuous PRN Pollo Bahena PA-C           Allergies:  No Known Allergies    Problem List:    Patient Active Problem List   Diagnosis Code    Mobitz II I44.1    Bradycardia R00.1    Chest pain R07.9    Coronary artery disease involving native coronary artery of native heart without angina pectoris I25.10    Complete heart block (HCC) I44.2    Aortic valve disease I35.9    Type 2 diabetes mellitus without complication, without long-term current use of insulin (HCC) E11.9    Pure hypercholesterolemia E78.00    Essential hypertension I10    Near syncope R55    Gross hematuria R31.0    Urinary tract infection with hematuria N39.0, R31.9    Anemia associated with acute blood loss D62    Bullous pemphigoid L12.0    Acidosis E87.20       Past Medical History:        Diagnosis Date    Aortic valve replaced 2015    Cancer ([facility] [address]     skin - removed     Diabetes mellitus ([facility] [address]     Elevated cholesterol     Hypertension     Pacemaker 2016    Prostate enlargement     Pulmonary emboli ([facility] [address] 2011    S/P CABG x 1 2015    TIA (transient ischemic attack) 2016       Past Surgical History:        Procedure Laterality Date    CARDIAC SURGERY  07/06/2015    AORTIV VALVE REPLACEMENT & BYPASS    PACEMAKER PLACEMENT         Social History:    Social History     Tobacco Use    Smoking status: Former    Smokeless tobacco: Former   Substance Use Topics    Alcohol use: Yes     Comment: RARELY                                 Counseling given: Not Answered      Vital Signs (Current):   Vitals:    11/21/22 2019 11/21/22 2250 11/22/22 0009 11/22/22 0717   BP: 130/60   135/61   Pulse: 88   84   Resp: 18 18  18   Temp:    98.2 °F (36.8 °C)   TempSrc:    Oral   SpO2: 95%   95%   Weight:   173 lb (78.5 kg)    Height: BP Readings from Last 3 Encounters:   11/22/22 135/61   05/12/22 136/78   04/23/19 130/70       NPO Status: Time of last liquid consumption: 0930 (sip with med)                        Time of last solid consumption: 1900                        Date of last liquid consumption: 11/22/22                        Date of last solid food consumption: 11/21/22    BMI:   Wt Readings from Last 3 Encounters:   11/22/22 173 lb (78.5 kg)   05/12/22 180 lb (81.6 kg)   04/22/19 180 lb (81.6 kg)     Body mass index is 27.92 kg/m². CBC:   Lab Results   Component Value Date/Time    WBC 15.4 11/22/2022 03:45 AM    RBC 3.40 11/22/2022 03:45 AM    HGB 9.9 11/22/2022 03:45 AM    HCT 33.1 11/22/2022 03:45 AM    MCV 97.4 11/22/2022 03:45 AM    RDW 12.8 11/22/2022 03:45 AM     11/22/2022 03:45 AM       CMP:   Lab Results   Component Value Date/Time     11/22/2022 03:45 AM    K 4.2 11/22/2022 03:45 AM     11/22/2022 03:45 AM    CO2 21 11/22/2022 03:45 AM    BUN 18 11/22/2022 03:45 AM    CREATININE 1.3 11/22/2022 03:45 AM    GFRAA >60 04/22/2019 10:56 AM    LABGLOM 53 11/22/2022 03:45 AM    GLUCOSE 142 11/22/2022 03:45 AM    PROT 6.5 11/21/2022 12:04 PM    CALCIUM 9.0 11/22/2022 03:45 AM    BILITOT 0.3 11/21/2022 12:04 PM    ALKPHOS 94 11/21/2022 12:04 PM    AST 26 11/21/2022 12:04 PM    ALT 21 11/21/2022 12:04 PM       POC Tests: No results for input(s): POCGLU, POCNA, POCK, POCCL, POCBUN, POCHEMO, POCHCT in the last 72 hours.     Coags:   Lab Results   Component Value Date/Time    PROTIME 12.0 10/20/2018 08:00 AM    INR 1.1 10/20/2018 08:00 AM    APTT 28.3 10/20/2018 08:00 AM       HCG (If Applicable): No results found for: PREGTESTUR, PREGSERUM, HCG, HCGQUANT     ABGs: No results found for: PHART, PO2ART, URG7MHI, WVS4OZJ, BEART, X2RYPONO     Type & Screen (If Applicable):  No results found for: LABABO, LABRH    Drug/Infectious Status (If Applicable):  No results found for: HIV, HEPCAB    COVID-19 Screening (If Applicable): No results found for: COVID19        Anesthesia Evaluation  Patient summary reviewed no history of anesthetic complications:   Airway:           Dental:          Pulmonary:                              Cardiovascular:  Exercise tolerance: poor (<4 METS),   (+) hypertension:, valvular problems/murmurs: AS, pacemaker: pacemaker, CAD:, CABG/stent:, dysrhythmias:,          Beta Blocker:  Dose within 24 Hrs         Neuro/Psych:   (+) TIA,             GI/Hepatic/Renal:   (+) GERD:,           Endo/Other:    (+) Diabetesusing insulin, blood dyscrasia: anticoagulation therapy and anemia:., .          Pt had no PAT visit       Abdominal:             Vascular:   + PE. Other Findings:           Anesthesia Plan      MAC and general     ASA 4       Induction: intravenous. Anesthetic plan and risks discussed with patient. Plan discussed with CRNA.                     Baron Mccollum MD   11/22/2022

## 2022-11-22 NOTE — ANESTHESIA PRE PROCEDURE
Department of Anesthesiology  Preprocedure Note       Name:  Elvi Neal   Age:  81 y.o.  :  1935                                          MRN:  [id number]         Date:  2022      Surgeon: Nancy Martinez):  Elana Sequeira MD    Procedure: Procedure(s):  CYSTOSCOPY, EVACUATION OF CLOTS AND FULGURATION    Medications prior to admission:   Prior to Admission medications    Medication Sig Start Date End Date Taking? Authorizing Provider   apixaban (ELIQUIS) 5 MG TABS tablet Take by mouth 2 times daily   Yes Historical Provider, MD   Clobetasol Propionate 0.025 % CREA Apply topically as needed    Historical Provider, MD   nitroGLYCERIN (NITROSTAT) 0.4 MG SL tablet up to max of 3 total doses. If no relief after 1 dose, call 911. 19   Kaia Fentonr, MD   lisinopril (PRINIVIL;ZESTRIL) 10 MG tablet Take 10 mg by mouth 2 times daily    Historical Provider, MD   magnesium oxide (MAG-OX) 400 MG tablet Take 400 mg by mouth 2 times daily    Historical Provider, MD   atorvastatin (LIPITOR) 80 MG tablet Take 80 mg by mouth nightly    Historical Provider, MD   mycophenolate (CELLCEPT) 250 MG capsule Take by mouth 2 times daily Wife states he takes 6 capsules ( 1500mg ) in the morning and 6 capsules at night.     Historical Provider, MD   folic acid (FOLVITE) 597 MCG tablet Take 800 mcg by mouth daily    Historical Provider, MD   clopidogrel (PLAVIX) 75 MG tablet Take 75 mg by mouth daily    Historical Provider, MD   vitamin D-3 (CHOLECALCIFEROL) 5000 UNITS TABS Take 5,000 Units by mouth daily    Historical Provider, MD   metoprolol (LOPRESSOR) 25 MG tablet Take 12.5 mg by mouth daily    Historical Provider, MD   omeprazole (PRILOSEC) 20 MG capsule Take 20 mg by mouth Daily    Historical Provider, MD   tamsulosin (FLOMAX) 0.4 MG capsule Take 0.4 mg by mouth daily    Historical Provider, MD       Current medications:    Current Facility-Administered Medications   Medication Dose Route Frequency Provider Last Rate Last Admin    lactated ringers infusion   IntraVENous Continuous Yazmin Betts MD        atorvastatin (LIPITOR) tablet 80 mg  80 mg Oral Nightly Manuel Bahena PA-C   80 mg at 11/21/22 2023    finasteride (PROSCAR) tablet 5 mg  5 mg Oral Daily Pollo Bahena PA-C        folic acid (FOLVITE) tablet 1,000 mcg  1,000 mcg Oral Daily Pollo Bahena PA-C        lisinopril (PRINIVIL;ZESTRIL) tablet 10 mg  10 mg Oral BID Manuel Bahena PA-C   10 mg at 11/22/22 9049    magnesium oxide (MAG-OX) tablet 400 mg  400 mg Oral Daily Pollo Bahena PA-C        metoprolol tartrate (LOPRESSOR) tablet 25 mg  25 mg Oral BID Manuel Bahena PA-C   25 mg at 11/22/22 4455    pantoprazole (PROTONIX) tablet 40 mg  40 mg Oral QAM AC Pollo Bahena PA-C   40 mg at 11/22/22 0519    tamsulosin (FLOMAX) capsule 0.4 mg  0.4 mg Oral Daily Manuel Bahena PA-C   0.4 mg at 11/22/22 7114    sodium chloride flush 0.9 % injection 5-40 mL  5-40 mL IntraVENous 2 times per day Manuel Bahena PA-C   10 mL at 11/22/22 0900    sodium chloride flush 0.9 % injection 5-40 mL  5-40 mL IntraVENous PRN Manuel Bahena PA-C        0.9 % sodium chloride infusion   IntraVENous PRN Manuel Bahena PA-C        ondansetron (ZOFRAN-ODT) disintegrating tablet 4 mg  4 mg Oral Q8H PRN Pollo Bahena PA-C        Or    ondansetron [facility]) injection 4 mg  4 mg IntraVENous Q6H PRN Pollo Bahena PA-C        polyethylene glycol (GLYCOLAX) packet 17 g  17 g Oral Daily PRN Pollo Bahena PA-C        acetaminophen (TYLENOL) tablet 650 mg  650 mg Oral Q6H PRN Manuel Bahena PA-C   650 mg at 11/22/22 8242    Or    acetaminophen (TYLENOL) suppository 650 mg  650 mg Rectal Q6H PRN Manuel Bahena PA-C        cefTRIAXone (ROCEPHIN) 1,000 mg in sterile water 10 mL IV syringe  1,000 mg IntraVENous Q24H Amal Kd Bahena PA-C        glucose chewable tablet 16 g  4 tablet Oral PRN Manuel Bahena PA-C        dextrose bolus 10% 125 mL  125 mL IntraVENous PRN Pollo Bahena PA-C        Or    dextrose bolus 10% 250 mL  250 mL IntraVENous PRN Gama Bahena PA-C        glucagon (rDNA) injection 1 mg  1 mg SubCUTAneous PRN Pollo Bahena PA-C        dextrose 10 % infusion   IntraVENous Continuous PRN Pollo Bahena PA-C           Allergies:  No Known Allergies    Problem List:    Patient Active Problem List   Diagnosis Code    Mobitz II I44.1    Bradycardia R00.1    Chest pain R07.9    Coronary artery disease involving native coronary artery of native heart without angina pectoris I25.10    Complete heart block (HCC) I44.2    Aortic valve disease I35.9    Type 2 diabetes mellitus without complication, without long-term current use of insulin (HCC) E11.9    Pure hypercholesterolemia E78.00    Essential hypertension I10    Near syncope R55    Gross hematuria R31.0    Urinary tract infection with hematuria N39.0, R31.9    Anemia associated with acute blood loss D62    Bullous pemphigoid L12.0    Acidosis E87.20       Past Medical History:        Diagnosis Date    Aortic valve replaced 2015    Cancer ([facility] [address]     skin - removed     Diabetes mellitus ([facility] [address]     Elevated cholesterol     Hypertension     Pacemaker 2016    Prostate enlargement     Pulmonary emboli ([facility] [address] 2011    S/P CABG x 1 2015    TIA (transient ischemic attack) 2016       Past Surgical History:        Procedure Laterality Date    CARDIAC SURGERY  07/06/2015    AORTIV VALVE REPLACEMENT & BYPASS    PACEMAKER PLACEMENT         Social History:    Social History     Tobacco Use    Smoking status: Former    Smokeless tobacco: Former   Substance Use Topics    Alcohol use: Yes     Comment: RARELY                                 Counseling given: Not Answered      Vital Signs (Current):   Vitals:    11/22/22 0717 11/22/22 1349 11/22/22 1350 11/22/22 1405   BP: 135/61  (!) 78/49 (!) 92/45   Pulse: 84 97 90 93   Resp: 18 18 16 18   Temp: 98.2 °F (36.8 °C) 97.5 °F (36.4 °C) 97.5 °F (36.4 °C)    TempSrc: Oral Skin Temporal    SpO2: 95% 98% 96% 98%   Weight:       Height:                                                  BP Readings from Last 3 Encounters:   11/22/22 (!) 92/45   05/12/22 136/78   04/23/19 130/70       NPO Status: Time of last liquid consumption: 0930 (sip with med)                        Time of last solid consumption: 1900                        Date of last liquid consumption: 11/22/22                        Date of last solid food consumption: 11/21/22    BMI:   Wt Readings from Last 3 Encounters:   11/22/22 173 lb (78.5 kg)   05/12/22 180 lb (81.6 kg)   04/22/19 180 lb (81.6 kg)     Body mass index is 27.92 kg/m². CBC:   Lab Results   Component Value Date/Time    WBC 15.4 11/22/2022 03:45 AM    RBC 3.40 11/22/2022 03:45 AM    HGB 9.9 11/22/2022 03:45 AM    HCT 33.1 11/22/2022 03:45 AM    MCV 97.4 11/22/2022 03:45 AM    RDW 12.8 11/22/2022 03:45 AM     11/22/2022 03:45 AM       CMP:   Lab Results   Component Value Date/Time     11/22/2022 03:45 AM    K 4.2 11/22/2022 03:45 AM     11/22/2022 03:45 AM    CO2 21 11/22/2022 03:45 AM    BUN 18 11/22/2022 03:45 AM    CREATININE 1.3 11/22/2022 03:45 AM    GFRAA >60 04/22/2019 10:56 AM    LABGLOM 53 11/22/2022 03:45 AM    GLUCOSE 142 11/22/2022 03:45 AM    PROT 6.5 11/21/2022 12:04 PM    CALCIUM 9.0 11/22/2022 03:45 AM    BILITOT 0.3 11/21/2022 12:04 PM    ALKPHOS 94 11/21/2022 12:04 PM    AST 26 11/21/2022 12:04 PM    ALT 21 11/21/2022 12:04 PM       POC Tests: No results for input(s): POCGLU, POCNA, POCK, POCCL, POCBUN, POCHEMO, POCHCT in the last 72 hours.     Coags:   Lab Results   Component Value Date/Time    PROTIME 12.0 10/20/2018 08:00 AM    INR 1.1 10/20/2018 08:00 AM    APTT 28.3 10/20/2018 08:00 AM       HCG (If Applicable): No results found for: PREGTESTUR, PREGSERUM, HCG, HCGQUANT     ABGs: No results found for: PHART, PO2ART, REO9LWW, UVC3AHL, BEART, Y7FVASUO Type & Screen (If Applicable):  No results found for: LABABO, LABRH    Drug/Infectious Status (If Applicable):  No results found for: HIV, HEPCAB    COVID-19 Screening (If Applicable): No results found for: COVID19        Anesthesia Evaluation    Airway: Mallampati: II  TM distance: >3 FB   Neck ROM: limited  Mouth opening: > = 3 FB   Dental: normal exam         Pulmonary: breath sounds clear to auscultation                             Cardiovascular:            Rhythm: regular  Rate: normal                    Neuro/Psych:               GI/Hepatic/Renal:             Endo/Other:                     Abdominal:             Vascular:           Other Findings:           Anesthesia Plan        Lauryn Verma MD   11/22/2022

## 2022-11-23 LAB
ANION GAP SERPL CALCULATED.3IONS-SCNC: 10 MMOL/L (ref 7–16)
BASOPHILS ABSOLUTE: 0.03 E9/L (ref 0–0.2)
BASOPHILS RELATIVE PERCENT: 0.2 % (ref 0–2)
BUN BLDV-MCNC: 18 MG/DL (ref 6–23)
CALCIUM SERPL-MCNC: 8.3 MG/DL (ref 8.6–10.2)
CHLORIDE BLD-SCNC: 107 MMOL/L (ref 98–107)
CO2: 21 MMOL/L (ref 22–29)
CREAT SERPL-MCNC: 1.4 MG/DL (ref 0.7–1.2)
EOSINOPHILS ABSOLUTE: 0.04 E9/L (ref 0.05–0.5)
EOSINOPHILS RELATIVE PERCENT: 0.2 % (ref 0–6)
GFR SERPL CREATININE-BSD FRML MDRD: 49 ML/MIN/1.73
GLUCOSE BLD-MCNC: 138 MG/DL (ref 74–99)
HCT VFR BLD CALC: 30.6 % (ref 37–54)
HEMOGLOBIN: 9.4 G/DL (ref 12.5–16.5)
IMMATURE GRANULOCYTES #: 0.07 E9/L
IMMATURE GRANULOCYTES %: 0.4 % (ref 0–5)
LYMPHOCYTES ABSOLUTE: 3.01 E9/L (ref 1.5–4)
LYMPHOCYTES RELATIVE PERCENT: 16.7 % (ref 20–42)
MCH RBC QN AUTO: 29.6 PG (ref 26–35)
MCHC RBC AUTO-ENTMCNC: 30.7 % (ref 32–34.5)
MCV RBC AUTO: 96.2 FL (ref 80–99.9)
MONOCYTES ABSOLUTE: 0.89 E9/L (ref 0.1–0.95)
MONOCYTES RELATIVE PERCENT: 4.9 % (ref 2–12)
NEUTROPHILS ABSOLUTE: 14.03 E9/L (ref 1.8–7.3)
NEUTROPHILS RELATIVE PERCENT: 77.6 % (ref 43–80)
PDW BLD-RTO: 13.1 FL (ref 11.5–15)
PLATELET # BLD: 321 E9/L (ref 130–450)
PMV BLD AUTO: 8.9 FL (ref 7–12)
POTASSIUM REFLEX MAGNESIUM: 4.2 MMOL/L (ref 3.5–5)
POTASSIUM SERPL-SCNC: 4.2 MMOL/L (ref 3.5–5)
RBC # BLD: 3.18 E12/L (ref 3.8–5.8)
SODIUM BLD-SCNC: 138 MMOL/L (ref 132–146)
WBC # BLD: 18.1 E9/L (ref 4.5–11.5)

## 2022-11-23 PROCEDURE — 85025 COMPLETE CBC W/AUTO DIFF WBC: CPT

## 2022-11-23 PROCEDURE — 2580000003 HC RX 258: Performed by: STUDENT IN AN ORGANIZED HEALTH CARE EDUCATION/TRAINING PROGRAM

## 2022-11-23 PROCEDURE — 2580000003 HC RX 258: Performed by: INTERNAL MEDICINE

## 2022-11-23 PROCEDURE — 99232 SBSQ HOSP IP/OBS MODERATE 35: CPT | Performed by: INTERNAL MEDICINE

## 2022-11-23 PROCEDURE — 80048 BASIC METABOLIC PNL TOTAL CA: CPT

## 2022-11-23 PROCEDURE — 6370000000 HC RX 637 (ALT 250 FOR IP): Performed by: PHYSICIAN ASSISTANT

## 2022-11-23 PROCEDURE — 36415 COLL VENOUS BLD VENIPUNCTURE: CPT

## 2022-11-23 PROCEDURE — 1200000000 HC SEMI PRIVATE

## 2022-11-23 PROCEDURE — 51700 IRRIGATION OF BLADDER: CPT

## 2022-11-23 PROCEDURE — 6360000002 HC RX W HCPCS: Performed by: UROLOGY

## 2022-11-23 PROCEDURE — 99233 SBSQ HOSP IP/OBS HIGH 50: CPT | Performed by: INTERNAL MEDICINE

## 2022-11-23 PROCEDURE — 2580000003 HC RX 258: Performed by: PHYSICIAN ASSISTANT

## 2022-11-23 PROCEDURE — 6360000002 HC RX W HCPCS: Performed by: STUDENT IN AN ORGANIZED HEALTH CARE EDUCATION/TRAINING PROGRAM

## 2022-11-23 RX ADMIN — FOLIC ACID 1000 MCG: 1 TABLET ORAL at 08:01

## 2022-11-23 RX ADMIN — ACETAMINOPHEN 650 MG: 325 TABLET ORAL at 20:19

## 2022-11-23 RX ADMIN — PIPERACILLIN AND TAZOBACTAM 3375 MG: 3; .375 INJECTION, POWDER, FOR SOLUTION INTRAVENOUS at 02:33

## 2022-11-23 RX ADMIN — Medication 400 MG: at 08:01

## 2022-11-23 RX ADMIN — LISINOPRIL 10 MG: 10 TABLET ORAL at 08:01

## 2022-11-23 RX ADMIN — PANTOPRAZOLE SODIUM 40 MG: 40 TABLET, DELAYED RELEASE ORAL at 06:26

## 2022-11-23 RX ADMIN — SODIUM CHLORIDE, POTASSIUM CHLORIDE, SODIUM LACTATE AND CALCIUM CHLORIDE: 600; 310; 30; 20 INJECTION, SOLUTION INTRAVENOUS at 09:10

## 2022-11-23 RX ADMIN — ATORVASTATIN CALCIUM 80 MG: 40 TABLET, FILM COATED ORAL at 20:18

## 2022-11-23 RX ADMIN — METOPROLOL TARTRATE 25 MG: 25 TABLET, FILM COATED ORAL at 20:18

## 2022-11-23 RX ADMIN — FUROSEMIDE 10 MG: 10 INJECTION, SOLUTION INTRAVENOUS at 03:16

## 2022-11-23 RX ADMIN — PIPERACILLIN AND TAZOBACTAM 3375 MG: 3; .375 INJECTION, POWDER, FOR SOLUTION INTRAVENOUS at 18:02

## 2022-11-23 RX ADMIN — Medication 10 ML: at 08:01

## 2022-11-23 RX ADMIN — LISINOPRIL 10 MG: 10 TABLET ORAL at 20:18

## 2022-11-23 RX ADMIN — METOPROLOL TARTRATE 25 MG: 25 TABLET, FILM COATED ORAL at 08:01

## 2022-11-23 RX ADMIN — PIPERACILLIN AND TAZOBACTAM 3375 MG: 3; .375 INJECTION, POWDER, FOR SOLUTION INTRAVENOUS at 10:44

## 2022-11-23 ASSESSMENT — PAIN SCALES - GENERAL
PAINLEVEL_OUTOF10: 3
PAINLEVEL_OUTOF10: 0

## 2022-11-23 ASSESSMENT — PAIN DESCRIPTION - ORIENTATION: ORIENTATION: LOWER

## 2022-11-23 ASSESSMENT — PAIN DESCRIPTION - DESCRIPTORS: DESCRIPTORS: SPASM

## 2022-11-23 ASSESSMENT — PAIN DESCRIPTION - LOCATION: LOCATION: ABDOMEN

## 2022-11-23 NOTE — OP NOTE
[address] [facility]                  [address]                                OPERATIVE REPORT    PATIENT NAME: Omid Berrios                     :        1935  MED REC NO:   [id number]                            ROOM:       0518  ACCOUNT NO:   [de-identified]                           ADMIT DATE: 2022  PROVIDER:     Foster Mccormick MD    DATE OF PROCEDURE:  2022    PREOPERATIVE DIAGNOSIS:  Gross hematuria with urinary clot retention. POSTOPERATIVE DIAGNOSIS:  Gross hematuria with urinary clot retention,  secondary to prostatic fossa bleeding and obstruction. PROCEDURES PERFORMED:  Cystopanendoscopy, evacuation of clots, attempted  retrograde pyelogram, and transurethral resection of prostate. SURGEON:  Foster Mccormick M.D.    ESTIMATED BLOOD LOSS:  100 mL. ANESTHESIA:  General.    DESCRIPTION OF PROCEDURE:  With the patient in the lithotomy position  under satisfactory general anesthesia, the genitalia were prepped and  draped in a sterile manner. A 21-Setswana panendoscope passed easily  through the pendulous and membranous urethra. There were no strictures  or lesions seen. The prostatic fossa showed an extremely enlarged  median lobe with multiple vascularities scattered throughout, which bled  rather readily. Upon entering the bladder, the bladder had multiple  clots, which were evacuated with a Rizwana syringe. Inspection revealed  a very badly trabeculated bladder with multiple diverticuli. The  mucosal pattern was inflamed, but I did not see anything to suggest  bladder cancer. The trigone could really not be visualized because of  the distortion and because of the very large median lobe. Following  inspection, a 28-Setswana resectoscope sheath was placed into the bladder. The plan was to resect the, particularly the median lobe because of the  vascularity and the bleeding.   A complete transurethral resection of the  prostate was carried out using a Guan-Reyes resectoscope,  particularly with the median lobe and also the lateral and anterior  lobes. Fulguration achieved hemostasis. All clots and chips were  evacuated and a 24-30 mL three-way Currie catheter was inserted and  placed on continuous irrigation. The patient tolerated the procedure  well, was sent to the recovery room in satisfactory condition.         Candance Muskrat, MD    D: 11/22/2022 14:09:45       T: 11/22/2022 14:12:58     [email or web address]  Job#: [id number]     Doc#: [id number]    CC:

## 2022-11-23 NOTE — PROGRESS NOTES
INPATIENT CARDIOLOGY FOLLOW-UP    Name: Kurt Navarro    Age: 81 y.o. Date of Admission: 11/21/2022 11:40 AM    Date of Service: 11/23/2022    Chief Complaint: Follow-up for resolving gross hematuria, coronary atherosclerosis, aortic valve disease, atrioventricular block, paroxysmal atrial fibrillation/flutter, chronic kidney disease    Interim History: The patient is significantly subjectively improved with his urologic procedure reviewed. Continuous bladder irrigation is noted with resolution of hematuria and relatively stable hemoglobin levels. He is otherwise hemodynamically stable with no present evidence of hypervolemia and incomplete maintenance of intake and output with stable renal function. Review of Systems: The remainder of a complete multisystem review including consitutional, central nervous, respiratory, circulatory, gastrointestinal, genitourinary, endocrinologic, hematologic, musculoskeletal and psychiatric are negative.     Problem List:  Patient Active Problem List   Diagnosis    Mobitz II    Bradycardia    Chest pain    Coronary artery disease involving native coronary artery of native heart without angina pectoris    Complete heart block ([address]    Aortic valve disease    Type 2 diabetes mellitus without complication, without long-term current use of insulin ([address]    Pure hypercholesterolemia    Essential hypertension    Near syncope    Gross hematuria    Urinary tract infection with hematuria    Anemia associated with acute blood loss    Bullous pemphigoid    Acidosis    Complicated UTI (urinary tract infection)       Allergies:  No Known Allergies    Current Medications:  Current Facility-Administered Medications   Medication Dose Route Frequency Provider Last Rate Last Admin    lactated ringers infusion   IntraVENous Continuous Debbie Lopez  mL/hr at 11/22/22 1537 New Bag at 11/22/22 1537    0.9 % sodium chloride infusion   IntraVENous PRN Dorie Malave MD piperacillin-tazobactam (ZOSYN) 3,375 mg in dextrose 5 % 50 mL IVPB extended infusion (mini-bag)  3,375 mg IntraVENous Q8H Arlen Green MD   Stopped at 11/23/22 0642    atorvastatin (LIPITOR) tablet 80 mg  80 mg Oral Nightly Jarod Lagunas PA-C   80 mg at 11/22/22 2026    finasteride (PROSCAR) tablet 5 mg  5 mg Oral Daily Pollo Bahena PA-C        folic acid (FOLVITE) tablet 1,000 mcg  1,000 mcg Oral Daily Milderd Frankel Rachidi, PA-C   1,000 mcg at 11/23/22 0801    lisinopril (PRINIVIL;ZESTRIL) tablet 10 mg  10 mg Oral BID Milderd Frankel Rachidi, PA-C   10 mg at 11/23/22 0801    magnesium oxide (MAG-OX) tablet 400 mg  400 mg Oral Daily Milderd Frankel Rachidi, PA-C   400 mg at 11/23/22 0801    metoprolol tartrate (LOPRESSOR) tablet 25 mg  25 mg Oral BID Milderd Frankel Rachidi, PA-C   25 mg at 11/23/22 0801    pantoprazole (PROTONIX) tablet 40 mg  40 mg Oral QAM AC Pollo Bahena PA-C   40 mg at 11/23/22 3263    sodium chloride flush 0.9 % injection 5-40 mL  5-40 mL IntraVENous 2 times per day Milderd Frankel Rachidi, PA-C   10 mL at 11/23/22 0801    sodium chloride flush 0.9 % injection 5-40 mL  5-40 mL IntraVENous PRN Pollo Bahena PA-C        0.9 % sodium chloride infusion   IntraVENous PRN Milderd Frankel Rachidi, PA-C        ondansetron (ZOFRAN-ODT) disintegrating tablet 4 mg  4 mg Oral Q8H PRN Pollo Bahena PA-C        Or    ondansetron [facility]) injection 4 mg  4 mg IntraVENous Q6H PRN Pollo Bahena PA-C        polyethylene glycol (GLYCOLAX) packet 17 g  17 g Oral Daily PRN Pollo Bahena PA-C        acetaminophen (TYLENOL) tablet 650 mg  650 mg Oral Q6H PRN Milderd Frankellinda Bahena PA-C   650 mg at 11/22/22 2233    Or    acetaminophen (TYLENOL) suppository 650 mg  650 mg Rectal Q6H PRN Pollo Bahena PA-C        glucose chewable tablet 16 g  4 tablet Oral PRN Pollo Bahena PA-C        dextrose bolus 10% 125 mL  125 mL IntraVENous PRN Pollo Bahena PA-C        Or    dextrose bolus 10% 250 mL  250 mL IntraVENous PRN Pollo Bahena PA-C        glucagon (rDNA) injection 1 mg  1 mg SubCUTAneous PRN Pollo Bahena PA-C        dextrose 10 % infusion   IntraVENous Continuous PRN Gama Chriss Bahena PA-C          lactated ringers 125 mL/hr at 11/22/22 1537    sodium chloride      sodium chloride      dextrose         Physical Exam:  BP (!) 116/59   Pulse 85   Temp 98.8 °F (37.1 °C) (Oral)   Resp 16   Ht 5' 6\" (1.676 m)   Wt 173 lb (78.5 kg)   SpO2 96%   BMI 27.92 kg/m²   Weight change: Wt Readings from Last 3 Encounters:   11/22/22 173 lb (78.5 kg)   05/12/22 180 lb (81.6 kg)   04/22/19 180 lb (81.6 kg)     The patient is awake, alert and in no discomfort or distress. No gross musculoskeletal deformity is present. No significant skin or nail changes are present. Gross examination of head, eyes, nose and throat are negative. Jugular venous pressure is normal and no carotid bruits are present. Normal respiratory effort is noted with no accessory muscle usage present. Lung fields are clear to ascultation. Cardiac examination is notable for a regular rate and rhythm with no palpable thrill. No gallop rhythm and an early peaking systolic ejection murmur at the right upper sternal border are identified. A benign abdominal examination is present with no masses or organomegaly. Intact pulses are present throughout all extremities and no peripheral edema is present. No focal neurologic deficits are present. Intake/Output:    Intake/Output Summary (Last 24 hours) at 11/23/2022 0826  Last data filed at 11/23/2022 0729  Gross per 24 hour   Intake 815.33 ml   Output 4000 ml   Net -3184.67 ml     I/O this shift:  In: -   Out: 2000 [Urine:2000]    Laboratory Tests:  Lab Results   Component Value Date    CREATININE 1.4 (H) 11/23/2022    BUN 18 11/23/2022     11/23/2022    K 4.2 11/23/2022    K 4.2 11/23/2022     11/23/2022    CO2 21 (L) 11/23/2022     No results for input(s): CKTOTAL, CKMB in the last 72 hours.     Invalid input(s): TROPONONI  No results found for: BNP  Lab Results   Component Value Date/Time    WBC 18.1 11/23/2022 03:20 AM    RBC 3.18 11/23/2022 03:20 AM    HGB 9.4 11/23/2022 03:20 AM    HCT 30.6 11/23/2022 03:20 AM    MCV 96.2 11/23/2022 03:20 AM    MCH 29.6 11/23/2022 03:20 AM    MCHC 30.7 11/23/2022 03:20 AM    RDW 13.1 11/23/2022 03:20 AM     11/23/2022 03:20 AM    MPV 8.9 11/23/2022 03:20 AM     Recent Labs     11/21/22  1204   ALKPHOS 94   ALT 21   AST 26   PROT 6.5   BILITOT 0.3   LABALBU 3.8     No results found for: MG  Lab Results   Component Value Date/Time    PROTIME 12.0 10/20/2018 08:00 AM    INR 1.1 10/20/2018 08:00 AM     No results found for: TSH  No components found for: CHLPL  No results found for: TRIG  No results found for: HDL  No results found for: [address]    Cardiac Tests: none available    ASSESSMENT / PLAN: On a clinical basis, the patient appears compensated from a cardiovascular standpoint following his urologic intervention with continuous bladder irrigation presently demonstrating no evidence of residual hematuria. He is otherwise hemodynamically stable with a regular rhythm and no present clinical manifestations of hypervolemia and with incomplete maintenance of intake and output in part related to his continuous irrigation and need of careful monitoring of his volume status in addition to that of renal function and electrolytes. Ongoing nutritional support will remain essential to fluid mobilization and reducing risk of progressive debilitation. Once appropriate from a urologic standpoint, the resumption of oral anticoagulation will be necessary to reduce risk of embolic events in the face of his paroxysmal atrial fibrillation with need of continued careful monitoring of his serum creatinine level in conjunction with his age to optimize dosing of oral anticoagulation.   On long-term basis, continued aggressive risk factor modification of blood pressure and serum lipids will remain essential to reducing risk of future atherosclerotic development in addition to that of antibiotic prophylaxis time of all dental interventions to reduce risk of bacterial endocarditis. We will further evaluate him during hospitalization should additional cardiovascular difficulties or concerns arise with ongoing cardiovascular management deferred to his cardiologist and electrophysiology service at the [facility]. Note: This report was completed utilizing computer voice recognition software. Every effort has been made to ensure accuracy, however; inadvertent computerized transcription errors may be present. Neno Patel.  [facility], [facility]

## 2022-11-23 NOTE — PROGRESS NOTES
11/21/22  1204 11/22/22  0345 11/22/22  1438 11/23/22  0320   WBC 11.1 15.4*  --  18.1*   RBC 3.70* 3.40*  --  3.18*   HGB 10.4* 9.9* 8.9* 9.4*   HCT 35.9* 33.1* 29.2* 30.6*   MCV 97.0 97.4  --  96.2   MCH 28.1 29.1  --  29.6   MCHC 29.0* 29.9*  --  30.7*   RDW 12.9 12.8  --  13.1    379  --  321   MPV 8.9 9.1  --  8.9       CBC with Differential:    Lab Results   Component Value Date/Time    WBC 18.1 11/23/2022 03:20 AM    RBC 3.18 11/23/2022 03:20 AM    HGB 9.4 11/23/2022 03:20 AM    HCT 30.6 11/23/2022 03:20 AM     11/23/2022 03:20 AM    MCV 96.2 11/23/2022 03:20 AM    MCH 29.6 11/23/2022 03:20 AM    MCHC 30.7 11/23/2022 03:20 AM    RDW 13.1 11/23/2022 03:20 AM    LYMPHOPCT 16.7 11/23/2022 03:20 AM    MONOPCT 4.9 11/23/2022 03:20 AM    BASOPCT 0.2 11/23/2022 03:20 AM    MONOSABS 0.89 11/23/2022 03:20 AM    LYMPHSABS 3.01 11/23/2022 03:20 AM    EOSABS 0.04 11/23/2022 03:20 AM    BASOSABS 0.03 11/23/2022 03:20 AM     BMP:    Lab Results   Component Value Date/Time     11/23/2022 03:20 AM    K 4.2 11/23/2022 03:20 AM    K 4.2 11/23/2022 03:20 AM     11/23/2022 03:20 AM    CO2 21 11/23/2022 03:20 AM    BUN 18 11/23/2022 03:20 AM    LABALBU 3.8 11/21/2022 12:04 PM    CREATININE 1.4 11/23/2022 03:20 AM    CALCIUM 8.3 11/23/2022 03:20 AM    GFRAA >60 04/22/2019 10:56 AM    LABGLOM 49 11/23/2022 03:20 AM    GLUCOSE 138 11/23/2022 03:20 AM        Radiology:   [employer] RETROGRADE PYELOGRAM W WO KUB   Final Result   Intraprocedural fluoroscopic spot images as above. See separate procedure   report for more information. CT ABDOMEN PELVIS W IV CONTRAST Additional Contrast? None   Final Result   Currie catheter in the bladder with decompressed bladder demonstrating   irregularly thickened wall, suspicious for underlying malignancy. 1.8 cm   cystic structure in in the posterolateral region of the bladder, suspicious   for bladder diverticulum or ureterocele.   Urologic consultation and/or cystoscopy evaluation recommended. Enlarged prostate gland. Multiple bilateral renal cysts. Constipation. Assessment:    Principal Problem:    Gross hematuria  Active Problems:    Urinary tract infection with hematuria    Anemia associated with acute blood loss    Bullous pemphigoid    Acidosis    Complicated UTI (urinary tract infection)  Resolved Problems:    * No resolved hospital problems. *      Plan:  Hematuria s/p surg by urol incl turp. Irrigation per urology  Complicated Uti continue abx  ID following  Anemia with acute blood loss component monitor and transfuse prn  Atrial fib continue med but hold anticoagulation  Acidosis monitor  Bullous pemphigoid monitor  Htn monitor bp and adjust meds as needed  Hyperlipidemia continue statin    Will give IS for pt to use.      Electronically signed by Austin Meyers DO on 11/23/2022 at 5:36 PM

## 2022-11-23 NOTE — PLAN OF CARE
Problem: ABCDS Injury Assessment  Goal: Absence of physical injury  11/23/2022 0409 by Trena Lee RN  Outcome: Progressing  11/22/2022 2216 by Ham Friedman RN  Outcome: Progressing     Problem: Discharge Planning  Goal: Discharge to home or other facility with appropriate resources  11/23/2022 0409 by Trena Lee RN  Outcome: Progressing  11/22/2022 2216 by Ham Friedman RN  Outcome: Progressing     Problem: Safety - Adult  Goal: Free from fall injury  11/23/2022 0409 by Trena Lee RN  Outcome: Progressing  11/22/2022 2216 by Ham Friedman RN  Outcome: Progressing     Problem: Pain  Goal: Verbalizes/displays adequate comfort level or baseline comfort level  11/23/2022 0409 by Trena Lee RN  Outcome: Progressing  11/22/2022 2216 by Ham Friedman RN  Outcome: Progressing     Problem: Chronic Conditions and Co-morbidities  Goal: Patient's chronic conditions and co-morbidity symptoms are monitored and maintained or improved  11/23/2022 0409 by Trena Lee RN  Outcome: Progressing  11/22/2022 2216 by Ham Friedman RN  Outcome: Progressing

## 2022-11-23 NOTE — PROGRESS NOTES
P Quality Flow/Interdisciplinary Rounds Progress Note        Quality Flow Rounds held on November 23, 2022    Disciplines Attending:  Bedside Nurse, , , and Nursing Unit Leadership    Sonya Brewer was admitted on 11/21/2022 11:40 AM    Anticipated Discharge Date:       Disposition:    Junior Score:  Junior Scale Score: 19    Readmission Risk              Risk of Unplanned Readmission:  16           Discussed patient goal for the day, patient clinical progression, and barriers to discharge.   The following Goal(s) of the Day/Commitment(s) have been identified:  Labs - Report Results monitor hgb      Taisha Willson RN  November 23, 2022

## 2022-11-23 NOTE — PROGRESS NOTES
[address] [place] [address] [facility]  [facility]  Progress Note    SUBJECTIVE:  Chief Complaint   Patient presents with    Dysuria     X 1 wk, has been seeing urology and was self cathing     Urinary Frequency     Patient is tolerating medications. No reported adverse drug reactions. No nausea, vomiting, diarrhea. In bed, wife at bedside  CBI running, pink urine  Having some bladder spasms but they aren't as frequent     Review of systems:  As stated above in the chief complaint, otherwise negative. Medications:  Scheduled Meds:   piperacillin-tazobactam  3,375 mg IntraVENous Q8H    atorvastatin  80 mg Oral Nightly    finasteride  5 mg Oral Daily    folic acid  9,432 mcg Oral Daily    lisinopril  10 mg Oral BID    magnesium oxide  400 mg Oral Daily    metoprolol tartrate  25 mg Oral BID    pantoprazole  40 mg Oral QAM AC    sodium chloride flush  5-40 mL IntraVENous 2 times per day     Continuous Infusions:   lactated ringers 50 mL/hr at 22 0910    sodium chloride      sodium chloride      dextrose       PRN Meds:sodium chloride, sodium chloride flush, sodium chloride, ondansetron **OR** ondansetron, polyethylene glycol, acetaminophen **OR** acetaminophen, glucose, dextrose bolus **OR** dextrose bolus, glucagon (rDNA), dextrose    OBJECTIVE:  BP (!) 99/50   Pulse 78   Temp 98.5 °F (36.9 °C) (Oral)   Resp 16   Ht 5' 6\" (1.676 m)   Wt 173 lb (78.5 kg)   SpO2 96%   BMI 27.92 kg/m²   Temp  Av °F (36.7 °C)  Min: 97.5 °F (36.4 °C)  Max: 98.8 °F (37.1 °C)  Constitutional: The patient is awake, alert, and oriented. In bed. Wife at bedside. Skin: Warm and dry. No rashes were noted. HEENT: Round and reactive pupils. Moist mucous membranes. No ulcerations or thrush. Neck: Supple to movements. Chest: No use of accessory muscles to breathe. Symmetrical expansion. No wheezing, crackles or rhonchi. Cardiovascular: S1 and S2 are rhythmic and regular. No murmurs appreciated. Pacer in place. Abdomen: Positive bowel sounds to auscultation. Benign to palpation. No masses felt. No hepatosplenomegaly. Extremities: No clubbing, no cyanosis, no edema. Lines: peripheral   Currie - pink urine     Laboratory and Tests Review:  Lab Results   Component Value Date    WBC 18.1 (H) 11/23/2022    WBC 15.4 (H) 11/22/2022    WBC 11.1 11/21/2022    HGB 9.4 (L) 11/23/2022    HCT 30.6 (L) 11/23/2022    MCV 96.2 11/23/2022     11/23/2022     Lab Results   Component Value Date    NEUTROABS 14.03 (H) 11/23/2022    NEUTROABS 11.01 (H) 11/22/2022    NEUTROABS 6.83 11/21/2022     No results found for: CRPHS  Lab Results   Component Value Date    ALT 21 11/21/2022    AST 26 11/21/2022    ALKPHOS 94 11/21/2022    BILITOT 0.3 11/21/2022     Lab Results   Component Value Date/Time     11/23/2022 03:20 AM    K 4.2 11/23/2022 03:20 AM    K 4.2 11/23/2022 03:20 AM     11/23/2022 03:20 AM    CO2 21 11/23/2022 03:20 AM    BUN 18 11/23/2022 03:20 AM    CREATININE 1.4 11/23/2022 03:20 AM    CREATININE 1.3 11/22/2022 03:45 AM    CREATININE 1.3 11/21/2022 12:04 PM    GFRAA >60 04/22/2019 10:56 AM    LABGLOM 49 11/23/2022 03:20 AM    GLUCOSE 138 11/23/2022 03:20 AM    PROT 6.5 11/21/2022 12:04 PM    LABALBU 3.8 11/21/2022 12:04 PM    CALCIUM 8.3 11/23/2022 03:20 AM    BILITOT 0.3 11/21/2022 12:04 PM    ALKPHOS 94 11/21/2022 12:04 PM    AST 26 11/21/2022 12:04 PM    ALT 21 11/21/2022 12:04 PM     No results found for: CRP  No results found for: [address]  Radiology:      Microbiology:   Urine Culture 11/21/22: pending     ASSESSMENT:  Complicated UTI:   Spontaneous hematuria from anticoagulation use: s/p cystoscopy with clot evacuation 11/22  Leucocytosis    PLAN:  Continue IV Zosyn pending cultures   Check final cultures  Monitor labs  Will continue to follow     RENETTA Ramirez CNP  11:49 AM  11/23/2022     Pt seen and examined. Above discussed agree with advanced practice nurse.  Labs, cultures, and radiographs reviewed. Face to Face encounter occurred. Changes made as necessary.      Nataliia Bailey MD

## 2022-11-23 NOTE — PROGRESS NOTES
[facility]   Progress Note    Admitting Date and Time: 11/21/2022 11:40 AM  Admit Dx: Syeda Ballard hematuria [R31.0]  Acute cystitis with hematuria [N30.01]  BPH with elevated PSA [N40.0, R97.20]    Subjective:    Patient was admitted with Gross hematuria [R31.0]  Acute cystitis with hematuria [N30.01]  BPH with elevated PSA [N40.0, R97.20].  Patient denies fever, chills, cp, sob, n/v.     piperacillin-tazobactam  3,375 mg IntraVENous Q8H    atorvastatin  80 mg Oral Nightly    finasteride  5 mg Oral Daily    folic acid  3,753 mcg Oral Daily    lisinopril  10 mg Oral BID    magnesium oxide  400 mg Oral Daily    metoprolol tartrate  25 mg Oral BID    pantoprazole  40 mg Oral QAM AC    tamsulosin  0.4 mg Oral Daily    sodium chloride flush  5-40 mL IntraVENous 2 times per day     sodium chloride, , PRN  furosemide, 10 mg, PRN  sodium chloride flush, 5-40 mL, PRN  sodium chloride, , PRN  ondansetron, 4 mg, Q8H PRN   Or  ondansetron, 4 mg, Q6H PRN  polyethylene glycol, 17 g, Daily PRN  acetaminophen, 650 mg, Q6H PRN   Or  acetaminophen, 650 mg, Q6H PRN  glucose, 4 tablet, PRN  dextrose bolus, 125 mL, PRN   Or  dextrose bolus, 250 mL, PRN  glucagon (rDNA), 1 mg, PRN  dextrose, , Continuous PRN         Objective:    BP (!) 124/56   Pulse 97   Temp 97.8 °F (36.6 °C) (Oral)   Resp 18   Ht 5' 6\" (1.676 m)   Wt 173 lb (78.5 kg)   SpO2 98%   BMI 27.92 kg/m²   Skin: warm and dry, no rash or erythema  Pulmonary/Chest: clear to auscultation bilaterally- no wheezes, rales or rhonchi, normal air movement, no respiratory distress  Cardiovascular: rhythm reg at rate of 98  Abdomen: soft, non-tender, non-distended, normal bowel sounds, no masses or organomegaly  Extremities: no cyanosis, no clubbing, and no edema      Recent Labs     11/21/22  1204 11/22/22  0345    137   K 4.4 4.2   * 105   CO2 21* 21*   BUN 23 18   CREATININE 1.3* 1.3*   GLUCOSE 116* 142*   CALCIUM 9.2 9.0       Recent Labs 11/21/22  1204 11/22/22  0345 11/22/22  1438   WBC 11.1 15.4*  --    RBC 3.70* 3.40*  --    HGB 10.4* 9.9* 8.9*   HCT 35.9* 33.1* 29.2*   MCV 97.0 97.4  --    MCH 28.1 29.1  --    MCHC 29.0* 29.9*  --    RDW 12.9 12.8  --     379  --    MPV 8.9 9.1  --        CBC with Differential:    Lab Results   Component Value Date/Time    WBC 15.4 11/22/2022 03:45 AM    RBC 3.40 11/22/2022 03:45 AM    HGB 8.9 11/22/2022 02:38 PM    HCT 29.2 11/22/2022 02:38 PM     11/22/2022 03:45 AM    MCV 97.4 11/22/2022 03:45 AM    MCH 29.1 11/22/2022 03:45 AM    MCHC 29.9 11/22/2022 03:45 AM    RDW 12.8 11/22/2022 03:45 AM    LYMPHOPCT 20.1 11/22/2022 03:45 AM    MONOPCT 7.1 11/22/2022 03:45 AM    BASOPCT 0.3 11/22/2022 03:45 AM    MONOSABS 1.10 11/22/2022 03:45 AM    LYMPHSABS 3.10 11/22/2022 03:45 AM    EOSABS 0.07 11/22/2022 03:45 AM    BASOSABS 0.05 11/22/2022 03:45 AM     BMP:    Lab Results   Component Value Date/Time     11/22/2022 03:45 AM    K 4.2 11/22/2022 03:45 AM     11/22/2022 03:45 AM    CO2 21 11/22/2022 03:45 AM    BUN 18 11/22/2022 03:45 AM    LABALBU 3.8 11/21/2022 12:04 PM    CREATININE 1.3 11/22/2022 03:45 AM    CALCIUM 9.0 11/22/2022 03:45 AM    GFRAA >60 04/22/2019 10:56 AM    LABGLOM 53 11/22/2022 03:45 AM    GLUCOSE 142 11/22/2022 03:45 AM        Radiology:   [facility] RETROGRADE PYELOGRAM W WO KUB   Final Result   Intraprocedural fluoroscopic spot images as above. See separate procedure   report for more information. CT ABDOMEN PELVIS W IV CONTRAST Additional Contrast? None   Final Result   Currie catheter in the bladder with decompressed bladder demonstrating   irregularly thickened wall, suspicious for underlying malignancy. 1.8 cm   cystic structure in in the posterolateral region of the bladder, suspicious   for bladder diverticulum or ureterocele. Urologic consultation and/or   cystoscopy evaluation recommended. Enlarged prostate gland. Multiple bilateral renal cysts. Constipation. Assessment:    Principal Problem:    Gross hematuria  Active Problems:    Urinary tract infection with hematuria    Anemia associated with acute blood loss    Bullous pemphigoid    Acidosis  Resolved Problems:    * No resolved hospital problems. *      Plan:  Hematuria s/p surg by urol incl turp.  Irrigation per urology  Complicated Uti continue abx  ID following  Anemia with acute blood loss component monitor and transfuse prn  Atrial fib continue med but hold anticoagulation  Acidosis monitor  Bullous pemphigoid monitor  Htn monitor bp and adjust meds as needed  Hyperlipidemia continue statin        Electronically signed by Amira Patton DO on 11/22/2022 at 7:56 PM

## 2022-11-23 NOTE — PROGRESS NOTES
SUBJECTIVE:    Afebrile  Pt had two units blood yesterday  hb 9 grams this am  Currie irrigated freee of some clots  Pt having some bladder spasms  Creatinine 1.4    OBJECTIVE:    /61   Pulse 81   Temp 97.7 °F (36.5 °C)   Resp 16   Ht 5' 6\" (1.676 m)   Wt 173 lb (78.5 kg)   SpO2 96%   BMI 27.92 kg/m²     PHYSICAL EXAMINATION:  Skin: dry, without rashes  Respirations: non-labored, intact  Abdomen: soft, non-tender, non-distended  Currie irrigated      Lab Results   Component Value Date    WBC 18.1 (H) 11/23/2022    HGB 9.4 (L) 11/23/2022    HCT 30.6 (L) 11/23/2022    MCV 96.2 11/23/2022     11/23/2022       Lab Results   Component Value Date    CREATININE 1.4 (H) 11/23/2022       No results found for: PSA    REVIEW OF SYSTEMS:    CONSTITUTIONAL: negative  HEENT: negative  HEMATOLOGIC: negative  ENDOCRINE: negative  RESPIRATORY: negative  CV: negative  GI: negative  NEURO: negative  ORTHOPEDICS: negative  PSYCHIATRIC: negative  : as above    PAST FAMILY HISTORY:  No family history on file.   PAST SOCIAL HISTORY:    Social History     Socioeconomic History    Marital status:    Tobacco Use    Smoking status: Former    Smokeless tobacco: Former   Substance and Sexual Activity    Alcohol use: Yes     Comment: RARELY     Drug use: No       Scheduled Meds:   piperacillin-tazobactam  3,375 mg IntraVENous Q8H    atorvastatin  80 mg Oral Nightly    finasteride  5 mg Oral Daily    folic acid  7,962 mcg Oral Daily    lisinopril  10 mg Oral BID    magnesium oxide  400 mg Oral Daily    metoprolol tartrate  25 mg Oral BID    pantoprazole  40 mg Oral QAM AC    tamsulosin  0.4 mg Oral Daily    sodium chloride flush  5-40 mL IntraVENous 2 times per day     Continuous Infusions:   lactated ringers 125 mL/hr at 11/22/22 1537    sodium chloride      sodium chloride      dextrose       PRN Meds:.sodium chloride, sodium chloride flush, sodium chloride, ondansetron **OR** ondansetron, polyethylene glycol, acetaminophen **OR** acetaminophen, glucose, dextrose bolus **OR** dextrose bolus, glucagon (rDNA), dextrose    ASSESSMENT:      Patient Active Problem List   Diagnosis    Mobitz II    Bradycardia    Chest pain    Coronary artery disease involving native coronary artery of native heart without angina pectoris    Complete heart block (HCC)    Aortic valve disease    Type 2 diabetes mellitus without complication, without long-term current use of insulin (HCC)    Pure hypercholesterolemia    Essential hypertension    Near syncope    Gross hematuria    Urinary tract infection with hematuria    Anemia associated with acute blood loss    Bullous pemphigoid    Acidosis    Complicated UTI (urinary tract infection)       PLAN:  Continue with CBI and irrigation  Follow hb    Dasia Luo MD, M.D.  11/23/2022  6:29 AM

## 2022-11-24 PROBLEM — I10 PRIMARY HYPERTENSION: Status: ACTIVE | Noted: 2022-11-24

## 2022-11-24 PROBLEM — N17.9 ACUTE KIDNEY INJURY (HCC): Status: ACTIVE | Noted: 2022-11-24

## 2022-11-24 LAB
ANION GAP SERPL CALCULATED.3IONS-SCNC: 7 MMOL/L (ref 7–16)
BASOPHILS ABSOLUTE: 0.03 E9/L (ref 0–0.2)
BASOPHILS RELATIVE PERCENT: 0.2 % (ref 0–2)
BUN BLDV-MCNC: 23 MG/DL (ref 6–23)
CALCIUM SERPL-MCNC: 8.3 MG/DL (ref 8.6–10.2)
CHLORIDE BLD-SCNC: 109 MMOL/L (ref 98–107)
CO2: 24 MMOL/L (ref 22–29)
CREAT SERPL-MCNC: 1.6 MG/DL (ref 0.7–1.2)
EOSINOPHILS ABSOLUTE: 0.21 E9/L (ref 0.05–0.5)
EOSINOPHILS RELATIVE PERCENT: 1.7 % (ref 0–6)
GFR SERPL CREATININE-BSD FRML MDRD: 42 ML/MIN/1.73
GLUCOSE BLD-MCNC: 128 MG/DL (ref 74–99)
HCT VFR BLD CALC: 27 % (ref 37–54)
HEMOGLOBIN: 8.4 G/DL (ref 12.5–16.5)
IMMATURE GRANULOCYTES #: 0.06 E9/L
IMMATURE GRANULOCYTES %: 0.5 % (ref 0–5)
LYMPHOCYTES ABSOLUTE: 3.2 E9/L (ref 1.5–4)
LYMPHOCYTES RELATIVE PERCENT: 25.6 % (ref 20–42)
MCH RBC QN AUTO: 29.7 PG (ref 26–35)
MCHC RBC AUTO-ENTMCNC: 31.1 % (ref 32–34.5)
MCV RBC AUTO: 95.4 FL (ref 80–99.9)
MONOCYTES ABSOLUTE: 0.88 E9/L (ref 0.1–0.95)
MONOCYTES RELATIVE PERCENT: 7 % (ref 2–12)
NEUTROPHILS ABSOLUTE: 8.13 E9/L (ref 1.8–7.3)
NEUTROPHILS RELATIVE PERCENT: 65 % (ref 43–80)
ORGANISM: ABNORMAL
PDW BLD-RTO: 13.3 FL (ref 11.5–15)
PLATELET # BLD: 304 E9/L (ref 130–450)
PMV BLD AUTO: 8.8 FL (ref 7–12)
POTASSIUM REFLEX MAGNESIUM: 4 MMOL/L (ref 3.5–5)
POTASSIUM SERPL-SCNC: 4 MMOL/L (ref 3.5–5)
RBC # BLD: 2.83 E12/L (ref 3.8–5.8)
SODIUM BLD-SCNC: 140 MMOL/L (ref 132–146)
URINE CULTURE, ROUTINE: ABNORMAL
WBC # BLD: 12.5 E9/L (ref 4.5–11.5)

## 2022-11-24 PROCEDURE — 6360000002 HC RX W HCPCS: Performed by: STUDENT IN AN ORGANIZED HEALTH CARE EDUCATION/TRAINING PROGRAM

## 2022-11-24 PROCEDURE — 99233 SBSQ HOSP IP/OBS HIGH 50: CPT | Performed by: INTERNAL MEDICINE

## 2022-11-24 PROCEDURE — 2580000003 HC RX 258

## 2022-11-24 PROCEDURE — 2580000003 HC RX 258: Performed by: PHYSICIAN ASSISTANT

## 2022-11-24 PROCEDURE — 85025 COMPLETE CBC W/AUTO DIFF WBC: CPT

## 2022-11-24 PROCEDURE — 36415 COLL VENOUS BLD VENIPUNCTURE: CPT

## 2022-11-24 PROCEDURE — 2580000003 HC RX 258: Performed by: INTERNAL MEDICINE

## 2022-11-24 PROCEDURE — 2580000003 HC RX 258: Performed by: STUDENT IN AN ORGANIZED HEALTH CARE EDUCATION/TRAINING PROGRAM

## 2022-11-24 PROCEDURE — 1200000000 HC SEMI PRIVATE

## 2022-11-24 PROCEDURE — 6370000000 HC RX 637 (ALT 250 FOR IP): Performed by: UROLOGY

## 2022-11-24 PROCEDURE — 6370000000 HC RX 637 (ALT 250 FOR IP): Performed by: PHYSICIAN ASSISTANT

## 2022-11-24 PROCEDURE — 6360000002 HC RX W HCPCS

## 2022-11-24 PROCEDURE — 99232 SBSQ HOSP IP/OBS MODERATE 35: CPT | Performed by: INTERNAL MEDICINE

## 2022-11-24 PROCEDURE — 80048 BASIC METABOLIC PNL TOTAL CA: CPT

## 2022-11-24 RX ORDER — OXYCODONE HYDROCHLORIDE AND ACETAMINOPHEN 5; 325 MG/1; MG/1
2 TABLET ORAL EVERY 4 HOURS PRN
Status: DISCONTINUED | OUTPATIENT
Start: 2022-11-24 | End: 2022-11-28 | Stop reason: HOSPADM

## 2022-11-24 RX ORDER — OXYCODONE HYDROCHLORIDE AND ACETAMINOPHEN 5; 325 MG/1; MG/1
1 TABLET ORAL EVERY 4 HOURS PRN
Status: DISCONTINUED | OUTPATIENT
Start: 2022-11-24 | End: 2022-11-28 | Stop reason: HOSPADM

## 2022-11-24 RX ORDER — SODIUM CHLORIDE 9 MG/ML
INJECTION, SOLUTION INTRAVENOUS CONTINUOUS
Status: DISCONTINUED | OUTPATIENT
Start: 2022-11-24 | End: 2022-11-26

## 2022-11-24 RX ADMIN — ATORVASTATIN CALCIUM 80 MG: 40 TABLET, FILM COATED ORAL at 19:59

## 2022-11-24 RX ADMIN — OXYCODONE AND ACETAMINOPHEN 1 TABLET: 5; 325 TABLET ORAL at 07:56

## 2022-11-24 RX ADMIN — METOPROLOL TARTRATE 25 MG: 25 TABLET, FILM COATED ORAL at 07:57

## 2022-11-24 RX ADMIN — PIPERACILLIN AND TAZOBACTAM 3375 MG: 3; .375 INJECTION, POWDER, FOR SOLUTION INTRAVENOUS at 02:32

## 2022-11-24 RX ADMIN — Medication 10 ML: at 19:59

## 2022-11-24 RX ADMIN — Medication 10 ML: at 07:57

## 2022-11-24 RX ADMIN — PIPERACILLIN AND TAZOBACTAM 3375 MG: 3; .375 INJECTION, POWDER, FOR SOLUTION INTRAVENOUS at 09:35

## 2022-11-24 RX ADMIN — OXYCODONE AND ACETAMINOPHEN 1 TABLET: 5; 325 TABLET ORAL at 21:30

## 2022-11-24 RX ADMIN — SODIUM CHLORIDE: 9 INJECTION, SOLUTION INTRAVENOUS at 17:15

## 2022-11-24 RX ADMIN — WATER 1000 MG: 1 INJECTION INTRAMUSCULAR; INTRAVENOUS; SUBCUTANEOUS at 14:31

## 2022-11-24 RX ADMIN — PANTOPRAZOLE SODIUM 40 MG: 40 TABLET, DELAYED RELEASE ORAL at 06:27

## 2022-11-24 RX ADMIN — METOPROLOL TARTRATE 25 MG: 25 TABLET, FILM COATED ORAL at 19:59

## 2022-11-24 RX ADMIN — Medication 400 MG: at 07:57

## 2022-11-24 RX ADMIN — FOLIC ACID 1000 MCG: 1 TABLET ORAL at 07:57

## 2022-11-24 ASSESSMENT — PAIN SCALES - GENERAL
PAINLEVEL_OUTOF10: 5
PAINLEVEL_OUTOF10: 4
PAINLEVEL_OUTOF10: 0

## 2022-11-24 ASSESSMENT — PAIN DESCRIPTION - LOCATION
LOCATION: ABDOMEN
LOCATION: KNEE

## 2022-11-24 ASSESSMENT — PAIN DESCRIPTION - ORIENTATION
ORIENTATION: RIGHT;LEFT
ORIENTATION: LOWER

## 2022-11-24 ASSESSMENT — PAIN DESCRIPTION - PAIN TYPE: TYPE: CHRONIC PAIN

## 2022-11-24 ASSESSMENT — PAIN DESCRIPTION - DESCRIPTORS
DESCRIPTORS: SPASM
DESCRIPTORS: ACHING

## 2022-11-24 ASSESSMENT — PAIN DESCRIPTION - FREQUENCY: FREQUENCY: CONTINUOUS

## 2022-11-24 ASSESSMENT — PAIN DESCRIPTION - ONSET: ONSET: ON-GOING

## 2022-11-24 NOTE — PROGRESS NOTES
PROGRESS NOTE    Chief Complaint:   BPH/Elevated PSA/Gross hematuria/Urinary retention/UTI    HPI:   He slept decently last night. He has occasional bladder spasms. His wife is at his bedside    Vitals:    11/24/22 0436   BP: 138/63   Pulse: 69   Resp: 16   Temp: 98.5 °F (36.9 °C)   SpO2: 94%       Allergies: Patient has no known allergies. PAST MEDICAL HISTORY:   Past Medical History:   Diagnosis Date    Aortic valve replaced 2015    Cancer ([facility] [address]     skin - removed     Diabetes mellitus ([facility] [address]     Elevated cholesterol     Hypertension     Pacemaker 2016    Prostate enlargement     Pulmonary emboli ([facility] [address] 2011    S/P CABG x 1 2015    TIA (transient ischemic attack) 2016       PAST SURGICAL HISTORY:   Past Surgical History:   Procedure Laterality Date    CARDIAC SURGERY  07/06/2015    AORTIV VALVE REPLACEMENT & BYPASS    CYSTOSCOPY N/A 11/22/2022    CYSTOSCOPY, EVACUATION OF CLOTS AND FULGURATION performed by Kirill Dunlap MD at [facility]:  No family history on file. PAST SOCIAL HISTORY:    Social History     Socioeconomic History    Marital status:    Tobacco Use    Smoking status: Former    Smokeless tobacco: Former   Substance and Sexual Activity    Alcohol use: Yes     Comment: RARELY     Drug use: No    with 4 daughters.   Retired from [employer]    IV:    lactated ringers 50 mL/hr at 11/23/22 0910    sodium chloride      sodium chloride      dextrose         PRN: oxyCODONE-acetaminophen **OR** oxyCODONE-acetaminophen, sodium chloride, sodium chloride flush, sodium chloride, ondansetron **OR** ondansetron, polyethylene glycol, acetaminophen **OR** acetaminophen, glucose, dextrose bolus **OR** dextrose bolus, glucagon (rDNA), dextrose    Scheduled:    piperacillin-tazobactam  3,375 mg IntraVENous Q8H    atorvastatin  80 mg Oral Nightly    folic acid  8,689 mcg Oral Daily    [Held by provider] lisinopril  10 mg Oral BID    magnesium oxide 400 mg Oral Daily    metoprolol tartrate  25 mg Oral BID    pantoprazole  40 mg Oral QAM AC    sodium chloride flush  5-40 mL IntraVENous 2 times per day       Lab Results   Component Value Date/Time     11/24/2022 04:38 AM    K 4.0 11/24/2022 04:38 AM    K 4.0 11/24/2022 04:38 AM    BUN 23 11/24/2022 04:38 AM    CREATININE 1.6 11/24/2022 04:38 AM        Lab Results   Component Value Date/Time    HGB 8.4 11/24/2022 04:38 AM    HCT 27.0 11/24/2022 04:38 AM       No results found for: PSA    Review Of Systems:  Constitutional: Tired  Eyes: negative  Ears, nose, mouth, throat, and face: negative  Respiratory: negative  Cardiovascular: Hypertension  Gastrointestinal: negative  Genitourinary: BPH  Musculoskeletal: negative  Neurological: TIA  Behavioral/Psych: negative  Endocrine: Diabetes    Physical Exam:  Skin is dry, and without rashes  Respirations are non-labored, intact  Abdomen is soft, non-tender, non-distended. Active bowel sounds are present. No rebound or guarding  Alert and oriented x 3. No focal motor/sensory deficits  Three-way Currie catheter is draining clear, light pink-colored urine with minimal CBI. Assessment and Plan:  POD#2--S/P Cysto/Clot evacuation/TURP  -Urine culture on 11/21/2022 is growing gram-negative rods. Continue broad-spectrum antibiotics per infectious disease recommendations  -Continue off Flomax and Proscar  -Currie catheter was easily manually irrigated by myself this morning. There were no clots. Continue manual cath irrigations each shift and as needed. Stop CBI and monitor for hematuria recurrence  -Hold off on resuming anticoagulation for at least another day (discussed with Dr. Renay Joy)  -Urine cytology is pending  -Voiding trial once medically stable and ambulatory and after hematuria has fully resolved.   Possibly as an outpatient  -Percocet as needed for pain control  -We will continue to follow him during his hospital stay    [address] [facility], MD  11/24/2022  6:36 AM

## 2022-11-24 NOTE — PROGRESS NOTES
[address] [place] [address] [facility]  Progress Note    SUBJECTIVE:  Chief Complaint   Patient presents with    Dysuria     X 1 wk, has been seeing urology and was self cathing     Urinary Frequency     Patient is tolerating medications. No reported adverse drug reactions. No nausea, vomiting, diarrhea. In bed, multiple family members at bedside  Bladder spasms are improving  CBI stopped, urine light red color   Afebrile     Review of systems:  As stated above in the chief complaint, otherwise negative. Medications:  Scheduled Meds:   piperacillin-tazobactam  3,375 mg IntraVENous Q8H    atorvastatin  80 mg Oral Nightly    folic acid  7,873 mcg Oral Daily    [Held by provider] lisinopril  10 mg Oral BID    magnesium oxide  400 mg Oral Daily    metoprolol tartrate  25 mg Oral BID    pantoprazole  40 mg Oral QAM AC    sodium chloride flush  5-40 mL IntraVENous 2 times per day     Continuous Infusions:   sodium chloride      sodium chloride      dextrose       PRN Meds:oxyCODONE-acetaminophen **OR** oxyCODONE-acetaminophen, sodium chloride, sodium chloride flush, sodium chloride, ondansetron **OR** ondansetron, polyethylene glycol, acetaminophen **OR** acetaminophen, glucose, dextrose bolus **OR** dextrose bolus, glucagon (rDNA), dextrose    OBJECTIVE:  /60   Pulse 72   Temp 98.7 °F (37.1 °C) (Oral)   Resp 16   Ht 5' 6\" (1.676 m)   Wt 185 lb (83.9 kg)   SpO2 95%   BMI 29.86 kg/m²   Temp  Av.2 °F (36.8 °C)  Min: 97.7 °F (36.5 °C)  Max: 98.7 °F (37.1 °C)  Constitutional: The patient is awake, alert, and oriented. In bed. Family at bedside. Skin: Warm and dry. No rashes were noted. HEENT: Round and reactive pupils. Moist mucous membranes. No ulcerations or thrush. Neck: Supple to movements. Chest: No use of accessory muscles to breathe. Symmetrical expansion. No wheezing, crackles or rhonchi. Cardiovascular: S1 and S2 are rhythmic and regular. No murmurs appreciated.  Pacer in place. Abdomen: Positive bowel sounds to auscultation. Benign to palpation. Extremities: No clubbing, no cyanosis, no edema. Lines: peripheral   Currie - light red urine     Laboratory and Tests Review:  Lab Results   Component Value Date    WBC 12.5 (H) 11/24/2022    WBC 18.1 (H) 11/23/2022    WBC 15.4 (H) 11/22/2022    HGB 8.4 (L) 11/24/2022    HCT 27.0 (L) 11/24/2022    MCV 95.4 11/24/2022     11/24/2022     Lab Results   Component Value Date    NEUTROABS 8.13 (H) 11/24/2022    NEUTROABS 14.03 (H) 11/23/2022    NEUTROABS 11.01 (H) 11/22/2022     No results found for: CRPHS  Lab Results   Component Value Date    ALT 21 11/21/2022    AST 26 11/21/2022    ALKPHOS 94 11/21/2022    BILITOT 0.3 11/21/2022     Lab Results   Component Value Date/Time     11/24/2022 04:38 AM    K 4.0 11/24/2022 04:38 AM    K 4.0 11/24/2022 04:38 AM     11/24/2022 04:38 AM    CO2 24 11/24/2022 04:38 AM    BUN 23 11/24/2022 04:38 AM    CREATININE 1.6 11/24/2022 04:38 AM    CREATININE 1.4 11/23/2022 03:20 AM    CREATININE 1.3 11/22/2022 03:45 AM    GFRAA >60 04/22/2019 10:56 AM    LABGLOM 42 11/24/2022 04:38 AM    GLUCOSE 128 11/24/2022 04:38 AM    PROT 6.5 11/21/2022 12:04 PM    LABALBU 3.8 11/21/2022 12:04 PM    CALCIUM 8.3 11/24/2022 04:38 AM    BILITOT 0.3 11/21/2022 12:04 PM    ALKPHOS 94 11/21/2022 12:04 PM    AST 26 11/21/2022 12:04 PM    ALT 21 11/21/2022 12:04 PM     No results found for: CRP  No results found for: [address]  Radiology:      Microbiology:   Urine Culture 11/21/22: >100K  Klebsiella pneumoniae    ASSESSMENT:  Complicated UTI:   Spontaneous hematuria from anticoagulation use: s/p cystoscopy with clot evacuation 11/22  Leucocytosis    PLAN:  Switch IV Zosyn to IV Rocephin 1g q24   Monitor labs  Will continue to follow     RENETTA Curran CNP  9:41 AM  11/24/2022       Pt seen and examined. Above discussed agree with advanced practice nurse. Labs, cultures, and radiographs reviewed.   Face to Face encounter occurred. Changes made as necessary.      Mary Shaw MD

## 2022-11-24 NOTE — PROGRESS NOTES
[facility] Hospitalist   Progress Note    Admitting Date and Time: 11/21/2022 11:40 AM  Admit Dx: Lyell Cease hematuria [R31.0]  Acute cystitis with hematuria [N30.01]  BPH with elevated PSA [N40.0, R97.20]    Subjective:    Patient was admitted with Gross hematuria [R31.0]  Acute cystitis with hematuria [N30.01]  BPH with elevated PSA [N40.0, R97.20].  Patient denies fever, chills, cp, sob, n/v.     cefTRIAXone (ROCEPHIN) IV  1,000 mg IntraVENous Q24H    atorvastatin  80 mg Oral Nightly    folic acid  2,010 mcg Oral Daily    [Held by provider] lisinopril  10 mg Oral BID    magnesium oxide  400 mg Oral Daily    metoprolol tartrate  25 mg Oral BID    pantoprazole  40 mg Oral QAM AC    sodium chloride flush  5-40 mL IntraVENous 2 times per day     oxyCODONE-acetaminophen, 1 tablet, Q4H PRN   Or  oxyCODONE-acetaminophen, 2 tablet, Q4H PRN  sodium chloride, , PRN  sodium chloride flush, 5-40 mL, PRN  sodium chloride, , PRN  ondansetron, 4 mg, Q8H PRN   Or  ondansetron, 4 mg, Q6H PRN  polyethylene glycol, 17 g, Daily PRN  acetaminophen, 650 mg, Q6H PRN   Or  acetaminophen, 650 mg, Q6H PRN  glucose, 4 tablet, PRN  dextrose bolus, 125 mL, PRN   Or  dextrose bolus, 250 mL, PRN  glucagon (rDNA), 1 mg, PRN  dextrose, , Continuous PRN         Objective:    BP (!) 111/53   Pulse 86   Temp 98.8 °F (37.1 °C) (Oral)   Resp 16   Ht 5' 6\" (1.676 m)   Wt 185 lb (83.9 kg)   SpO2 95%   BMI 29.86 kg/m²   Skin: warm and dry, no rash or erythema  Pulmonary/Chest: clear to auscultation bilaterally- no wheezes, rales or rhonchi, normal air movement, no respiratory distress  Cardiovascular: rhythm reg at rate of 88  Abdomen: soft, non-tender, non-distended, normal bowel sounds, no masses or organomegaly  Extremities: no cyanosis, no clubbing, and no edema      Recent Labs     11/22/22  0345 11/23/22  0320 11/24/22  0438    138 140   K 4.2 4.2  4.2 4.0  4.0    107 109*   CO2 21* 21* 24   BUN 18 18 23 CREATININE 1.3* 1.4* 1.6*   GLUCOSE 142* 138* 128*   CALCIUM 9.0 8.3* 8.3*       Recent Labs     11/22/22  0345 11/22/22  1438 11/23/22  0320 11/24/22  0438   WBC 15.4*  --  18.1* 12.5*   RBC 3.40*  --  3.18* 2.83*   HGB 9.9* 8.9* 9.4* 8.4*   HCT 33.1* 29.2* 30.6* 27.0*   MCV 97.4  --  96.2 95.4   MCH 29.1  --  29.6 29.7   MCHC 29.9*  --  30.7* 31.1*   RDW 12.8  --  13.1 13.3     --  321 304   MPV 9.1  --  8.9 8.8       CBC with Differential:    Lab Results   Component Value Date/Time    WBC 12.5 11/24/2022 04:38 AM    RBC 2.83 11/24/2022 04:38 AM    HGB 8.4 11/24/2022 04:38 AM    HCT 27.0 11/24/2022 04:38 AM     11/24/2022 04:38 AM    MCV 95.4 11/24/2022 04:38 AM    MCH 29.7 11/24/2022 04:38 AM    MCHC 31.1 11/24/2022 04:38 AM    RDW 13.3 11/24/2022 04:38 AM    LYMPHOPCT 25.6 11/24/2022 04:38 AM    MONOPCT 7.0 11/24/2022 04:38 AM    BASOPCT 0.2 11/24/2022 04:38 AM    MONOSABS 0.88 11/24/2022 04:38 AM    LYMPHSABS 3.20 11/24/2022 04:38 AM    EOSABS 0.21 11/24/2022 04:38 AM    BASOSABS 0.03 11/24/2022 04:38 AM     BMP:    Lab Results   Component Value Date/Time     11/24/2022 04:38 AM    K 4.0 11/24/2022 04:38 AM    K 4.0 11/24/2022 04:38 AM     11/24/2022 04:38 AM    CO2 24 11/24/2022 04:38 AM    BUN 23 11/24/2022 04:38 AM    LABALBU 3.8 11/21/2022 12:04 PM    CREATININE 1.6 11/24/2022 04:38 AM    CALCIUM 8.3 11/24/2022 04:38 AM    GFRAA >60 04/22/2019 10:56 AM    LABGLOM 42 11/24/2022 04:38 AM    GLUCOSE 128 11/24/2022 04:38 AM        Radiology:   Soila Pleva RETROGRADE PYELOGRAM W WO KUB   Final Result   Intraprocedural fluoroscopic spot images as above. See separate procedure   report for more information. CT ABDOMEN PELVIS W IV CONTRAST Additional Contrast? None   Final Result   Currie catheter in the bladder with decompressed bladder demonstrating   irregularly thickened wall, suspicious for underlying malignancy.   1.8 cm   cystic structure in in the posterolateral region of the bladder, suspicious   for bladder diverticulum or ureterocele. Urologic consultation and/or   cystoscopy evaluation recommended. Enlarged prostate gland. Multiple bilateral renal cysts. Constipation. Assessment:    Principal Problem:    Gross hematuria  Active Problems:    Urinary tract infection with hematuria    Anemia associated with acute blood loss    Bullous pemphigoid    Acidosis    Complicated UTI (urinary tract infection)    Acute kidney injury ([facility] [address]  Resolved Problems:    * No resolved hospital problems. *      Plan:  Hematuria s/p surg by urol incl turp.  Irrigation per urology  Complicated Uti continue abx  ID following  Anemia with acute blood loss component monitor and transfuse prn  Atrial fib continue med but hold anticoagulation  Acidosis monitor  Bullous pemphigoid monitor  Htn monitor bp and adjust meds as needed  Hyperlipidemia continue statin  Nichole Koo will give fluids and hold ace for now        Electronically signed by Dipika Ballard DO on 11/24/2022 at 4:49 PM

## 2022-11-24 NOTE — PLAN OF CARE

## 2022-11-24 NOTE — PROGRESS NOTES
INPATIENT CARDIOLOGY FOLLOW-UP    Name: Brett Chow    Age: 81 y.o. Date of Admission: 11/21/2022 11:40 AM    Date of Service: 11/24/2022    Chief Complaint: Follow-up for gross hematuria, coronary atherosclerosis, aortic valve disease, atrioventricular block, paroxysmal atrial fibrillation/flutter, acute kidney injury superimposed upon chronic kidney disease    Interim History: The patient presently relates no symptomatology and with no present clinical evidence of hypervolemia. His continuous bladder irrigation has recently been discontinued with a slight progression of his anemia and incomplete maintenance of intake and output limiting adequate assessment of his volume status. A further progressive increase of serum creatinine levels is noted. Review of Systems: The remainder of a complete multisystem review including consitutional, central nervous, respiratory, circulatory, gastrointestinal, genitourinary, endocrinologic, hematologic, musculoskeletal and psychiatric are negative.     Problem List:  Patient Active Problem List   Diagnosis    Mobitz II    Bradycardia    Chest pain    Coronary artery disease involving native coronary artery of native heart without angina pectoris    Complete heart block ([address]    Aortic valve disease    Type 2 diabetes mellitus without complication, without long-term current use of insulin ([address]    Pure hypercholesterolemia    Essential hypertension    Near syncope    Gross hematuria    Urinary tract infection with hematuria    Anemia associated with acute blood loss    Bullous pemphigoid    Acidosis    Complicated UTI (urinary tract infection)       Allergies:  No Known Allergies    Current Medications:  Current Facility-Administered Medications   Medication Dose Route Frequency Provider Last Rate Last Admin    oxyCODONE-acetaminophen (PERCOCET) 5-325 MG per tablet 1 tablet  1 tablet Oral Q4H PRN Suzan Jhaveri MD        Or    oxyCODONE-acetaminophen (PERCOCET) 5-325 MG per tablet 2 tablet  2 tablet Oral Q4H PRN Mariella Colvin MD        0.9 % sodium chloride infusion   IntraVENous PRN Lexie Colvin MD        piperacillin-tazobactam (ZOSYN) 3,375 mg in dextrose 5 % 50 mL IVPB extended infusion (mini-bag)  3,375 mg IntraVENous Q8H Shelby Saab MD   Stopped at 11/24/22 0629    atorvastatin (LIPITOR) tablet 80 mg  80 mg Oral Nightly Pollo Bahena PA-C   80 mg at 90/99/95 0122    folic acid (FOLVITE) tablet 1,000 mcg  1,000 mcg Oral Daily Stephanie Bahena PA-C   1,000 mcg at 11/23/22 0801    [Held by provider] lisinopril (PRINIVIL;ZESTRIL) tablet 10 mg  10 mg Oral BID Stephanie Bahena PA-C   10 mg at 11/23/22 2018    magnesium oxide (MAG-OX) tablet 400 mg  400 mg Oral Daily Stephanie Bahena PA-C   400 mg at 11/23/22 0801    metoprolol tartrate (LOPRESSOR) tablet 25 mg  25 mg Oral BID Stephanie Bahena PA-C   25 mg at 11/23/22 2018    pantoprazole (PROTONIX) tablet 40 mg  40 mg Oral QAM AC Pollo Bahena PA-C   40 mg at 11/24/22 7354    sodium chloride flush 0.9 % injection 5-40 mL  5-40 mL IntraVENous 2 times per day Stephanie Bahena PA-C   10 mL at 11/23/22 0801    sodium chloride flush 0.9 % injection 5-40 mL  5-40 mL IntraVENous PRN Pollo Bahena PA-C        0.9 % sodium chloride infusion   IntraVENous PRN Stephanie Bahena PA-C        ondansetron (ZOFRAN-ODT) disintegrating tablet 4 mg  4 mg Oral Q8H PRN Pollo Bahena PA-C        Or    ondansetron (ZOFRAN) injection 4 mg  4 mg IntraVENous Q6H PRN Pollo Bahena PA-C        polyethylene glycol (GLYCOLAX) packet 17 g  17 g Oral Daily PRN Pollo Bahena PA-C        acetaminophen (TYLENOL) tablet 650 mg  650 mg Oral Q6H PRN Stephanie Bahena PA-C   650 mg at 11/23/22 2019    Or    acetaminophen (TYLENOL) suppository 650 mg  650 mg Rectal Q6H PRN Pollo Bahena PA-C        glucose chewable tablet 16 g  4 tablet Oral PRN Pollo Bahena PA-C        dextrose bolus 10% 125 mL 125 mL IntraVENous PRN Mena Second SHARRON Bahena        Or    dextrose bolus 10% 250 mL  250 mL IntraVENous PRN Pollo Kd Bahena PA-C        glucagon (rDNA) injection 1 mg  1 mg SubCUTAneous PRN Pollo Kd Bahena, PA-C        dextrose 10 % infusion   IntraVENous Continuous PRN Pollo Yi Shruti, PA-C          sodium chloride      sodium chloride      dextrose         Physical Exam:  /63   Pulse 69   Temp 98.5 °F (36.9 °C) (Oral)   Resp 16   Ht 5' 6\" (1.676 m)   Wt 185 lb (83.9 kg)   SpO2 94%   BMI 29.86 kg/m²   Weight change: Wt Readings from Last 3 Encounters:   11/24/22 185 lb (83.9 kg)   05/12/22 180 lb (81.6 kg)   04/22/19 180 lb (81.6 kg)     The patient is awake, alert and in no discomfort or distress. No gross musculoskeletal deformity is present. No significant skin or nail changes are present. Gross examination of head, eyes, nose and throat are negative. Jugular venous pressure is normal and no carotid bruits are present. Normal respiratory effort is noted with no accessory muscle usage present. Lung fields are clear to ascultation. Cardiac examination is notable for a regular rate and rhythm with no palpable thrill. No gallop rhythm or cardiac murmur are identified. A benign abdominal examination is present with no masses or organomegaly. Intact pulses are present throughout all extremities and no peripheral edema is present. No focal neurologic deficits are present. Intake/Output:    Intake/Output Summary (Last 24 hours) at 11/24/2022 0643  Last data filed at 11/24/2022 0630  Gross per 24 hour   Intake 720 ml   Output 2600 ml   Net -1880 ml     I/O this shift:  In: -   Out: 1300 [Urine:1300]    Laboratory Tests:  Lab Results   Component Value Date    CREATININE 1.6 (H) 11/24/2022    BUN 23 11/24/2022     11/24/2022    K 4.0 11/24/2022    K 4.0 11/24/2022     (H) 11/24/2022    CO2 24 11/24/2022     No results for input(s): CKTOTAL, CKMB in the last 72 hours.     Invalid input(s): TROPONONI  No results found for: BNP  Lab Results   Component Value Date/Time    WBC 12.5 11/24/2022 04:38 AM    RBC 2.83 11/24/2022 04:38 AM    HGB 8.4 11/24/2022 04:38 AM    HCT 27.0 11/24/2022 04:38 AM    MCV 95.4 11/24/2022 04:38 AM    MCH 29.7 11/24/2022 04:38 AM    MCHC 31.1 11/24/2022 04:38 AM    RDW 13.3 11/24/2022 04:38 AM     11/24/2022 04:38 AM    MPV 8.8 11/24/2022 04:38 AM     Recent Labs     11/21/22  1204   ALKPHOS 94   ALT 21   AST 26   PROT 6.5   BILITOT 0.3   LABALBU 3.8     No results found for: MG  Lab Results   Component Value Date/Time    PROTIME 12.0 10/20/2018 08:00 AM    INR 1.1 10/20/2018 08:00 AM     No results found for: TSH  No components found for: CHLPL  No results found for: TRIG  No results found for: HDL  No results found for: [address]    Cardiac Tests: none available      ASSESSMENT / PLAN: On a clinical basis, the patient appears compensated from a cardiovascular standpoint with resolution of his hematuria and his condition discussed with the urology service. Continuous bladder irrigation has been discontinued with a somewhat progressive anemia potentially in part related to hemodilution with an incomplete maintenance of intake and output in the face of bladder irrigation limiting adequate assessment. He additionally has developed acute kidney injury with present withholding of his angiotensin-converting enzyme inhibitor and continued needs of careful monitoring of his volume status in addition to that of renal function electrolytes. To reduce risk of iatrogenic volume overload, intravenous fluids will be discontinued with ongoing needs of nutritional support to assist fluid mobilization and reducing risk of progressive debilitation.   Following discussion with the urology service, at least an additional 24 hours of withholding of anticoagulation will be advisable prior to resumption and at least on a short-term basis, with holding of antiplatelet therapy would be advisable. Continued aggressive risk factor modification of blood pressure and serum lipids will remain essential to reducing risk of future atherosclerotic development in addition to that of antibiotic prophylaxis at time of all dental interventions to reduce risk of bacterial endocarditis. Additional management will be further deferred to primary care and the urology service. Note: This report was completed utilizing computer voice recognition software. Every effort has been made to ensure accuracy, however; inadvertent computerized transcription errors may be present. Keesha Whalen.  Reina Jensen, [address] [facility]

## 2022-11-25 LAB
ANION GAP SERPL CALCULATED.3IONS-SCNC: 8 MMOL/L (ref 7–16)
BASOPHILS ABSOLUTE: 0.04 E9/L (ref 0–0.2)
BASOPHILS RELATIVE PERCENT: 0.3 % (ref 0–2)
BLOOD BANK DISPENSE STATUS: NORMAL
BLOOD BANK PRODUCT CODE: NORMAL
BPU ID: NORMAL
BUN BLDV-MCNC: 17 MG/DL (ref 6–23)
CALCIUM SERPL-MCNC: 8.3 MG/DL (ref 8.6–10.2)
CHLORIDE BLD-SCNC: 107 MMOL/L (ref 98–107)
CO2: 24 MMOL/L (ref 22–29)
CREAT SERPL-MCNC: 1.5 MG/DL (ref 0.7–1.2)
DESCRIPTION BLOOD BANK: NORMAL
EOSINOPHILS ABSOLUTE: 0.22 E9/L (ref 0.05–0.5)
EOSINOPHILS RELATIVE PERCENT: 1.8 % (ref 0–6)
GFR SERPL CREATININE-BSD FRML MDRD: 45 ML/MIN/1.73
GLUCOSE BLD-MCNC: 156 MG/DL (ref 74–99)
HCT VFR BLD CALC: 27 % (ref 37–54)
HCT VFR BLD CALC: 32.1 % (ref 37–54)
HEMOGLOBIN: 8.4 G/DL (ref 12.5–16.5)
HEMOGLOBIN: 9.8 G/DL (ref 12.5–16.5)
IMMATURE GRANULOCYTES #: 0.04 E9/L
IMMATURE GRANULOCYTES %: 0.3 % (ref 0–5)
LYMPHOCYTES ABSOLUTE: 2.57 E9/L (ref 1.5–4)
LYMPHOCYTES RELATIVE PERCENT: 20.9 % (ref 20–42)
MCH RBC QN AUTO: 30.1 PG (ref 26–35)
MCHC RBC AUTO-ENTMCNC: 31.1 % (ref 32–34.5)
MCV RBC AUTO: 96.8 FL (ref 80–99.9)
MONOCYTES ABSOLUTE: 0.9 E9/L (ref 0.1–0.95)
MONOCYTES RELATIVE PERCENT: 7.3 % (ref 2–12)
NEUTROPHILS ABSOLUTE: 8.51 E9/L (ref 1.8–7.3)
NEUTROPHILS RELATIVE PERCENT: 69.4 % (ref 43–80)
PDW BLD-RTO: 13.2 FL (ref 11.5–15)
PLATELET # BLD: 357 E9/L (ref 130–450)
PMV BLD AUTO: 8.6 FL (ref 7–12)
POTASSIUM SERPL-SCNC: 4 MMOL/L (ref 3.5–5)
RBC # BLD: 2.79 E12/L (ref 3.8–5.8)
SODIUM BLD-SCNC: 139 MMOL/L (ref 132–146)
WBC # BLD: 12.3 E9/L (ref 4.5–11.5)

## 2022-11-25 PROCEDURE — 6370000000 HC RX 637 (ALT 250 FOR IP): Performed by: UROLOGY

## 2022-11-25 PROCEDURE — 6360000002 HC RX W HCPCS: Performed by: UROLOGY

## 2022-11-25 PROCEDURE — 80048 BASIC METABOLIC PNL TOTAL CA: CPT

## 2022-11-25 PROCEDURE — 36430 TRANSFUSION BLD/BLD COMPNT: CPT

## 2022-11-25 PROCEDURE — 6370000000 HC RX 637 (ALT 250 FOR IP): Performed by: PHYSICIAN ASSISTANT

## 2022-11-25 PROCEDURE — 2580000003 HC RX 258: Performed by: PHYSICIAN ASSISTANT

## 2022-11-25 PROCEDURE — 6360000002 HC RX W HCPCS

## 2022-11-25 PROCEDURE — 36415 COLL VENOUS BLD VENIPUNCTURE: CPT

## 2022-11-25 PROCEDURE — 99233 SBSQ HOSP IP/OBS HIGH 50: CPT | Performed by: INTERNAL MEDICINE

## 2022-11-25 PROCEDURE — 85025 COMPLETE CBC W/AUTO DIFF WBC: CPT

## 2022-11-25 PROCEDURE — 2580000003 HC RX 258

## 2022-11-25 PROCEDURE — 1200000000 HC SEMI PRIVATE

## 2022-11-25 PROCEDURE — 85014 HEMATOCRIT: CPT

## 2022-11-25 PROCEDURE — 85018 HEMOGLOBIN: CPT

## 2022-11-25 PROCEDURE — 99232 SBSQ HOSP IP/OBS MODERATE 35: CPT | Performed by: INTERNAL MEDICINE

## 2022-11-25 RX ORDER — SODIUM CHLORIDE 9 MG/ML
INJECTION, SOLUTION INTRAVENOUS PRN
Status: DISCONTINUED | OUTPATIENT
Start: 2022-11-25 | End: 2022-11-28 | Stop reason: HOSPADM

## 2022-11-25 RX ORDER — FUROSEMIDE 10 MG/ML
10 INJECTION INTRAMUSCULAR; INTRAVENOUS ONCE
Status: COMPLETED | OUTPATIENT
Start: 2022-11-25 | End: 2022-11-25

## 2022-11-25 RX ADMIN — FOLIC ACID 1000 MCG: 1 TABLET ORAL at 08:39

## 2022-11-25 RX ADMIN — METOPROLOL TARTRATE 25 MG: 25 TABLET, FILM COATED ORAL at 08:39

## 2022-11-25 RX ADMIN — Medication 400 MG: at 08:39

## 2022-11-25 RX ADMIN — OXYCODONE AND ACETAMINOPHEN 1 TABLET: 5; 325 TABLET ORAL at 21:27

## 2022-11-25 RX ADMIN — Medication 10 ML: at 21:29

## 2022-11-25 RX ADMIN — METOPROLOL TARTRATE 25 MG: 25 TABLET, FILM COATED ORAL at 21:27

## 2022-11-25 RX ADMIN — OXYCODONE AND ACETAMINOPHEN 1 TABLET: 5; 325 TABLET ORAL at 15:27

## 2022-11-25 RX ADMIN — WATER 1000 MG: 1 INJECTION INTRAMUSCULAR; INTRAVENOUS; SUBCUTANEOUS at 14:11

## 2022-11-25 RX ADMIN — FUROSEMIDE 10 MG: 10 INJECTION, SOLUTION INTRAVENOUS at 14:30

## 2022-11-25 RX ADMIN — ATORVASTATIN CALCIUM 80 MG: 40 TABLET, FILM COATED ORAL at 21:27

## 2022-11-25 RX ADMIN — PANTOPRAZOLE SODIUM 40 MG: 40 TABLET, DELAYED RELEASE ORAL at 04:36

## 2022-11-25 RX ADMIN — OXYCODONE AND ACETAMINOPHEN 1 TABLET: 5; 325 TABLET ORAL at 06:21

## 2022-11-25 RX ADMIN — OXYCODONE AND ACETAMINOPHEN 1 TABLET: 5; 325 TABLET ORAL at 10:12

## 2022-11-25 ASSESSMENT — PAIN DESCRIPTION - LOCATION
LOCATION: ABDOMEN
LOCATION: ABDOMEN
LOCATION: PENIS
LOCATION: ABDOMEN

## 2022-11-25 ASSESSMENT — PAIN SCALES - GENERAL
PAINLEVEL_OUTOF10: 0
PAINLEVEL_OUTOF10: 0
PAINLEVEL_OUTOF10: 4
PAINLEVEL_OUTOF10: 2

## 2022-11-25 ASSESSMENT — PAIN DESCRIPTION - DESCRIPTORS
DESCRIPTORS: SPASM;SORE
DESCRIPTORS: ACHING;DISCOMFORT
DESCRIPTORS: ACHING;SPASM;DISCOMFORT

## 2022-11-25 ASSESSMENT — PAIN DESCRIPTION - ORIENTATION
ORIENTATION: LOWER

## 2022-11-25 ASSESSMENT — PAIN - FUNCTIONAL ASSESSMENT: PAIN_FUNCTIONAL_ASSESSMENT: PREVENTS OR INTERFERES WITH ALL ACTIVE AND SOME PASSIVE ACTIVITIES

## 2022-11-25 NOTE — PROGRESS NOTES
Mikaela Padilla Hospitalist   Progress Note    Admitting Date and Time: 11/21/2022 11:40 AM  Admit Dx: Honorio Needs hematuria [R31.0]  Acute cystitis with hematuria [N30.01]  BPH with elevated PSA [N40.0, R97.20]    Subjective:    Patient was admitted with Gross hematuria [R31.0]  Acute cystitis with hematuria [N30.01]  BPH with elevated PSA [N40.0, R97.20].  Patient denies fever, chills, cp, sob, n/v.     furosemide  10 mg IntraVENous Once    cefTRIAXone (ROCEPHIN) IV  1,000 mg IntraVENous Q24H    atorvastatin  80 mg Oral Nightly    folic acid  1,480 mcg Oral Daily    magnesium oxide  400 mg Oral Daily    metoprolol tartrate  25 mg Oral BID    pantoprazole  40 mg Oral QAM AC    sodium chloride flush  5-40 mL IntraVENous 2 times per day     sodium chloride, , PRN  oxyCODONE-acetaminophen, 1 tablet, Q4H PRN   Or  oxyCODONE-acetaminophen, 2 tablet, Q4H PRN  sodium chloride, , PRN  sodium chloride flush, 5-40 mL, PRN  sodium chloride, , PRN  ondansetron, 4 mg, Q8H PRN   Or  ondansetron, 4 mg, Q6H PRN  polyethylene glycol, 17 g, Daily PRN  acetaminophen, 650 mg, Q6H PRN   Or  acetaminophen, 650 mg, Q6H PRN  glucose, 4 tablet, PRN  dextrose bolus, 125 mL, PRN   Or  dextrose bolus, 250 mL, PRN  glucagon (rDNA), 1 mg, PRN  dextrose, , Continuous PRN         Objective:    /60   Pulse 68   Temp 98.7 °F (37.1 °C) (Oral)   Resp 18   Ht 5' 6\" (1.676 m)   Wt 189 lb (85.7 kg)   SpO2 98%   BMI 30.51 kg/m²   Skin: warm and dry, no rash or erythema  Pulmonary/Chest: clear to auscultation bilaterally- no wheezes, rales or rhonchi, normal air movement, no respiratory distress  Cardiovascular: rhythm reg at rate of 70  Abdomen: soft, non-tender, non-distended, normal bowel sounds, no masses or organomegaly  Extremities: no cyanosis, no clubbing, and no edema      Recent Labs     11/23/22  0320 11/24/22  0438 11/25/22  0938    140 139   K 4.2  4.2 4.0  4.0 4.0    109* 107   CO2 21* 24 24   BUN 18 23 17 CREATININE 1.4* 1.6* 1.5*   GLUCOSE 138* 128* 156*   CALCIUM 8.3* 8.3* 8.3*       Recent Labs     11/23/22  0320 11/24/22  0438 11/25/22  0938   WBC 18.1* 12.5* 12.3*   RBC 3.18* 2.83* 2.79*   HGB 9.4* 8.4* 8.4*   HCT 30.6* 27.0* 27.0*   MCV 96.2 95.4 96.8   MCH 29.6 29.7 30.1   MCHC 30.7* 31.1* 31.1*   RDW 13.1 13.3 13.2    304 357   MPV 8.9 8.8 8.6       CBC with Differential:    Lab Results   Component Value Date/Time    WBC 12.3 11/25/2022 09:38 AM    RBC 2.79 11/25/2022 09:38 AM    HGB 8.4 11/25/2022 09:38 AM    HCT 27.0 11/25/2022 09:38 AM     11/25/2022 09:38 AM    MCV 96.8 11/25/2022 09:38 AM    MCH 30.1 11/25/2022 09:38 AM    MCHC 31.1 11/25/2022 09:38 AM    RDW 13.2 11/25/2022 09:38 AM    LYMPHOPCT 20.9 11/25/2022 09:38 AM    MONOPCT 7.3 11/25/2022 09:38 AM    BASOPCT 0.3 11/25/2022 09:38 AM    MONOSABS 0.90 11/25/2022 09:38 AM    LYMPHSABS 2.57 11/25/2022 09:38 AM    EOSABS 0.22 11/25/2022 09:38 AM    BASOSABS 0.04 11/25/2022 09:38 AM     BMP:    Lab Results   Component Value Date/Time     11/25/2022 09:38 AM    K 4.0 11/25/2022 09:38 AM    K 4.0 11/24/2022 04:38 AM     11/25/2022 09:38 AM    CO2 24 11/25/2022 09:38 AM    BUN 17 11/25/2022 09:38 AM    LABALBU 3.8 11/21/2022 12:04 PM    CREATININE 1.5 11/25/2022 09:38 AM    CALCIUM 8.3 11/25/2022 09:38 AM    GFRAA >60 04/22/2019 10:56 AM    LABGLOM 45 11/25/2022 09:38 AM    GLUCOSE 156 11/25/2022 09:38 AM        Radiology:   [place] RETROGRADE PYELOGRAM W WO KUB   Final Result   Intraprocedural fluoroscopic spot images as above. See separate procedure   report for more information. CT ABDOMEN PELVIS W IV CONTRAST Additional Contrast? None   Final Result   Currie catheter in the bladder with decompressed bladder demonstrating   irregularly thickened wall, suspicious for underlying malignancy. 1.8 cm   cystic structure in in the posterolateral region of the bladder, suspicious   for bladder diverticulum or ureterocele. Urologic consultation and/or   cystoscopy evaluation recommended. Enlarged prostate gland. Multiple bilateral renal cysts. Constipation. Assessment:    Principal Problem:    Gross hematuria  Active Problems:    Urinary tract infection with hematuria    Anemia associated with acute blood loss    Bullous pemphigoid    Acidosis    Complicated UTI (urinary tract infection)    Acute kidney injury ([address]    Primary hypertension  Resolved Problems:    * No resolved hospital problems. *      Plan:  Hematuria s/p surg by urol incl turp. Irrigation per urology  Complicated Uti continue abx  ID following  Anemia with acute blood loss component monitor and transfuse prn  Atrial fib continue med but hold anticoagulation  Acidosis monitor  Bullous pemphigoid monitor  Htn monitor bp and adjust meds as needed  Hyperlipidemia continue statin  Adam   hold ace for now    With blood administration today, will stop iv fluids for now.      Electronically signed by Halie Zepeda DO on 11/25/2022 at 11:30 AM

## 2022-11-25 NOTE — PROGRESS NOTES
The patient remains compensated from a cardiovascular standpoint with persistent hematuria noted and upon discussion with the urology service inclusive of clots requiring flushing of his urinary catheter system. On this basis, continued withholding of anticoagulation appears advisable until appropriate from a urologic standpoint with ongoing needs of monitoring inclusive of hemoglobin levels. He remains clinically euvolemic with need of careful monitoring with present resumption of fluid administration by primary care in the face of his noted acute kidney injury with repeat laboratory assessment pending. His blood pressure has remained essentially stable with ongoing need of monitoring and potential alternative antihypertensive therapy at least on a temporary basis until stabilization of his renal function. Additional management of his urinary tract infection will be deferred to primary care and the urology service. Presently, we will further evaluate him during hospitalization should additional cardiovascular difficulties or concerns arise with management deferred to primary care and the urology service and upon discharge return of his cardiovascular care to his primary cardiologist and electrophysiologist at the [facility].     At time of evaluation his condition was discussed with he and his wife and coordinated with the urology service with the duration of discussion counseling in conjunction with his assessment exceeding 35 minutes

## 2022-11-25 NOTE — PROGRESS NOTES
[address] [place] [address] [facility]  Progress Note    SUBJECTIVE:  Chief Complaint   Patient presents with    Dysuria     X 1 wk, has been seeing urology and was self cathing     Urinary Frequency     Patient is tolerating medications. No reported adverse drug reactions. No nausea, vomiting, diarrhea. In bed, wife at bedside  CBI was restarted, going to be getting a unit of blood per urology  Feeling okay  Afebrile     Review of systems:  As stated above in the chief complaint, otherwise negative. Medications:  Scheduled Meds:   cefTRIAXone (ROCEPHIN) IV  1,000 mg IntraVENous Q24H    atorvastatin  80 mg Oral Nightly    folic acid  3,381 mcg Oral Daily    magnesium oxide  400 mg Oral Daily    metoprolol tartrate  25 mg Oral BID    pantoprazole  40 mg Oral QAM AC    sodium chloride flush  5-40 mL IntraVENous 2 times per day     Continuous Infusions:   sodium chloride 50 mL/hr at 22 1715    sodium chloride      sodium chloride      dextrose       PRN Meds:oxyCODONE-acetaminophen **OR** oxyCODONE-acetaminophen, sodium chloride, sodium chloride flush, sodium chloride, ondansetron **OR** ondansetron, polyethylene glycol, acetaminophen **OR** acetaminophen, glucose, dextrose bolus **OR** dextrose bolus, glucagon (rDNA), dextrose    OBJECTIVE:  BP (!) 140/63   Pulse 69   Temp 99 °F (37.2 °C) (Oral)   Resp 18   Ht 5' 6\" (1.676 m)   Wt 189 lb (85.7 kg)   SpO2 96%   BMI 30.51 kg/m²   Temp  Av.7 °F (37.1 °C)  Min: 98.4 °F (36.9 °C)  Max: 99 °F (37.2 °C)  Constitutional: The patient is awake, alert, and oriented. In bed. Wife at bedside. Skin: Warm and dry. No rashes were noted. HEENT: Round and reactive pupils. Moist mucous membranes. No ulcerations or thrush. Neck: Supple to movements. Chest: No use of accessory muscles to breathe. Symmetrical expansion. No wheezing, crackles or rhonchi. Cardiovascular: S1 and S2 are rhythmic and regular. No murmurs appreciated.  Pacer in place.   Abdomen: Positive bowel sounds to auscultation. Benign to palpation. Extremities: No clubbing, no cyanosis, no edema. Lines: peripheral   Currie - pink, CBI running     Laboratory and Tests Review:  Lab Results   Component Value Date    WBC 12.5 (H) 11/24/2022    WBC 18.1 (H) 11/23/2022    WBC 15.4 (H) 11/22/2022    HGB 8.4 (L) 11/24/2022    HCT 27.0 (L) 11/24/2022    MCV 95.4 11/24/2022     11/24/2022     Lab Results   Component Value Date    NEUTROABS 8.13 (H) 11/24/2022    NEUTROABS 14.03 (H) 11/23/2022    NEUTROABS 11.01 (H) 11/22/2022     No results found for: CRPHS  Lab Results   Component Value Date    ALT 21 11/21/2022    AST 26 11/21/2022    ALKPHOS 94 11/21/2022    BILITOT 0.3 11/21/2022     Lab Results   Component Value Date/Time     11/24/2022 04:38 AM    K 4.0 11/24/2022 04:38 AM    K 4.0 11/24/2022 04:38 AM     11/24/2022 04:38 AM    CO2 24 11/24/2022 04:38 AM    BUN 23 11/24/2022 04:38 AM    CREATININE 1.6 11/24/2022 04:38 AM    CREATININE 1.4 11/23/2022 03:20 AM    CREATININE 1.3 11/22/2022 03:45 AM    GFRAA >60 04/22/2019 10:56 AM    LABGLOM 42 11/24/2022 04:38 AM    GLUCOSE 128 11/24/2022 04:38 AM    PROT 6.5 11/21/2022 12:04 PM    LABALBU 3.8 11/21/2022 12:04 PM    CALCIUM 8.3 11/24/2022 04:38 AM    BILITOT 0.3 11/21/2022 12:04 PM    ALKPHOS 94 11/21/2022 12:04 PM    AST 26 11/21/2022 12:04 PM    ALT 21 11/21/2022 12:04 PM     No results found for: CRP  No results found for: [address]  Radiology:      Microbiology:   Urine Culture 11/21/22: >100K  Klebsiella pneumoniae    ASSESSMENT:  Complicated UTI:   Spontaneous hematuria from anticoagulation use: s/p cystoscopy with clot evacuation 11/22  Leucocytosis    PLAN:  Continue IV Rocephin 1g q24 while inpatient - will switch to orals for discharge   Urology following: CBI restarted  Monitor labs  Will continue to follow     RENETTA Garcia CNP  9:40 AM  11/25/2022     Pt seen and examined.  Above discussed agree with advanced practice nurse. Labs, cultures, and radiographs reviewed. Face to Face encounter occurred. Changes made as necessary.      Rhiannon Cloud MD

## 2022-11-25 NOTE — PROGRESS NOTES
1910: Currie irrigated no clots removed at this time    1715: Currie irrigated per order. 4 very large clots removed    1515: Currie irrigated no clots removed at this time.

## 2022-11-25 NOTE — PROGRESS NOTES
SUBJECTIVE:    Afebrile  Still having some bleeding with clots  Multiple clots irrigated today  Hb 8.4 yesterday  Pt still uncomfortable at ttimes    OBJECTIVE:    BP (!) 119/56   Pulse 69   Temp 98.6 °F (37 °C) (Oral)   Resp 16   Ht 5' 6\" (1.676 m)   Wt 189 lb (85.7 kg)   SpO2 96%   BMI 30.51 kg/m²     PHYSICAL EXAMINATION:  Skin: dry, without rashes  Respirations: non-labored, intact  Abdomen: soft, non-tender, non-distended      Lab Results   Component Value Date    WBC 12.5 (H) 11/24/2022    HGB 8.4 (L) 11/24/2022    HCT 27.0 (L) 11/24/2022    MCV 95.4 11/24/2022     11/24/2022       Lab Results   Component Value Date    CREATININE 1.6 (H) 11/24/2022       No results found for: PSA    REVIEW OF SYSTEMS:    CONSTITUTIONAL: negative  HEENT: negative  HEMATOLOGIC: negative  ENDOCRINE: negative  RESPIRATORY: negative  CV: negative  GI: negative  NEURO: negative  ORTHOPEDICS: negative  PSYCHIATRIC: negative  : as above    PAST FAMILY HISTORY:  No family history on file.   PAST SOCIAL HISTORY:    Social History     Socioeconomic History    Marital status:    Tobacco Use    Smoking status: Former    Smokeless tobacco: Former   Substance and Sexual Activity    Alcohol use: Yes     Comment: RARELY     Drug use: No       Scheduled Meds:   cefTRIAXone (ROCEPHIN) IV  1,000 mg IntraVENous Q24H    atorvastatin  80 mg Oral Nightly    folic acid  7,274 mcg Oral Daily    magnesium oxide  400 mg Oral Daily    metoprolol tartrate  25 mg Oral BID    pantoprazole  40 mg Oral QAM AC    sodium chloride flush  5-40 mL IntraVENous 2 times per day     Continuous Infusions:   sodium chloride 50 mL/hr at 11/24/22 1715    sodium chloride      sodium chloride      dextrose       PRN Meds:.oxyCODONE-acetaminophen **OR** oxyCODONE-acetaminophen, sodium chloride, sodium chloride flush, sodium chloride, ondansetron **OR** ondansetron, polyethylene glycol, acetaminophen **OR** acetaminophen, glucose, dextrose bolus **OR** dextrose bolus, glucagon (rDNA), dextrose    ASSESSMENT:      Patient Active Problem List   Diagnosis    Mobitz II    Bradycardia    Chest pain    Coronary artery disease involving native coronary artery of native heart without angina pectoris    Complete heart block (HCC)    Aortic valve disease    Type 2 diabetes mellitus without complication, without long-term current use of insulin (HCC)    Pure hypercholesterolemia    Essential hypertension    Near syncope    Gross hematuria    Urinary tract infection with hematuria    Anemia associated with acute blood loss    Bullous pemphigoid    Acidosis    Complicated UTI (urinary tract infection)    Acute kidney injury ([facility] [address]    Primary hypertension       PLAN:      Burnie Lanes, MD, M.D.  11/25/2022  6:49 AM                                                                              SUBJECTIVE:      OBJECTIVE:    BP (!) 119/56   Pulse 69   Temp 98.6 °F (37 °C) (Oral)   Resp 16   Ht 5' 6\" (1.676 m)   Wt 189 lb (85.7 kg)   SpO2 96%   BMI 30.51 kg/m²     PHYSICAL EXAMINATION:  Skin: dry, without rashes  Respirations: non-labored, intact  Abdomen: soft, non-tender, non-distended      Lab Results   Component Value Date    WBC 12.5 (H) 11/24/2022    HGB 8.4 (L) 11/24/2022    HCT 27.0 (L) 11/24/2022    MCV 95.4 11/24/2022     11/24/2022       Lab Results   Component Value Date    CREATININE 1.6 (H) 11/24/2022       No results found for: PSA    REVIEW OF SYSTEMS:    CONSTITUTIONAL: negative  HEENT: negative  HEMATOLOGIC: negative  ENDOCRINE: negative  RESPIRATORY: negative  CV: negative  GI: negative  NEURO: negative  ORTHOPEDICS: negative  PSYCHIATRIC: negative  : as above    PAST FAMILY HISTORY:  No family history on file.   PAST SOCIAL HISTORY:    Social History     Socioeconomic History    Marital status:    Tobacco Use    Smoking status: Former    Smokeless tobacco: Former Substance and Sexual Activity    Alcohol use: Yes     Comment: RARELY     Drug use: No       Scheduled Meds:   cefTRIAXone (ROCEPHIN) IV  1,000 mg IntraVENous Q24H    atorvastatin  80 mg Oral Nightly    folic acid  7,974 mcg Oral Daily    magnesium oxide  400 mg Oral Daily    metoprolol tartrate  25 mg Oral BID    pantoprazole  40 mg Oral QAM AC    sodium chloride flush  5-40 mL IntraVENous 2 times per day     Continuous Infusions:   sodium chloride 50 mL/hr at 11/24/22 1715    sodium chloride      sodium chloride      dextrose       PRN Meds:.oxyCODONE-acetaminophen **OR** oxyCODONE-acetaminophen, sodium chloride, sodium chloride flush, sodium chloride, ondansetron **OR** ondansetron, polyethylene glycol, acetaminophen **OR** acetaminophen, glucose, dextrose bolus **OR** dextrose bolus, glucagon (rDNA), dextrose    ASSESSMENT:      Patient Active Problem List   Diagnosis    Mobitz II    Bradycardia    Chest pain    Coronary artery disease involving native coronary artery of native heart without angina pectoris    Complete heart block (HCC)    Aortic valve disease    Type 2 diabetes mellitus without complication, without long-term current use of insulin (HCC)    Pure hypercholesterolemia    Essential hypertension    Near syncope    Gross hematuria    Urinary tract infection with hematuria    Anemia associated with acute blood loss    Bullous pemphigoid    Acidosis    Complicated UTI (urinary tract infection)    Acute kidney injury ([facility] [address]    Primary hypertension       PLAN:  Will need to continue to hold eliwuis  Will transfuse in anticipation of further bleedanabelang  Lyle Rich MD, MVINNY.  11/25/2022  6:49 AM

## 2022-11-26 LAB
ANION GAP SERPL CALCULATED.3IONS-SCNC: 8 MMOL/L (ref 7–16)
BASOPHILS ABSOLUTE: 0.05 E9/L (ref 0–0.2)
BASOPHILS RELATIVE PERCENT: 0.5 % (ref 0–2)
BUN BLDV-MCNC: 17 MG/DL (ref 6–23)
CALCIUM SERPL-MCNC: 8.2 MG/DL (ref 8.6–10.2)
CHLORIDE BLD-SCNC: 108 MMOL/L (ref 98–107)
CO2: 22 MMOL/L (ref 22–29)
CREAT SERPL-MCNC: 1.3 MG/DL (ref 0.7–1.2)
EOSINOPHILS ABSOLUTE: 0.29 E9/L (ref 0.05–0.5)
EOSINOPHILS RELATIVE PERCENT: 2.7 % (ref 0–6)
GFR SERPL CREATININE-BSD FRML MDRD: 53 ML/MIN/1.73
GLUCOSE BLD-MCNC: 124 MG/DL (ref 74–99)
HCT VFR BLD CALC: 29.7 % (ref 37–54)
HEMOGLOBIN: 9.1 G/DL (ref 12.5–16.5)
IMMATURE GRANULOCYTES #: 0.03 E9/L
IMMATURE GRANULOCYTES %: 0.3 % (ref 0–5)
LYMPHOCYTES ABSOLUTE: 3.46 E9/L (ref 1.5–4)
LYMPHOCYTES RELATIVE PERCENT: 32 % (ref 20–42)
MCH RBC QN AUTO: 28.8 PG (ref 26–35)
MCHC RBC AUTO-ENTMCNC: 30.6 % (ref 32–34.5)
MCV RBC AUTO: 94 FL (ref 80–99.9)
MONOCYTES ABSOLUTE: 0.95 E9/L (ref 0.1–0.95)
MONOCYTES RELATIVE PERCENT: 8.8 % (ref 2–12)
NEUTROPHILS ABSOLUTE: 6.03 E9/L (ref 1.8–7.3)
NEUTROPHILS RELATIVE PERCENT: 55.7 % (ref 43–80)
PDW BLD-RTO: 13.8 FL (ref 11.5–15)
PLATELET # BLD: 369 E9/L (ref 130–450)
PMV BLD AUTO: 8.7 FL (ref 7–12)
POTASSIUM SERPL-SCNC: 4.1 MMOL/L (ref 3.5–5)
RBC # BLD: 3.16 E12/L (ref 3.8–5.8)
SODIUM BLD-SCNC: 138 MMOL/L (ref 132–146)
WBC # BLD: 10.8 E9/L (ref 4.5–11.5)

## 2022-11-26 PROCEDURE — 6370000000 HC RX 637 (ALT 250 FOR IP): Performed by: UROLOGY

## 2022-11-26 PROCEDURE — 36415 COLL VENOUS BLD VENIPUNCTURE: CPT

## 2022-11-26 PROCEDURE — 85025 COMPLETE CBC W/AUTO DIFF WBC: CPT

## 2022-11-26 PROCEDURE — 2580000003 HC RX 258: Performed by: PHYSICIAN ASSISTANT

## 2022-11-26 PROCEDURE — 2580000003 HC RX 258

## 2022-11-26 PROCEDURE — 99232 SBSQ HOSP IP/OBS MODERATE 35: CPT | Performed by: INTERNAL MEDICINE

## 2022-11-26 PROCEDURE — 80048 BASIC METABOLIC PNL TOTAL CA: CPT

## 2022-11-26 PROCEDURE — 6370000000 HC RX 637 (ALT 250 FOR IP): Performed by: PHYSICIAN ASSISTANT

## 2022-11-26 PROCEDURE — 1200000000 HC SEMI PRIVATE

## 2022-11-26 PROCEDURE — 51700 IRRIGATION OF BLADDER: CPT

## 2022-11-26 PROCEDURE — 6360000002 HC RX W HCPCS

## 2022-11-26 RX ADMIN — FOLIC ACID 1000 MCG: 1 TABLET ORAL at 08:32

## 2022-11-26 RX ADMIN — Medication 10 ML: at 08:32

## 2022-11-26 RX ADMIN — WATER 1000 MG: 1 INJECTION INTRAMUSCULAR; INTRAVENOUS; SUBCUTANEOUS at 13:26

## 2022-11-26 RX ADMIN — ATORVASTATIN CALCIUM 80 MG: 40 TABLET, FILM COATED ORAL at 21:59

## 2022-11-26 RX ADMIN — Medication 10 ML: at 22:00

## 2022-11-26 RX ADMIN — OXYCODONE AND ACETAMINOPHEN 1 TABLET: 5; 325 TABLET ORAL at 22:02

## 2022-11-26 RX ADMIN — PANTOPRAZOLE SODIUM 40 MG: 40 TABLET, DELAYED RELEASE ORAL at 05:53

## 2022-11-26 RX ADMIN — Medication 400 MG: at 08:32

## 2022-11-26 RX ADMIN — METOPROLOL TARTRATE 25 MG: 25 TABLET, FILM COATED ORAL at 21:59

## 2022-11-26 RX ADMIN — METOPROLOL TARTRATE 25 MG: 25 TABLET, FILM COATED ORAL at 08:32

## 2022-11-26 RX ADMIN — Medication 10 ML: at 13:27

## 2022-11-26 ASSESSMENT — PAIN DESCRIPTION - DESCRIPTORS: DESCRIPTORS: ACHING

## 2022-11-26 ASSESSMENT — PAIN DESCRIPTION - PAIN TYPE: TYPE: ACUTE PAIN

## 2022-11-26 ASSESSMENT — PAIN DESCRIPTION - ORIENTATION: ORIENTATION: UPPER

## 2022-11-26 ASSESSMENT — PAIN DESCRIPTION - ONSET: ONSET: GRADUAL

## 2022-11-26 ASSESSMENT — PAIN DESCRIPTION - LOCATION: LOCATION: HEAD;GENERALIZED

## 2022-11-26 ASSESSMENT — PAIN SCALES - GENERAL
PAINLEVEL_OUTOF10: 7
PAINLEVEL_OUTOF10: 0
PAINLEVEL_OUTOF10: 0

## 2022-11-26 NOTE — PROGRESS NOTES
Manually irrigated catheter. No clots removed. CBI running light pink. No complaints of pain. Patient tolerated well.   Electronically signed by Michelle Silver RN on 11/26/2022 at 8:30 AM

## 2022-11-26 NOTE — PROGRESS NOTES
Manually irrigated catheter. No clots removed.  CBI running light pink    Electronically signed by Radha Heredia RN on 11/26/2022 at 6:04 AM

## 2022-11-26 NOTE — PROGRESS NOTES
11/26/2022 10:56 AM  Salina Peacock  [id number]    Subjective: Wife present  Moran with jem urine   No complaints     Review of Systems  Constitutional: No fever or chills   Respiratory: negative for cough and hemoptysis  Cardiovascular: negative for chest pain and dyspnea  Gastrointestinal: negative for abdominal pain, diarrhea, nausea and vomiting   : See above  Derm: negative for rash and skin lesion(s)  Neurological: negative for seizures and tremors  Musculoskeletal: Negative    Psychiatric: Negative   All other reviews are negative      Scheduled Meds:   cefTRIAXone (ROCEPHIN) IV  1,000 mg IntraVENous Q24H    atorvastatin  80 mg Oral Nightly    folic acid  9,142 mcg Oral Daily    magnesium oxide  400 mg Oral Daily    metoprolol tartrate  25 mg Oral BID    pantoprazole  40 mg Oral QAM AC    sodium chloride flush  5-40 mL IntraVENous 2 times per day       Objective:  Vitals:    11/26/22 0823   BP: 134/65   Pulse: 71   Resp: 16   Temp: 98.6 °F (37 °C)   SpO2: 96%         Allergies: Patient has no known allergies.     General Appearance: alert and oriented to person, place and time and in no acute distress  Skin: no rash or erythema  Head: normocephalic and atraumatic  Pulmonary/Chest: normal air movement, no respiratory distress  Abdomen: soft, non-tender, non-distended  Genitourinary: moran with jem urine with CBI at slow rate   Extremities: no cyanosis, clubbing or edema         Labs:     Recent Labs     11/26/22  0440      K 4.1   *   CO2 22   BUN 17   CREATININE 1.3*   GLUCOSE 124*   CALCIUM 8.2*       Lab Results   Component Value Date/Time    HGB 9.1 11/26/2022 04:40 AM    HCT 29.7 11/26/2022 04:40 AM       No results found for: PSA      Assessment/Plan:  POD#4 cystoscopy, clot evacuation, attempted retrograde pyelogram, transurethral resection of the prostate         Hand irrigated the moran catheter, no clots, urine jem   Cont the CBI today, attempt to wean   Manually irrigate every 4 hours and PRN gross hematuria  Cont to watch the hemoglobin  Transfusions per primary   Antibiotics per pID   Cont the moran   Will follow     RENETTA Velarde - CNP   [facility]  Urology    Agree with above  Seen and examined  Agree with the plan and treatment    [employer], DO

## 2022-11-26 NOTE — PROGRESS NOTES
[address] [place] [address] [facility]  Progress Note    SUBJECTIVE:  Chief Complaint   Patient presents with    Dysuria     X 1 wk, has been seeing urology and was self cathing     Urinary Frequency     Patient is tolerating medications. No reported adverse drug reactions. No nausea, vomiting, diarrhea. In bed, wife at bedside  CBI running, feeling better   Afebrile     Review of systems:  As stated above in the chief complaint, otherwise negative. Medications:  Scheduled Meds:   cefTRIAXone (ROCEPHIN) IV  1,000 mg IntraVENous Q24H    atorvastatin  80 mg Oral Nightly    folic acid  7,011 mcg Oral Daily    magnesium oxide  400 mg Oral Daily    metoprolol tartrate  25 mg Oral BID    pantoprazole  40 mg Oral QAM AC    sodium chloride flush  5-40 mL IntraVENous 2 times per day     Continuous Infusions:   sodium chloride      sodium chloride      sodium chloride      dextrose       PRN Meds:sodium chloride, oxyCODONE-acetaminophen **OR** oxyCODONE-acetaminophen, sodium chloride, sodium chloride flush, sodium chloride, ondansetron **OR** ondansetron, polyethylene glycol, acetaminophen **OR** acetaminophen, glucose, dextrose bolus **OR** dextrose bolus, glucagon (rDNA), dextrose    OBJECTIVE:  /65   Pulse 71   Temp 98.6 °F (37 °C) (Oral)   Resp 16   Ht 5' 6\" (1.676 m)   Wt 189 lb (85.7 kg)   SpO2 96%   BMI 30.51 kg/m²   Temp  Av.4 °F (36.9 °C)  Min: 97.5 °F (36.4 °C)  Max: 98.7 °F (37.1 °C)  Constitutional: The patient is awake, alert, and oriented. In bed. Wife at bedside. Skin: Warm and dry. No rashes were noted. HEENT: Round and reactive pupils. Moist mucous membranes. No ulcerations or thrush. Neck: Supple to movements. Chest: No use of accessory muscles to breathe. Symmetrical expansion. No wheezing, crackles or rhonchi. Cardiovascular: S1 and S2 are rhythmic and regular. No murmurs appreciated. Pacer in place. Abdomen: Positive bowel sounds to auscultation.  Benign to palpation. Extremities: No clubbing, no cyanosis, no edema. Lines: peripheral   Currie - jem, CBI running     Laboratory and Tests Review:  Lab Results   Component Value Date    WBC 10.8 11/26/2022    WBC 12.3 (H) 11/25/2022    WBC 12.5 (H) 11/24/2022    HGB 9.1 (L) 11/26/2022    HCT 29.7 (L) 11/26/2022    MCV 94.0 11/26/2022     11/26/2022     Lab Results   Component Value Date    NEUTROABS 6.03 11/26/2022    NEUTROABS 8.51 (H) 11/25/2022    NEUTROABS 8.13 (H) 11/24/2022     No results found for: CRPHS  Lab Results   Component Value Date    ALT 21 11/21/2022    AST 26 11/21/2022    ALKPHOS 94 11/21/2022    BILITOT 0.3 11/21/2022     Lab Results   Component Value Date/Time     11/26/2022 04:40 AM    K 4.1 11/26/2022 04:40 AM    K 4.0 11/24/2022 04:38 AM     11/26/2022 04:40 AM    CO2 22 11/26/2022 04:40 AM    BUN 17 11/26/2022 04:40 AM    CREATININE 1.3 11/26/2022 04:40 AM    CREATININE 1.5 11/25/2022 09:38 AM    CREATININE 1.6 11/24/2022 04:38 AM    GFRAA >60 04/22/2019 10:56 AM    LABGLOM 53 11/26/2022 04:40 AM    GLUCOSE 124 11/26/2022 04:40 AM    PROT 6.5 11/21/2022 12:04 PM    LABALBU 3.8 11/21/2022 12:04 PM    CALCIUM 8.2 11/26/2022 04:40 AM    BILITOT 0.3 11/21/2022 12:04 PM    ALKPHOS 94 11/21/2022 12:04 PM    AST 26 11/21/2022 12:04 PM    ALT 21 11/21/2022 12:04 PM     No results found for: CRP  No results found for: [address]  Radiology:      Microbiology:   Urine Culture 11/21/22: >100K  Klebsiella pneumoniae    ASSESSMENT:  Complicated UTI:   Spontaneous hematuria from anticoagulation use: s/p cystoscopy with clot evacuation 11/22  Leucocytosis    PLAN:  Continue IV Rocephin 1g q24 while inpatient - will switch to orals for discharge   Urology following: CBI running planning to wean soon   Monitor labs  Will continue to follow     RENETTA Lloyd - CNP  10:36 AM  11/26/2022     Pt seen and examined. Above discussed agree with advanced practice nurse.  Labs, cultures, and radiographs reviewed. Face to Face encounter occurred. Changes made as necessary.      Paul Bermudez MD

## 2022-11-26 NOTE — PROGRESS NOTES
Manually irrigated moran catheter. No clots removed. CBI running light pink.     Electronically signed by Thang German RN on 11/26/2022 at 5:14 AM

## 2022-11-26 NOTE — PROGRESS NOTES
[facility] Hospitalist   Progress Note    Admitting Date and Time: 11/21/2022 11:40 AM  Admit Dx: Sidney Grant hematuria [R31.0]  Acute cystitis with hematuria [N30.01]  BPH with elevated PSA [N40.0, R97.20]    Subjective:    Patient was admitted with Gross hematuria [R31.0]  Acute cystitis with hematuria [N30.01]  BPH with elevated PSA [N40.0, R97.20].  Patient denies fever, chills, cp, sob, n/v.     cefTRIAXone (ROCEPHIN) IV  1,000 mg IntraVENous Q24H    atorvastatin  80 mg Oral Nightly    folic acid  7,926 mcg Oral Daily    magnesium oxide  400 mg Oral Daily    metoprolol tartrate  25 mg Oral BID    pantoprazole  40 mg Oral QAM AC    sodium chloride flush  5-40 mL IntraVENous 2 times per day     sodium chloride, , PRN  oxyCODONE-acetaminophen, 1 tablet, Q4H PRN   Or  oxyCODONE-acetaminophen, 2 tablet, Q4H PRN  sodium chloride, , PRN  sodium chloride flush, 5-40 mL, PRN  sodium chloride, , PRN  ondansetron, 4 mg, Q8H PRN   Or  ondansetron, 4 mg, Q6H PRN  polyethylene glycol, 17 g, Daily PRN  acetaminophen, 650 mg, Q6H PRN   Or  acetaminophen, 650 mg, Q6H PRN  glucose, 4 tablet, PRN  dextrose bolus, 125 mL, PRN   Or  dextrose bolus, 250 mL, PRN  glucagon (rDNA), 1 mg, PRN  dextrose, , Continuous PRN         Objective:    /65   Pulse 71   Temp 98.6 °F (37 °C) (Oral)   Resp 16   Ht 5' 6\" (1.676 m)   Wt 189 lb (85.7 kg)   SpO2 96%   BMI 30.51 kg/m²   Skin: warm and dry, no rash or erythema  Pulmonary/Chest: clear to auscultation bilaterally- no wheezes, rales or rhonchi, normal air movement, no respiratory distress  Cardiovascular: rhythm reg at rate of 72  Abdomen: soft, non-tender, non-distended, normal bowel sounds, no masses or organomegaly  Extremities: no cyanosis, no clubbing, and no edema      Recent Labs     11/24/22  0438 11/25/22  0938 11/26/22  0440    139 138   K 4.0  4.0 4.0 4.1   * 107 108*   CO2 24 24 22   BUN 23 17 17   CREATININE 1.6* 1.5* 1.3*   GLUCOSE 128* 156* 124*   CALCIUM 8.3* 8.3* 8.2*       Recent Labs     11/24/22  0438 11/25/22  0938 11/25/22  1523 11/26/22  0440   WBC 12.5* 12.3*  --  10.8   RBC 2.83* 2.79*  --  3.16*   HGB 8.4* 8.4* 9.8* 9.1*   HCT 27.0* 27.0* 32.1* 29.7*   MCV 95.4 96.8  --  94.0   MCH 29.7 30.1  --  28.8   MCHC 31.1* 31.1*  --  30.6*   RDW 13.3 13.2  --  13.8    357  --  369   MPV 8.8 8.6  --  8.7       CBC with Differential:    Lab Results   Component Value Date/Time    WBC 10.8 11/26/2022 04:40 AM    RBC 3.16 11/26/2022 04:40 AM    HGB 9.1 11/26/2022 04:40 AM    HCT 29.7 11/26/2022 04:40 AM     11/26/2022 04:40 AM    MCV 94.0 11/26/2022 04:40 AM    MCH 28.8 11/26/2022 04:40 AM    MCHC 30.6 11/26/2022 04:40 AM    RDW 13.8 11/26/2022 04:40 AM    LYMPHOPCT 32.0 11/26/2022 04:40 AM    MONOPCT 8.8 11/26/2022 04:40 AM    BASOPCT 0.5 11/26/2022 04:40 AM    MONOSABS 0.95 11/26/2022 04:40 AM    LYMPHSABS 3.46 11/26/2022 04:40 AM    EOSABS 0.29 11/26/2022 04:40 AM    BASOSABS 0.05 11/26/2022 04:40 AM     BMP:    Lab Results   Component Value Date/Time     11/26/2022 04:40 AM    K 4.1 11/26/2022 04:40 AM    K 4.0 11/24/2022 04:38 AM     11/26/2022 04:40 AM    CO2 22 11/26/2022 04:40 AM    BUN 17 11/26/2022 04:40 AM    LABALBU 3.8 11/21/2022 12:04 PM    CREATININE 1.3 11/26/2022 04:40 AM    CALCIUM 8.2 11/26/2022 04:40 AM    GFRAA >60 04/22/2019 10:56 AM    LABGLOM 53 11/26/2022 04:40 AM    GLUCOSE 124 11/26/2022 04:40 AM        Radiology:   [facility] RETROGRADE PYELOGRAM W WO KUB   Final Result   Intraprocedural fluoroscopic spot images as above. See separate procedure   report for more information. CT ABDOMEN PELVIS W IV CONTRAST Additional Contrast? None   Final Result   Currie catheter in the bladder with decompressed bladder demonstrating   irregularly thickened wall, suspicious for underlying malignancy.   1.8 cm   cystic structure in in the posterolateral region of the bladder, suspicious   for bladder diverticulum or ureterocele. Urologic consultation and/or   cystoscopy evaluation recommended. Enlarged prostate gland. Multiple bilateral renal cysts. Constipation. Assessment:    Principal Problem:    Gross hematuria  Active Problems:    Urinary tract infection with hematuria    Anemia associated with acute blood loss    Bullous pemphigoid    Acidosis    Complicated UTI (urinary tract infection)    Acute kidney injury ([address]    Primary hypertension  Resolved Problems:    * No resolved hospital problems. *      Plan:  Hematuria s/p surg by urol incl turp. Irrigation per urology  Complicated Uti continue abx  ID following  Anemia with acute blood loss component monitor and transfuse prn  Atrial fib continue med but hold anticoagulation  Acidosis monitor  Bullous pemphigoid monitor  Htn monitor bp and adjust meds as needed  Hyperlipidemia continue statin  Adam   hold ace for now.  Monitor off fluids    Continue to encourage activity as possible    Electronically signed by Kreg Seip, DO on 11/26/2022 at 11:58 AM

## 2022-11-27 LAB
ANION GAP SERPL CALCULATED.3IONS-SCNC: 9 MMOL/L (ref 7–16)
BASOPHILS ABSOLUTE: 0.04 E9/L (ref 0–0.2)
BASOPHILS RELATIVE PERCENT: 0.3 % (ref 0–2)
BUN BLDV-MCNC: 16 MG/DL (ref 6–23)
CALCIUM SERPL-MCNC: 8.9 MG/DL (ref 8.6–10.2)
CHLORIDE BLD-SCNC: 105 MMOL/L (ref 98–107)
CO2: 24 MMOL/L (ref 22–29)
CREAT SERPL-MCNC: 1.3 MG/DL (ref 0.7–1.2)
EOSINOPHILS ABSOLUTE: 0.24 E9/L (ref 0.05–0.5)
EOSINOPHILS RELATIVE PERCENT: 2 % (ref 0–6)
GFR SERPL CREATININE-BSD FRML MDRD: 53 ML/MIN/1.73
GLUCOSE BLD-MCNC: 128 MG/DL (ref 74–99)
HCT VFR BLD CALC: 32.9 % (ref 37–54)
HEMOGLOBIN: 10.1 G/DL (ref 12.5–16.5)
IMMATURE GRANULOCYTES #: 0.03 E9/L
IMMATURE GRANULOCYTES %: 0.2 % (ref 0–5)
LYMPHOCYTES ABSOLUTE: 3.83 E9/L (ref 1.5–4)
LYMPHOCYTES RELATIVE PERCENT: 31.4 % (ref 20–42)
MCH RBC QN AUTO: 28.9 PG (ref 26–35)
MCHC RBC AUTO-ENTMCNC: 30.7 % (ref 32–34.5)
MCV RBC AUTO: 94 FL (ref 80–99.9)
MONOCYTES ABSOLUTE: 1.07 E9/L (ref 0.1–0.95)
MONOCYTES RELATIVE PERCENT: 8.8 % (ref 2–12)
NEUTROPHILS ABSOLUTE: 6.99 E9/L (ref 1.8–7.3)
NEUTROPHILS RELATIVE PERCENT: 57.3 % (ref 43–80)
PDW BLD-RTO: 13.5 FL (ref 11.5–15)
PLATELET # BLD: 415 E9/L (ref 130–450)
PMV BLD AUTO: 8.4 FL (ref 7–12)
POTASSIUM SERPL-SCNC: 3.9 MMOL/L (ref 3.5–5)
RBC # BLD: 3.5 E12/L (ref 3.8–5.8)
SODIUM BLD-SCNC: 138 MMOL/L (ref 132–146)
WBC # BLD: 12.2 E9/L (ref 4.5–11.5)

## 2022-11-27 PROCEDURE — 6370000000 HC RX 637 (ALT 250 FOR IP)

## 2022-11-27 PROCEDURE — 85025 COMPLETE CBC W/AUTO DIFF WBC: CPT

## 2022-11-27 PROCEDURE — 1200000000 HC SEMI PRIVATE

## 2022-11-27 PROCEDURE — 6360000002 HC RX W HCPCS: Performed by: INTERNAL MEDICINE

## 2022-11-27 PROCEDURE — 6360000002 HC RX W HCPCS

## 2022-11-27 PROCEDURE — 80048 BASIC METABOLIC PNL TOTAL CA: CPT

## 2022-11-27 PROCEDURE — 6370000000 HC RX 637 (ALT 250 FOR IP): Performed by: INTERNAL MEDICINE

## 2022-11-27 PROCEDURE — 2580000003 HC RX 258

## 2022-11-27 PROCEDURE — 99232 SBSQ HOSP IP/OBS MODERATE 35: CPT | Performed by: INTERNAL MEDICINE

## 2022-11-27 PROCEDURE — 6370000000 HC RX 637 (ALT 250 FOR IP): Performed by: PHYSICIAN ASSISTANT

## 2022-11-27 PROCEDURE — 51700 IRRIGATION OF BLADDER: CPT

## 2022-11-27 PROCEDURE — 2580000003 HC RX 258: Performed by: PHYSICIAN ASSISTANT

## 2022-11-27 PROCEDURE — 36415 COLL VENOUS BLD VENIPUNCTURE: CPT

## 2022-11-27 PROCEDURE — 6370000000 HC RX 637 (ALT 250 FOR IP): Performed by: UROLOGY

## 2022-11-27 RX ORDER — CEFDINIR 300 MG/1
300 CAPSULE ORAL EVERY 12 HOURS SCHEDULED
Status: DISCONTINUED | OUTPATIENT
Start: 2022-11-27 | End: 2022-11-28 | Stop reason: HOSPADM

## 2022-11-27 RX ORDER — MYCOPHENOLATE MOFETIL 250 MG/1
1500 CAPSULE ORAL 2 TIMES DAILY
Status: DISCONTINUED | OUTPATIENT
Start: 2022-11-27 | End: 2022-11-28 | Stop reason: HOSPADM

## 2022-11-27 RX ORDER — CLOBETASOL PROPIONATE 0.5 MG/G
CREAM TOPICAL 2 TIMES DAILY
Status: DISCONTINUED | OUTPATIENT
Start: 2022-11-27 | End: 2022-11-28 | Stop reason: HOSPADM

## 2022-11-27 RX ADMIN — Medication 400 MG: at 09:20

## 2022-11-27 RX ADMIN — CEFDINIR 300 MG: 300 CAPSULE ORAL at 20:42

## 2022-11-27 RX ADMIN — METOPROLOL TARTRATE 12.5 MG: 25 TABLET, FILM COATED ORAL at 09:21

## 2022-11-27 RX ADMIN — FOLIC ACID 1000 MCG: 1 TABLET ORAL at 09:20

## 2022-11-27 RX ADMIN — CLOBETASOL PROPIONATE CREAM USP, 0.05%: 0.5 CREAM TOPICAL at 20:42

## 2022-11-27 RX ADMIN — DICLOFENAC SODIUM 2 G: 10 GEL TOPICAL at 17:51

## 2022-11-27 RX ADMIN — Medication 10 ML: at 20:43

## 2022-11-27 RX ADMIN — ATORVASTATIN CALCIUM 80 MG: 40 TABLET, FILM COATED ORAL at 20:42

## 2022-11-27 RX ADMIN — MYCOPHENOLATE MOFETIL 1500 MG: 250 CAPSULE ORAL at 20:42

## 2022-11-27 RX ADMIN — Medication 10 ML: at 09:21

## 2022-11-27 RX ADMIN — OXYCODONE AND ACETAMINOPHEN 1 TABLET: 5; 325 TABLET ORAL at 20:49

## 2022-11-27 RX ADMIN — PANTOPRAZOLE SODIUM 40 MG: 40 TABLET, DELAYED RELEASE ORAL at 06:50

## 2022-11-27 RX ADMIN — WATER 1000 MG: 1 INJECTION INTRAMUSCULAR; INTRAVENOUS; SUBCUTANEOUS at 13:04

## 2022-11-27 RX ADMIN — Medication 10 ML: at 13:04

## 2022-11-27 ASSESSMENT — PAIN DESCRIPTION - PAIN TYPE: TYPE: ACUTE PAIN

## 2022-11-27 ASSESSMENT — PAIN DESCRIPTION - ONSET: ONSET: ON-GOING

## 2022-11-27 ASSESSMENT — PAIN SCALES - GENERAL: PAINLEVEL_OUTOF10: 6

## 2022-11-27 ASSESSMENT — PAIN DESCRIPTION - FREQUENCY: FREQUENCY: INTERMITTENT

## 2022-11-27 ASSESSMENT — PAIN DESCRIPTION - DESCRIPTORS: DESCRIPTORS: SPASM

## 2022-11-27 ASSESSMENT — PAIN DESCRIPTION - LOCATION: LOCATION: PELVIS

## 2022-11-27 NOTE — PROGRESS NOTES
11/27/2022 9:30 AM  Service: Urology  Group: [facility] ([facility])    Qian Anderson  [id number]    Chief urologic compliant: BPH  HPI:  Patient is doing ok    Review of Systems:  Respiratory: negative for cough and hemoptysis  Cardiovascular: negative for chest pain and dyspnea  Gastrointestinal: negative for abdominal pain, diarrhea, nausea and vomiting  Derm: negative for rash and skin lesion(s)  Neurological: negative for seizures and tremors  Endocrine: negative for diabetic symptoms including polydipsia and polyuria  : As above in the HPI, otherwise negative  All other reviews are negative     Allergies: Patient has no known allergies.     Objective:   Vitals:    11/27/22 0723   BP: 131/62   Pulse: 77   Resp: 16   Temp: 98.4 °F (36.9 °C)   SpO2: 94%       Neuro: A/A/O x3  Respiratory: non labored breathing  ABD: soft non-tender, non-distended  : moran clear  Ext: no clubbing, cyanosis, edema    Labs:   Recent Labs     11/25/22  0938 11/25/22  1523 11/26/22  0440 11/27/22  0240   WBC 12.3*  --  10.8 12.2*   RBC 2.79*  --  3.16* 3.50*   HGB 8.4* 9.8* 9.1* 10.1*   HCT 27.0* 32.1* 29.7* 32.9*   MCV 96.8  --  94.0 94.0   MCH 30.1  --  28.8 28.9   MCHC 31.1*  --  30.6* 30.7*   RDW 13.2  --  13.8 13.5     --  369 415   MPV 8.6  --  8.7 8.4       Recent Labs     11/25/22  0938 11/26/22  0440 11/27/22  0240   CREATININE 1.5* 1.3* 1.3*       Assessment: Qian Anderson 81 y.o. male     POD#5 cystoscopy, clot evacuation, attempted retrograde pyelogram, transurethral resection of the prostate   Anemia     Plan:    Cont the moran  Voiding trial in the AM  Cont the irrigations  Stop the CBI  Cont to watch the labs  Await the pathology   His son is present       Santos Ceferino, DO   [facility]  Urology

## 2022-11-27 NOTE — PROGRESS NOTES
CBI port capped per order. Urine yellow.    Electronically signed by João Griffin RN on 11/27/2022 at 1:03 PM

## 2022-11-27 NOTE — PROGRESS NOTES
Manually irrigated moran. No clots noted. Urine yellow.    Electronically signed by Geremias Salinas RN on 11/27/2022 at 9:39 AM

## 2022-11-27 NOTE — PROGRESS NOTES
GLUCOSE 156* 124* 128*   CALCIUM 8.3* 8.2* 8.9       Recent Labs     11/25/22  0938 11/25/22  1523 11/26/22  0440 11/27/22  0240   WBC 12.3*  --  10.8 12.2*   RBC 2.79*  --  3.16* 3.50*   HGB 8.4* 9.8* 9.1* 10.1*   HCT 27.0* 32.1* 29.7* 32.9*   MCV 96.8  --  94.0 94.0   MCH 30.1  --  28.8 28.9   MCHC 31.1*  --  30.6* 30.7*   RDW 13.2  --  13.8 13.5     --  369 415   MPV 8.6  --  8.7 8.4       CBC with Differential:    Lab Results   Component Value Date/Time    WBC 12.2 11/27/2022 02:40 AM    RBC 3.50 11/27/2022 02:40 AM    HGB 10.1 11/27/2022 02:40 AM    HCT 32.9 11/27/2022 02:40 AM     11/27/2022 02:40 AM    MCV 94.0 11/27/2022 02:40 AM    MCH 28.9 11/27/2022 02:40 AM    MCHC 30.7 11/27/2022 02:40 AM    RDW 13.5 11/27/2022 02:40 AM    LYMPHOPCT 31.4 11/27/2022 02:40 AM    MONOPCT 8.8 11/27/2022 02:40 AM    BASOPCT 0.3 11/27/2022 02:40 AM    MONOSABS 1.07 11/27/2022 02:40 AM    LYMPHSABS 3.83 11/27/2022 02:40 AM    EOSABS 0.24 11/27/2022 02:40 AM    BASOSABS 0.04 11/27/2022 02:40 AM     BMP:    Lab Results   Component Value Date/Time     11/27/2022 02:40 AM    K 3.9 11/27/2022 02:40 AM    K 4.0 11/24/2022 04:38 AM     11/27/2022 02:40 AM    CO2 24 11/27/2022 02:40 AM    BUN 16 11/27/2022 02:40 AM    LABALBU 3.8 11/21/2022 12:04 PM    CREATININE 1.3 11/27/2022 02:40 AM    CALCIUM 8.9 11/27/2022 02:40 AM    GFRAA >60 04/22/2019 10:56 AM    LABGLOM 53 11/27/2022 02:40 AM    GLUCOSE 128 11/27/2022 02:40 AM        Radiology:   [facility] RETROGRADE PYELOGRAM W WO KUB   Final Result   Intraprocedural fluoroscopic spot images as above. See separate procedure   report for more information. CT ABDOMEN PELVIS W IV CONTRAST Additional Contrast? None   Final Result   Currie catheter in the bladder with decompressed bladder demonstrating   irregularly thickened wall, suspicious for underlying malignancy.   1.8 cm   cystic structure in in the posterolateral region of the bladder, suspicious   for bladder diverticulum or ureterocele. Urologic consultation and/or   cystoscopy evaluation recommended. Enlarged prostate gland. Multiple bilateral renal cysts. Constipation. Assessment:    Principal Problem:    Gross hematuria  Active Problems:    Urinary tract infection with hematuria    Anemia associated with acute blood loss    Bullous pemphigoid    Acidosis    Complicated UTI (urinary tract infection)    Acute kidney injury ([facility] [address]    Primary hypertension  Resolved Problems:    * No resolved hospital problems. *      Plan:  Hematuria s/p surg by urol incl turp. Irrigation per urology  Complicated Uti continue abx  ID following  Anemia with acute blood loss component monitor and transfuse prn  Atrial fib continue med but hold anticoagulation  Acidosis monitor  Bullous pemphigoid monitor  Htn monitor bp and adjust meds as needed  Hyperlipidemia continue statin  Adam   hold ace for now. Monitor off fluids    Cellcept initially held. Would defer to ID about restarting with pt itching.     Electronically signed by Joel Solomon DO on 11/27/2022 at 11:36 AM

## 2022-11-27 NOTE — PROGRESS NOTES
[address] [place] [address] [facility]  Progress Note    SUBJECTIVE:  Chief Complaint   Patient presents with    Dysuria     X 1 wk, has been seeing urology and was self cathing     Urinary Frequency     Patient is tolerating medications. No reported adverse drug reactions. No nausea, vomiting, diarrhea. In bed, family at bedside  Feeling well today  Afebrile     Review of systems:  As stated above in the chief complaint, otherwise negative. Medications:  Scheduled Meds:   metoprolol tartrate  12.5 mg Oral Daily    cefTRIAXone (ROCEPHIN) IV  1,000 mg IntraVENous Q24H    atorvastatin  80 mg Oral Nightly    folic acid  5,249 mcg Oral Daily    magnesium oxide  400 mg Oral Daily    pantoprazole  40 mg Oral QAM AC    sodium chloride flush  5-40 mL IntraVENous 2 times per day     Continuous Infusions:   sodium chloride      sodium chloride      sodium chloride      dextrose       PRN Meds:sodium chloride, oxyCODONE-acetaminophen **OR** oxyCODONE-acetaminophen, sodium chloride, sodium chloride flush, sodium chloride, ondansetron **OR** ondansetron, polyethylene glycol, acetaminophen **OR** acetaminophen, glucose, dextrose bolus **OR** dextrose bolus, glucagon (rDNA), dextrose    OBJECTIVE:  /62   Pulse 77   Temp 98.4 °F (36.9 °C) (Oral)   Resp 16   Ht 5' 6\" (1.676 m)   Wt 185 lb (83.9 kg)   SpO2 94%   BMI 29.86 kg/m²   Temp  Av.5 °F (36.9 °C)  Min: 98.1 °F (36.7 °C)  Max: 98.8 °F (37.1 °C)  Constitutional: The patient is awake, alert, and oriented. In bed. Family at bedside. Skin: Warm and dry. No rashes were noted. HEENT: Round and reactive pupils. Moist mucous membranes. No ulcerations or thrush. Neck: Supple to movements. Chest: No use of accessory muscles to breathe. Symmetrical expansion. No wheezing, crackles or rhonchi. Cardiovascular: S1 and S2 are rhythmic and regular. No murmurs appreciated. Pacer in place. Abdomen: Positive bowel sounds to auscultation.  Benign to palpation. Extremities: No clubbing, no cyanosis, no edema. Lines: peripheral   Currie - light yellow     Laboratory and Tests Review:  Lab Results   Component Value Date    WBC 12.2 (H) 11/27/2022    WBC 10.8 11/26/2022    WBC 12.3 (H) 11/25/2022    HGB 10.1 (L) 11/27/2022    HCT 32.9 (L) 11/27/2022    MCV 94.0 11/27/2022     11/27/2022     Lab Results   Component Value Date    NEUTROABS 6.99 11/27/2022    NEUTROABS 6.03 11/26/2022    NEUTROABS 8.51 (H) 11/25/2022     No results found for: CRPHS  Lab Results   Component Value Date    ALT 21 11/21/2022    AST 26 11/21/2022    ALKPHOS 94 11/21/2022    BILITOT 0.3 11/21/2022     Lab Results   Component Value Date/Time     11/27/2022 02:40 AM    K 3.9 11/27/2022 02:40 AM    K 4.0 11/24/2022 04:38 AM     11/27/2022 02:40 AM    CO2 24 11/27/2022 02:40 AM    BUN 16 11/27/2022 02:40 AM    CREATININE 1.3 11/27/2022 02:40 AM    CREATININE 1.3 11/26/2022 04:40 AM    CREATININE 1.5 11/25/2022 09:38 AM    GFRAA >60 04/22/2019 10:56 AM    LABGLOM 53 11/27/2022 02:40 AM    GLUCOSE 128 11/27/2022 02:40 AM    PROT 6.5 11/21/2022 12:04 PM    LABALBU 3.8 11/21/2022 12:04 PM    CALCIUM 8.9 11/27/2022 02:40 AM    BILITOT 0.3 11/21/2022 12:04 PM    ALKPHOS 94 11/21/2022 12:04 PM    AST 26 11/21/2022 12:04 PM    ALT 21 11/21/2022 12:04 PM     No results found for: CRP  No results found for: [address]  Radiology:      Microbiology:   Urine Culture 11/21/22: >100K  Klebsiella pneumoniae    ASSESSMENT:  Complicated UTI:   Spontaneous hematuria from anticoagulation use: s/p cystoscopy with clot evacuation 11/22  Leucocytosis    PLAN:  Switch IV Rocephin 1g q24 to oral KATHERINE GREY    Urology following: stop CBI today    Monitor labs  Will continue to follow     RENETTA Weber CNP  11:32 AM  11/27/2022       Pt seen and examined. Above discussed agree with advanced practice nurse. Labs, cultures, and radiographs reviewed. Face to Face encounter occurred.  Changes made as necessary.      Frances Morrison MD

## 2022-11-27 NOTE — PROGRESS NOTES
Ok for cellcept per Dr. Demario Perales.     Electronically signed by Saurabh Anderson RN on 11/27/2022 at 4:08 PM

## 2022-11-27 NOTE — PROGRESS NOTES
Pt stated he had a spasm, moran manually irrigated. No clots noted. Urine yellow.      Electronically signed by Yary Knapp RN on 11/27/2022 at 4:05 PM

## 2022-11-28 VITALS
HEART RATE: 77 BPM | BODY MASS INDEX: 29.73 KG/M2 | OXYGEN SATURATION: 97 % | SYSTOLIC BLOOD PRESSURE: 122 MMHG | WEIGHT: 185 LBS | HEIGHT: 66 IN | DIASTOLIC BLOOD PRESSURE: 58 MMHG | RESPIRATION RATE: 16 BRPM | TEMPERATURE: 98 F

## 2022-11-28 PROCEDURE — 2580000003 HC RX 258: Performed by: PHYSICIAN ASSISTANT

## 2022-11-28 PROCEDURE — 6360000002 HC RX W HCPCS: Performed by: INTERNAL MEDICINE

## 2022-11-28 PROCEDURE — 51798 US URINE CAPACITY MEASURE: CPT

## 2022-11-28 PROCEDURE — 6370000000 HC RX 637 (ALT 250 FOR IP)

## 2022-11-28 PROCEDURE — 97165 OT EVAL LOW COMPLEX 30 MIN: CPT

## 2022-11-28 PROCEDURE — 6370000000 HC RX 637 (ALT 250 FOR IP): Performed by: INTERNAL MEDICINE

## 2022-11-28 PROCEDURE — 99239 HOSP IP/OBS DSCHRG MGMT >30: CPT | Performed by: STUDENT IN AN ORGANIZED HEALTH CARE EDUCATION/TRAINING PROGRAM

## 2022-11-28 PROCEDURE — 6370000000 HC RX 637 (ALT 250 FOR IP): Performed by: PHYSICIAN ASSISTANT

## 2022-11-28 RX ORDER — CEFDINIR 300 MG/1
300 CAPSULE ORAL EVERY 12 HOURS SCHEDULED
Qty: 8 CAPSULE | Refills: 0 | Status: SHIPPED | OUTPATIENT
Start: 2022-11-29 | End: 2022-12-03

## 2022-11-28 RX ORDER — CEFDINIR 300 MG/1
300 CAPSULE ORAL EVERY 12 HOURS SCHEDULED
Qty: 6 CAPSULE | Refills: 0 | Status: SHIPPED | OUTPATIENT
Start: 2022-11-29 | End: 2022-11-28 | Stop reason: SDUPTHER

## 2022-11-28 RX ADMIN — METOPROLOL TARTRATE 12.5 MG: 25 TABLET, FILM COATED ORAL at 09:08

## 2022-11-28 RX ADMIN — Medication 10 ML: at 09:09

## 2022-11-28 RX ADMIN — CLOBETASOL PROPIONATE CREAM USP, 0.05%: 0.5 CREAM TOPICAL at 09:07

## 2022-11-28 RX ADMIN — Medication 400 MG: at 09:08

## 2022-11-28 RX ADMIN — FOLIC ACID 1000 MCG: 1 TABLET ORAL at 09:08

## 2022-11-28 RX ADMIN — MYCOPHENOLATE MOFETIL 1500 MG: 250 CAPSULE ORAL at 09:07

## 2022-11-28 RX ADMIN — PANTOPRAZOLE SODIUM 40 MG: 40 TABLET, DELAYED RELEASE ORAL at 06:34

## 2022-11-28 RX ADMIN — CEFDINIR 300 MG: 300 CAPSULE ORAL at 09:08

## 2022-11-28 RX ADMIN — DICLOFENAC SODIUM 2 G: 10 GEL TOPICAL at 09:07

## 2022-11-28 ASSESSMENT — PAIN SCALES - GENERAL: PAINLEVEL_OUTOF10: 0

## 2022-11-28 NOTE — CARE COORDINATION
Social Work discharge planning    Pt asking for ww for home and did not have choice preference. Referral called to Mary Luis with [facility], who advised ww will be delivered to pt's home (not hospital room here).   Electronically signed by Enma Cantrell on 11/28/2022 at 3:01 PM

## 2022-11-28 NOTE — DISCHARGE SUMMARY
[facility] Physician Discharge Summary       Florina Jacobo MD  [address] [facility]  [phone]    Follow up in 2 day(s)        Activity level: As tolerated     Dispo: Home       Condition on discharge: Stable     Patient ID:  Lyric Mir  [id number]  60 y.o.  1935    Admit date: 11/21/2022    Discharge date and time:  11/28/2022  4:33 PM    Admission Diagnoses: Principal Problem:    Gross hematuria  Active Problems:    Urinary tract infection with hematuria    Anemia associated with acute blood loss    Bullous pemphigoid    Acidosis    Complicated UTI (urinary tract infection)    Acute kidney injury ([address]    Primary hypertension  Resolved Problems:    * No resolved hospital problems. *      Discharge Diagnoses: Principal Problem:    Gross hematuria  Active Problems:    Urinary tract infection with hematuria    Anemia associated with acute blood loss    Bullous pemphigoid    Acidosis    Complicated UTI (urinary tract infection)    Acute kidney injury ([address]    Primary hypertension  Resolved Problems:    * No resolved hospital problems. *      Consults:  IP CONSULT TO UROLOGY  IP CONSULT TO CARDIOLOGY  IP CONSULT TO INFECTIOUS DISEASES    Procedures: cystoscopy     Hospital Course:   27-year-old male who presented hospital for chief complaint of dysuria and hematuria. Patient was admitted for complicated UTI secondary to clot retention. Urology consulted, patient had cystoscopy panendoscopy with evacuation of clots and transurethral resection prostate. His home Eliquis was held during this hospital course. Infectious disease consulted, patient received broad-spectrum IV antibiotics. Patient recovered well after the urological procedure, Currie catheter removed has minimal postvoid residual on date of discharge. Pending biopsy. Patient is going to medically stable to discharge and follow-up urology outpatient.     Discharge Exam:    General Appearance: alert and oriented to person, place and time and in no acute distress  Skin: warm and dry  Head: normocephalic and atraumatic  Eyes: pupils equal, round, and reactive to light, extraocular eye movements intact, conjunctivae normal  Neck: neck supple and non tender without mass   Pulmonary/Chest: clear to auscultation bilaterally- no wheezes, rales or rhonchi, normal air movement, no respiratory distress  Cardiovascular: normal rate, normal S1 and S2 and no carotid bruits  Abdomen: soft, non-tender, non-distended, normal bowel sounds, no masses or organomegaly  Extremities: no cyanosis, no clubbing and no edema  Neurologic: no cranial nerve deficit and speech normal    I/O last 3 completed shifts: In: 240 [P.O.:240]  Out: 3700 [Urine:3700]  I/O this shift: In: 480 [P.O.:480]  Out: 475 [Urine:475]      LABS:  Recent Labs     11/26/22  0440 11/27/22  0240    138   K 4.1 3.9   * 105   CO2 22 24   BUN 17 16   CREATININE 1.3* 1.3*   GLUCOSE 124* 128*   CALCIUM 8.2* 8.9       Recent Labs     11/26/22  0440 11/27/22  0240   WBC 10.8 12.2*   RBC 3.16* 3.50*   HGB 9.1* 10.1*   HCT 29.7* 32.9*   MCV 94.0 94.0   MCH 28.8 28.9   MCHC 30.6* 30.7*   RDW 13.8 13.5    415   MPV 8.7 8.4       No results for input(s): POCGLU in the last 72 hours. Imaging:  CT ABDOMEN PELVIS W IV CONTRAST Additional Contrast? None    Result Date: 11/21/2022  EXAMINATION: CT OF THE ABDOMEN AND PELVIS WITH CONTRAST 11/21/2022 1:04 pm TECHNIQUE: CT of the abdomen and pelvis was performed with the administration of intravenous contrast. Multiplanar reformatted images are provided for review. Automated exposure control, iterative reconstruction, and/or weight based adjustment of the mA/kV was utilized to reduce the radiation dose to as low as reasonably achievable. COMPARISON: None.  HISTORY: ORDERING SYSTEM PROVIDED HISTORY: urinary retention, dysuria TECHNOLOGIST PROVIDED HISTORY: Additional Contrast?->None Reason for exam:->urinary retention, dysuria Decision Support Exception - unselect if not a suspected or confirmed emergency medical condition->Emergency Medical Condition (MA) FINDINGS: Lower Chest: No infiltrates or pleural effusion. Chronic parenchymal changes. Cardiomegaly. Organs: No focal liver lesions. Gallbladder is present with no calcified stones. Spleen is not enlarged. Pancreas and adrenal glands appear unremarkable. There is symmetric enhancement of the kidneys with no hydronephrosis. There are multiple bilateral renal cysts. GI/Bowel: There are no obstructing or constricting lesions. Large amount of retained stool in the colon. Sigmoid diverticulosis. Pelvis: Bladder is decompressed with irregular thickening of the wall. 1.8 cm cystic structure directly posterolateral region of the bladder, suspicious for bladder diverticulum or ureterocele. Currie catheter in place. Prostate gland is enlarged. There is no significant free fluid. Peritoneum/Retroperitoneum: Free air or significant adenopathy. Bones/Soft Tissues: Unremarkable. Currie catheter in the bladder with decompressed bladder demonstrating irregularly thickened wall, suspicious for underlying malignancy. 1.8 cm cystic structure in in the posterolateral region of the bladder, suspicious for bladder diverticulum or ureterocele. Urologic consultation and/or cystoscopy evaluation recommended. Enlarged prostate gland. Multiple bilateral renal cysts. Constipation. FL RETROGRADE PYELOGRAM W WO KUB    Result Date: 11/22/2022  EXAMINATION: SPOT FLUOROSCOPIC IMAGES 11/22/2022 1:18 pm TECHNIQUE: Fluoroscopy was provided by the radiology department for procedure. Radiologist was not present during examination.  FLUOROSCOPY DOSE AND TYPE OR TIME AND EXPOSURES: Fluoro time: 0.08 minutes and 1 image COMPARISON: CT abdomen and pelvis 11/21/2022 HISTORY: ORDERING SYSTEM PROVIDED HISTORY: eval of clots TECHNOLOGIST PROVIDED HISTORY: Reason for exam:->eval of clots Intraprocedural imaging. FINDINGS: 1 spot images of the abdomen were obtained. Intraprocedural fluoroscopic spot images as above. See separate procedure report for more information. Patient Instructions:      Medication List        START taking these medications      cefdinir 300 MG capsule  Commonly known as: OMNICEF  Take 1 capsule by mouth every 12 hours for 4 days  Start taking on: November 29, 2022            CONTINUE taking these medications      apixaban 5 MG Tabs tablet  Commonly known as: ELIQUIS     atorvastatin 80 MG tablet  Commonly known as: LIPITOR     Clobetasol Propionate 0.025 % Crea     clopidogrel 75 MG tablet  Commonly known as: PLAVIX     folic acid 291 MCG tablet  Commonly known as: FOLVITE     lisinopril 10 MG tablet  Commonly known as: PRINIVIL;ZESTRIL     magnesium oxide 400 MG tablet  Commonly known as: MAG-OX     metoprolol tartrate 25 MG tablet  Commonly known as: LOPRESSOR     mycophenolate 250 MG capsule  Commonly known as: CELLCEPT     nitroGLYCERIN 0.4 MG SL tablet  Commonly known as: NITROSTAT  up to max of 3 total doses. If no relief after 1 dose, call 911.      omeprazole 20 MG delayed release capsule  Commonly known as: PRILOSEC     tamsulosin 0.4 MG capsule  Commonly known as: FLOMAX     vitamin D-3 125 MCG (5000 UT) Tabs  Commonly known as: cholecalciferol            STOP taking these medications      aspirin 81 MG tablet     finasteride 5 MG tablet  Commonly known as: PROSCAR     metFORMIN 500 MG tablet  Commonly known as: GLUCOPHAGE     vitamin B-12 500 MCG tablet  Commonly known as: CYANOCOBALAMIN               Where to Get Your Medications        These medications were sent to [address] [address]      Phone: [phone]   cefdinir 300 MG capsule           Note that more than 30 minutes was spent in preparing discharge papers, discussing discharge with patient, medication review, etc.    Signed:  Electronically signed by Graceann Krabbe DO on 11/28/2022 at 4:33 PM

## 2022-11-28 NOTE — PROGRESS NOTES
Occupational Therapy    OCCUPATIONAL THERAPY INITIAL EVALUATION     [facility] [place], [place]         SXUN:                                                  Patient Name: Stevan Balbuena    MRN: [id number]    : 1935    Room: [address] [place] [address]      Evaluating OT: Rajendra Scott OTR/L   [id number]      Referring Provider:Stevan Bailey DO    Specific Provider Orders/Date:OT eval and treat 2022      Diagnosis:  Gross hematuria [R31.0]  Acute cystitis with hematuria [N30.01]  BPH with elevated PSA [N40.0, R97.20]     Pertinent Medical History: pacemaker,      Precautions:  Fall Risk,      Assessment of current deficits    [x] Functional mobility  [x]ADLs  [x] Strength               []Cognition    [x] Functional transfers   [x] IADLs         [x] Safety Awareness   [x]Endurance    [] Fine Coordination              [x] Balance      [] Vision/perception   []Sensation     []Gross Motor Coordination  [] ROM  [] Delirium                   [] Motor Control     OT PLAN OF CARE   OT POC based on physician orders, patient diagnosis and results of clinical assessment    Frequency/Duration  2-4 days/wk for 2 weeks PRN   Specific OT Treatment Interventions to include:   ADL retraining/adapted techniques and AE recommendations to increase functional independence within precautions                    Energy conservation techniques to improve tolerance for selfcare routine   Functional transfer/mobility training/DME recommendations for increased independence, safety and fall prevention         Patient/family education to increase safety and functional independence             Environmental modifications for safe mobility and completion of ADLs                             Therapeutic activity to improve functional performance during ADLs.                                          Therapeutic exercise to improve tolerance and functional strength for ADLs Balance retraining/tolerance tasks for facilitation of postural control with dynamic challenges during ADLs . Positioning to improve functional independence      Recommended Adaptive Equipment: wheeled walker - patient and wife requesting one-  notified      Home Living: Pt lives with wife, 1 story with 1 step in    Bathroom setup: walk in shower    Equipment owned: rollator , quad cane - wife and patient report its too big to maneuver around the house     Prior Level of Function: Independent  with ADLs , assist as needed  with IADLs; ambulated with rollator or quad cane        Pain Level: no pain this session ;   Cognition: A&O: 4/4;    Memory:  good    Sequencing:  good    Problem solving:  good    Judgement/safety:  good      Functional Assessment:  AM-PAC Daily Activity Raw Score: 17/24   Initial Eval Status  Date: 11/28/22 Treatment Status  Date: STGs = LTGs  Time frame: 10-14 days   Feeding Independent       Grooming SBA/set-up   Standing at sink washing hands  Mod I    UB Dressing SBA/set-up   Mod I    LB Dressing Min A   Mod I    Bathing Min A   Mod I    Toileting SBA  Mod I    Bed Mobility  SBA  Supine to sit   Mod  I    Functional Transfers SBA  Sit - stand from bed, commode   Mod I    Functional Mobility SBA,w/walker   Ambulated to/from bathroom   Mod I  with good tolerance    Balance Sitting:     Static:  Independent     Dynamic:SBA   Standing: SBA   Independent    Activity Tolerance No SOB   Good  with ADL activity    Visual/  Perceptual Glasses: yes                 Hand Dominance right   AROM (PROM) Strength Additional Info:    RUE  WFL WFL good  and wfl FMC/dexterity noted during ADL tasks       LUE WF WFL good  and wfl FMC/dexterity noted during ADL tasks       Hearing: [facility]   Sensation:  No c/o numbness or tingling  Tone: WFL   Edema: none observed     Comments: Upon arrival patient lying in bed .   At end of session, patient sitting in chair  with call light and phone within reach, all lines and tubes intact. Wife present in room, , ALARM ON     Overall patient demonstrated  decreased independence and safety during completion of ADL/functional transfer/mobility tasks. Pt would benefit from continued skilled OT to increase safety and independence with completion of ADL/IADL tasks for functional independence and quality of life. Rehab Potential: good for established goals     Patient / Family Goal: return home      Patient and/or family were instructed on functional diagnosis, prognosis/goals and OT plan of care. Demonstrated good  understanding. Eval Complexity: Low    Time In: 1156  Time Out: 1212      Min Units   OT Eval Low 97165 x  1   OT Eval Medium 89513      OT Eval High 11141      OT Re-Eval [id number]       Therapeutic Ex 08541      Therapeutic Activities 46946       ADL/Self Care 27020       Orthotic Management 21406       Manual 94231     Neuro Re-Ed 84248       Non-Billable Time         Evaluation Time additionally includes thorough review of current medical information, gathering information on past medical history/social history and prior level of function, interpretation of standardized testing/informal observation of tasks, assessment of data and development of plan of care and goals.             Azeb Lawton  OTR/L  OT [id number]

## 2022-11-28 NOTE — PROGRESS NOTES
Pt ambulated the low, walked the whole length of one side of the unit and tolerated very well. Back into room sitting in chair until ready for bed.    Electronically signed by Wolfgang Kiran RN on 11/27/2022 at 9:21 PM

## 2022-11-28 NOTE — PROGRESS NOTES
Ambulated pt in low again this am and pts urine remained yellow all night. Currie removed per order.    Electronically signed by Leonela Cramer RN on 11/28/2022 at 8:36 AM

## 2022-11-28 NOTE — PATIENT CARE CONFERENCE
P Quality Flow/Interdisciplinary Rounds Progress Note        Quality Flow Rounds held on November 28, 2022    Disciplines Attending:  Bedside Nurse, , , and Nursing Unit Leadership    Oli Valentin was admitted on 11/21/2022 11:40 AM    Anticipated Discharge Date:       Disposition:    Junior Score:  Junior Scale Score: 18    Readmission Risk              Risk of Unplanned Readmission:  15           Discussed patient goal for the day, patient clinical progression, and barriers to discharge.   The following Goal(s) of the Day/Commitment(s) have been identified:  kali Kelley RN  November 28, 2022

## 2022-11-28 NOTE — PROGRESS NOTES
Physician Progress Note      Antonio Betancur  CSN #:                  [id number]  :                       1935  ADMIT DATE:       2022 11:40 AM  DISCH DATE:  Jose Angel Pope  PROVIDER #:        Kaleen Bence ANITHA DO          QUERY TEXT:    Pt admitted with hematuria. Pt noted to have UTI and self cath at home. If   possible, please document in the progress notes and discharge summary if you   are evaluating and/or treating any of the following: The medical record reflects the following:  Risk Factors: self caths twice a week  Clinical Indicators: UA with many bacteria/large leukocytes, per IM   \". Shahnaz [place] Shahnaz Cardoso Patient has had ongoing dysuria and difficulty urinating. Patient states   for the past week he has had increased frequency and urgency of urinating. Also having burning pain at urethral opening prior to urinating. Pain would   resolve with urination and when patient would drink more water. On Thursday,   patient noticed large clot in urine. Urology was called on Friday but patient   did not hear back from office. Due to continued pain he presented to ED. States he only self cath twice about 2 weeks ago, and sin  Treatment: IV Rocephin    Thank you,  Kiana Alarcon RN, BSN, CDIS  Clinical Documentation Improvement  [email or web address]  Options provided:  -- UTI due to self catheterization  -- UTI not due to self catheterization  -- Other - I will add my own diagnosis  -- Disagree - Not applicable / Not valid  -- Disagree - Clinically unable to determine / Unknown  -- Refer to Clinical Documentation Reviewer    PROVIDER RESPONSE TEXT:    UTI is not due to self catheterization.     Query created by: Lesli Ballesteros on 2022 6:17 AM      Electronically signed by:  Lulu Ontiveros DO 2022 7:55 AM

## 2022-11-28 NOTE — PLAN OF CARE
Problem: ABCDS Injury Assessment  Goal: Absence of physical injury  11/28/2022 1024 by Jacoby Mata RN  Outcome: Progressing     Problem: Discharge Planning  Goal: Discharge to home or other facility with appropriate resources  11/28/2022 1024 by Jacoby Mata RN  Outcome: Progressing     Problem: Safety - Adult  Goal: Free from fall injury  11/28/2022 1024 by Jacoby Mata RN  Outcome: Progressing     Problem: Pain  Goal: Verbalizes/displays adequate comfort level or baseline comfort level  11/28/2022 1024 by Jacoby Mata RN  Outcome: Progressing     Problem: Chronic Conditions and Co-morbidities  Goal: Patient's chronic conditions and co-morbidity symptoms are monitored and maintained or improved  11/28/2022 1024 by Jacoby Mata RN  Outcome: Progressing     Problem: Skin/Tissue Integrity  Goal: Absence of new skin breakdown  Description: 1. Monitor for areas of redness and/or skin breakdown  2. Assess vascular access sites hourly  3. Every 4-6 hours minimum:  Change oxygen saturation probe site  4. Every 4-6 hours:  If on nasal continuous positive airway pressure, respiratory therapy assess nares and determine need for appliance change or resting period.   11/28/2022 1024 by Jacoby Mata RN  Outcome: Progressing

## 2022-11-28 NOTE — PROGRESS NOTES
[address] [place] [address] [facility]  Progress Note    SUBJECTIVE:  Chief Complaint   Patient presents with    Dysuria     X 1 wk, has been seeing urology and was self cathing     Urinary Frequency     Patient is tolerating medications. No reported adverse drug reactions. No nausea, vomiting, diarrhea. In bed, wife at bedside  Feeling well, moran is out and he has been urinating   Afebrile     Review of systems:  As stated above in the chief complaint, otherwise negative. Medications:  Scheduled Meds:   metoprolol tartrate  12.5 mg Oral Daily    mycophenolate  1,500 mg Oral BID    cefdinir  300 mg Oral 2 times per day    diclofenac sodium  2 g Topical BID    clobetasol   Topical BID    atorvastatin  80 mg Oral Nightly    folic acid  4,478 mcg Oral Daily    magnesium oxide  400 mg Oral Daily    pantoprazole  40 mg Oral QAM AC    sodium chloride flush  5-40 mL IntraVENous 2 times per day     Continuous Infusions:   sodium chloride      sodium chloride      sodium chloride      dextrose       PRN Meds:sodium chloride, oxyCODONE-acetaminophen **OR** oxyCODONE-acetaminophen, sodium chloride, sodium chloride flush, sodium chloride, ondansetron **OR** ondansetron, polyethylene glycol, acetaminophen **OR** acetaminophen, glucose, dextrose bolus **OR** dextrose bolus, glucagon (rDNA), dextrose    OBJECTIVE:  BP (!) 122/58   Pulse 77   Temp 98 °F (36.7 °C) (Oral)   Resp 16   Ht 5' 6\" (1.676 m)   Wt 185 lb (83.9 kg)   SpO2 97%   BMI 29.86 kg/m²   Temp  Av.4 °F (36.9 °C)  Min: 97.9 °F (36.6 °C)  Max: 99.2 °F (37.3 °C)  Constitutional: The patient is awake, alert, and oriented. In bed. Wife at bedside. Skin: Warm and dry. No rashes were noted. HEENT: Round and reactive pupils. Moist mucous membranes. No ulcerations or thrush. Neck: Supple to movements. Chest: No use of accessory muscles to breathe. Symmetrical expansion. No wheezing, crackles or rhonchi.   Cardiovascular: S1 and S2 are rhythmic and regular. No murmurs appreciated. Pacer in place. Abdomen: Positive bowel sounds to auscultation. Benign to palpation. Extremities: No clubbing, no cyanosis, no edema. Lines: peripheral    Laboratory and Tests Review:  Lab Results   Component Value Date    WBC 12.2 (H) 11/27/2022    WBC 10.8 11/26/2022    WBC 12.3 (H) 11/25/2022    HGB 10.1 (L) 11/27/2022    HCT 32.9 (L) 11/27/2022    MCV 94.0 11/27/2022     11/27/2022     Lab Results   Component Value Date    NEUTROABS 6.99 11/27/2022    NEUTROABS 6.03 11/26/2022    NEUTROABS 8.51 (H) 11/25/2022     No results found for: CRPHS  Lab Results   Component Value Date    ALT 21 11/21/2022    AST 26 11/21/2022    ALKPHOS 94 11/21/2022    BILITOT 0.3 11/21/2022     Lab Results   Component Value Date/Time     11/27/2022 02:40 AM    K 3.9 11/27/2022 02:40 AM    K 4.0 11/24/2022 04:38 AM     11/27/2022 02:40 AM    CO2 24 11/27/2022 02:40 AM    BUN 16 11/27/2022 02:40 AM    CREATININE 1.3 11/27/2022 02:40 AM    CREATININE 1.3 11/26/2022 04:40 AM    CREATININE 1.5 11/25/2022 09:38 AM    GFRAA >60 04/22/2019 10:56 AM    LABGLOM 53 11/27/2022 02:40 AM    GLUCOSE 128 11/27/2022 02:40 AM    PROT 6.5 11/21/2022 12:04 PM    LABALBU 3.8 11/21/2022 12:04 PM    CALCIUM 8.9 11/27/2022 02:40 AM    BILITOT 0.3 11/21/2022 12:04 PM    ALKPHOS 94 11/21/2022 12:04 PM    AST 26 11/21/2022 12:04 PM    ALT 21 11/21/2022 12:04 PM     No results found for: CRP  No results found for: [address]  Radiology:      Microbiology:   Urine Culture 11/21/22: >100K  Klebsiella pneumoniae    ASSESSMENT:  Complicated UTI:   Spontaneous hematuria from anticoagulation use: s/p cystoscopy with clot evacuation 11/22  Leucocytosis    PLAN:  Continue oral Omnicef (Day 6/10)  Urology following:   Monitor labs  Okay to DC from ID POV, script in chart     RENETTA Young CNP  1:36 PM  11/28/2022     Pt seen and examined. Above discussed agree with advanced practice nurse.  Labs, cultures, and radiographs reviewed. Face to Face encounter occurred. Changes made as necessary.      Lizzeth Lopez MD

## 2022-11-28 NOTE — DISCHARGE INSTRUCTIONS
Follow up with ABDULLAHI Colvin in 2 weeks   Call for appt  Call if any fever, chills, large amount of blood, clots, difficulty urinating

## 2022-11-28 NOTE — PROGRESS NOTES
CLINICAL PHARMACY NOTE: MEDS TO BEDS    Total # of Prescriptions Filled: 1   The following medications were delivered to the patient:  Cefdinir 300mg    Additional Documentation:

## 2022-11-28 NOTE — PROGRESS NOTES
11/28/2022 1:24 PM  Service: Urology  Group: [facility] (Roger/Vinicius/William)    Jordy Alba  [id number]    Subjective:    Feeling good  Walked in the hallway   Currently sitting in the chair  Voiding comfortably  Urine is yellow  Wife is at bedside       Review of Systems  Constitutional: no fever or chills  Respiratory: negative  Cardiovascular: negative for chest pain   Gastrointestinal: negative for nausea or vomiting  Dermatologic: negative  Hematologic/lymphatic: negative  Musculoskeletal:negative   Neurological: negative   Endocrine: negative  Psychiatric: negatuve  : see above    Scheduled Meds:   metoprolol tartrate  12.5 mg Oral Daily    mycophenolate  1,500 mg Oral BID    cefdinir  300 mg Oral 2 times per day    diclofenac sodium  2 g Topical BID    clobetasol   Topical BID    atorvastatin  80 mg Oral Nightly    folic acid  4,160 mcg Oral Daily    magnesium oxide  400 mg Oral Daily    pantoprazole  40 mg Oral QAM AC    sodium chloride flush  5-40 mL IntraVENous 2 times per day       Objective:  Vitals:    11/28/22 1229   BP: (!) 122/58   Pulse: 77   Resp: 16   Temp: 98 °F (36.7 °C)   SpO2: 97%         Allergies: Patient has no known allergies.     General Appearance: alert and oriented to person, place and time and in no acute distress  Skin: no rash or erythema  Head: normocephalic and atraumatic  Pulmonary/Chest: normal air movement, no respiratory distress Abdomen: soft, non-tender, non-distended  Genitalia: No moran catheter  Extremities: no cyanosis, clubbing or edema   Labs:     Recent Labs     11/27/22  0240      K 3.9      CO2 24   BUN 16   CREATININE 1.3*   GLUCOSE 128*   CALCIUM 8.9       Lab Results   Component Value Date/Time    HGB 10.1 11/27/2022 02:40 AM    HCT 32.9 11/27/2022 02:40 AM     Urine Culture 11/21/22: >100K  Klebsiella pneumoniae       Assessment/Plan:  UTI  Gross hematuria  BPH/Elevated PSA/Urinary retention     POD #5 cystoscopy, clot evacuation, attempted retrograde pyelogram, transurethral resection of the prostate     Catheter is out  Pt is voiding yellow urine   He is ambulating  Pathology is pending      Check PVR  Can be discharged from a  standpoint if PVR ok   Antibiotics per ID  Ok to restart Eliquis from a  standpoint         Peter Berman, APRN - CNP   [facility]

## 2022-11-30 ENCOUNTER — TELEPHONE (OUTPATIENT)
Dept: ADMINISTRATIVE | Age: 87
End: 2022-11-30

## 2023-10-19 ENCOUNTER — APPOINTMENT (OUTPATIENT)
Dept: CT IMAGING | Age: 88
End: 2023-10-19
Payer: MEDICARE

## 2023-10-19 ENCOUNTER — HOSPITAL ENCOUNTER (INPATIENT)
Age: 88
LOS: 1 days | Discharge: HOME | End: 2023-10-20
Attending: EMERGENCY MEDICINE | Admitting: INTERNAL MEDICINE
Payer: MEDICARE

## 2023-10-19 ENCOUNTER — APPOINTMENT (OUTPATIENT)
Dept: GENERAL RADIOLOGY | Age: 88
End: 2023-10-19
Payer: MEDICARE

## 2023-10-19 DIAGNOSIS — U07.1 COVID: Primary | ICD-10-CM

## 2023-10-19 DIAGNOSIS — J18.9 COMMUNITY ACQUIRED PNEUMONIA, UNSPECIFIED LATERALITY: ICD-10-CM

## 2023-10-19 DIAGNOSIS — N28.9 ACUTE RENAL INSUFFICIENCY: ICD-10-CM

## 2023-10-19 PROBLEM — J12.82 PNEUMONIA DUE TO COVID-19 VIRUS: Status: ACTIVE | Noted: 2023-10-19

## 2023-10-19 LAB
ALBUMIN SERPL-MCNC: 4.2 G/DL (ref 3.5–5.2)
ALP SERPL-CCNC: 91 U/L (ref 40–129)
ALT SERPL-CCNC: 10 U/L (ref 0–40)
ANION GAP SERPL CALCULATED.3IONS-SCNC: 13 MMOL/L (ref 7–16)
AST SERPL-CCNC: 22 U/L (ref 0–39)
BASOPHILS # BLD: 0.01 K/UL (ref 0–0.2)
BASOPHILS # BLD: 0.01 K/UL (ref 0–0.2)
BASOPHILS NFR BLD: 0 % (ref 0–2)
BASOPHILS NFR BLD: 0 % (ref 0–2)
BILIRUB SERPL-MCNC: 0.3 MG/DL (ref 0–1.2)
BILIRUB UR QL STRIP: NEGATIVE
BUN SERPL-MCNC: 26 MG/DL (ref 6–23)
CALCIUM SERPL-MCNC: 9.3 MG/DL (ref 8.6–10.2)
CASTS #/AREA URNS LPF: ABNORMAL /LPF
CHLORIDE SERPL-SCNC: 107 MMOL/L (ref 98–107)
CLARITY UR: CLEAR
CO2 SERPL-SCNC: 18 MMOL/L (ref 22–29)
COLOR UR: YELLOW
CREAT SERPL-MCNC: 1.8 MG/DL (ref 0.7–1.2)
EKG ATRIAL RATE: 67 BPM
EKG P AXIS: 54 DEGREES
EKG P-R INTERVAL: 170 MS
EKG Q-T INTERVAL: 512 MS
EKG QRS DURATION: 196 MS
EKG QTC CALCULATION (BAZETT): 541 MS
EKG R AXIS: -65 DEGREES
EKG T AXIS: 95 DEGREES
EKG VENTRICULAR RATE: 67 BPM
EOSINOPHIL # BLD: 0 K/UL (ref 0.05–0.5)
EOSINOPHIL # BLD: 0.03 K/UL (ref 0.05–0.5)
EOSINOPHILS RELATIVE PERCENT: 0 % (ref 0–6)
EOSINOPHILS RELATIVE PERCENT: 0 % (ref 0–6)
ERYTHROCYTE [DISTWIDTH] IN BLOOD BY AUTOMATED COUNT: 13.5 % (ref 11.5–15)
ERYTHROCYTE [DISTWIDTH] IN BLOOD BY AUTOMATED COUNT: 13.5 % (ref 11.5–15)
FLUAV RNA RESP QL NAA+PROBE: NOT DETECTED
FLUBV RNA RESP QL NAA+PROBE: NOT DETECTED
GFR SERPL CREATININE-BSD FRML MDRD: 36 ML/MIN/1.73M2
GLUCOSE SERPL-MCNC: 124 MG/DL (ref 74–99)
GLUCOSE UR STRIP-MCNC: NEGATIVE MG/DL
HCT VFR BLD AUTO: 40 % (ref 37–54)
HCT VFR BLD AUTO: 43.7 % (ref 37–54)
HGB BLD-MCNC: 12 G/DL (ref 12.5–16.5)
HGB BLD-MCNC: 13.4 G/DL (ref 12.5–16.5)
HGB UR QL STRIP.AUTO: NEGATIVE
IMM GRANULOCYTES # BLD AUTO: <0.03 K/UL (ref 0–0.58)
IMM GRANULOCYTES # BLD AUTO: <0.03 K/UL (ref 0–0.58)
IMM GRANULOCYTES NFR BLD: 0 % (ref 0–5)
IMM GRANULOCYTES NFR BLD: 0 % (ref 0–5)
KETONES UR STRIP-MCNC: NEGATIVE MG/DL
LACTATE BLDV-SCNC: 0.8 MMOL/L (ref 0.5–1.9)
LEUKOCYTE ESTERASE UR QL STRIP: NEGATIVE
LIPASE SERPL-CCNC: 51 U/L (ref 13–60)
LYMPHOCYTES NFR BLD: 1.91 K/UL (ref 1.5–4)
LYMPHOCYTES NFR BLD: 3.65 K/UL (ref 1.5–4)
LYMPHOCYTES RELATIVE PERCENT: 36 % (ref 20–42)
LYMPHOCYTES RELATIVE PERCENT: 53 % (ref 20–42)
MCH RBC QN AUTO: 28.9 PG (ref 26–35)
MCH RBC QN AUTO: 29 PG (ref 26–35)
MCHC RBC AUTO-ENTMCNC: 30 G/DL (ref 32–34.5)
MCHC RBC AUTO-ENTMCNC: 30.7 G/DL (ref 32–34.5)
MCV RBC AUTO: 94.2 FL (ref 80–99.9)
MCV RBC AUTO: 96.6 FL (ref 80–99.9)
MONOCYTES NFR BLD: 0.56 K/UL (ref 0.1–0.95)
MONOCYTES NFR BLD: 0.73 K/UL (ref 0.1–0.95)
MONOCYTES NFR BLD: 11 % (ref 2–12)
MONOCYTES NFR BLD: 11 % (ref 2–12)
NEUTROPHILS NFR BLD: 36 % (ref 43–80)
NEUTROPHILS NFR BLD: 53 % (ref 43–80)
NEUTS SEG NFR BLD: 2.46 K/UL (ref 1.8–7.3)
NEUTS SEG NFR BLD: 2.81 K/UL (ref 1.8–7.3)
NITRITE UR QL STRIP: NEGATIVE
PH UR STRIP: 5.5 [PH] (ref 5–9)
PLATELET # BLD AUTO: 214 K/UL (ref 130–450)
PLATELET # BLD AUTO: 233 K/UL (ref 130–450)
PMV BLD AUTO: 10 FL (ref 7–12)
PMV BLD AUTO: 9.6 FL (ref 7–12)
POTASSIUM SERPL-SCNC: 4.5 MMOL/L (ref 3.5–5)
PROCALCITONIN SERPL-MCNC: 0.09 NG/ML (ref 0–0.08)
PROT SERPL-MCNC: 7.5 G/DL (ref 6.4–8.3)
PROT UR STRIP-MCNC: 100 MG/DL
RBC # BLD AUTO: 4.14 M/UL (ref 3.8–5.8)
RBC # BLD AUTO: 4.64 M/UL (ref 3.8–5.8)
RBC #/AREA URNS HPF: ABNORMAL /HPF
SARS-COV-2 RNA RESP QL NAA+PROBE: DETECTED
SODIUM SERPL-SCNC: 138 MMOL/L (ref 132–146)
SOURCE: ABNORMAL
SP GR UR STRIP: 1.02 (ref 1–1.03)
SPECIMEN DESCRIPTION: ABNORMAL
TROPONIN I SERPL HS-MCNC: 34 NG/L (ref 0–11)
TROPONIN I SERPL HS-MCNC: 38 NG/L (ref 0–11)
UROBILINOGEN UR STRIP-ACNC: 0.2 EU/DL (ref 0–1)
WBC #/AREA URNS HPF: ABNORMAL /HPF
WBC OTHER # BLD: 5.3 K/UL (ref 4.5–11.5)
WBC OTHER # BLD: 6.9 K/UL (ref 4.5–11.5)

## 2023-10-19 PROCEDURE — 96375 TX/PRO/DX INJ NEW DRUG ADDON: CPT

## 2023-10-19 PROCEDURE — 6360000002 HC RX W HCPCS: Performed by: EMERGENCY MEDICINE

## 2023-10-19 PROCEDURE — 93005 ELECTROCARDIOGRAM TRACING: CPT | Performed by: EMERGENCY MEDICINE

## 2023-10-19 PROCEDURE — 84145 PROCALCITONIN (PCT): CPT

## 2023-10-19 PROCEDURE — 71250 CT THORAX DX C-: CPT

## 2023-10-19 PROCEDURE — 83605 ASSAY OF LACTIC ACID: CPT

## 2023-10-19 PROCEDURE — 99285 EMERGENCY DEPT VISIT HI MDM: CPT

## 2023-10-19 PROCEDURE — 2500000003 HC RX 250 WO HCPCS: Performed by: EMERGENCY MEDICINE

## 2023-10-19 PROCEDURE — 1200000000 HC SEMI PRIVATE

## 2023-10-19 PROCEDURE — 85025 COMPLETE CBC W/AUTO DIFF WBC: CPT

## 2023-10-19 PROCEDURE — 2580000003 HC RX 258: Performed by: EMERGENCY MEDICINE

## 2023-10-19 PROCEDURE — 83690 ASSAY OF LIPASE: CPT

## 2023-10-19 PROCEDURE — 87086 URINE CULTURE/COLONY COUNT: CPT

## 2023-10-19 PROCEDURE — 36415 COLL VENOUS BLD VENIPUNCTURE: CPT

## 2023-10-19 PROCEDURE — 96366 THER/PROPH/DIAG IV INF ADDON: CPT

## 2023-10-19 PROCEDURE — 84484 ASSAY OF TROPONIN QUANT: CPT

## 2023-10-19 PROCEDURE — 6370000000 HC RX 637 (ALT 250 FOR IP): Performed by: INTERNAL MEDICINE

## 2023-10-19 PROCEDURE — 96361 HYDRATE IV INFUSION ADD-ON: CPT

## 2023-10-19 PROCEDURE — 96365 THER/PROPH/DIAG IV INF INIT: CPT

## 2023-10-19 PROCEDURE — 93010 ELECTROCARDIOGRAM REPORT: CPT | Performed by: INTERNAL MEDICINE

## 2023-10-19 PROCEDURE — 87636 SARSCOV2 & INF A&B AMP PRB: CPT

## 2023-10-19 PROCEDURE — 87040 BLOOD CULTURE FOR BACTERIA: CPT

## 2023-10-19 PROCEDURE — 81001 URINALYSIS AUTO W/SCOPE: CPT

## 2023-10-19 PROCEDURE — 80053 COMPREHEN METABOLIC PANEL: CPT

## 2023-10-19 PROCEDURE — 71045 X-RAY EXAM CHEST 1 VIEW: CPT

## 2023-10-19 RX ORDER — CLOPIDOGREL BISULFATE 75 MG/1
75 TABLET ORAL DAILY
Status: DISCONTINUED | OUTPATIENT
Start: 2023-10-19 | End: 2023-10-20 | Stop reason: HOSPADM

## 2023-10-19 RX ORDER — LANOLIN ALCOHOL/MO/W.PET/CERES
400 CREAM (GRAM) TOPICAL 2 TIMES DAILY
Status: DISCONTINUED | OUTPATIENT
Start: 2023-10-19 | End: 2023-10-20 | Stop reason: HOSPADM

## 2023-10-19 RX ORDER — ACETAMINOPHEN 650 MG/1
650 SUPPOSITORY RECTAL EVERY 6 HOURS PRN
Status: DISCONTINUED | OUTPATIENT
Start: 2023-10-19 | End: 2023-10-20 | Stop reason: HOSPADM

## 2023-10-19 RX ORDER — LISINOPRIL 10 MG/1
10 TABLET ORAL 2 TIMES DAILY
Status: DISCONTINUED | OUTPATIENT
Start: 2023-10-19 | End: 2023-10-20 | Stop reason: HOSPADM

## 2023-10-19 RX ORDER — ACETAMINOPHEN 325 MG/1
650 TABLET ORAL EVERY 6 HOURS PRN
Status: DISCONTINUED | OUTPATIENT
Start: 2023-10-19 | End: 2023-10-20 | Stop reason: HOSPADM

## 2023-10-19 RX ORDER — PANTOPRAZOLE SODIUM 40 MG/1
40 TABLET, DELAYED RELEASE ORAL
Status: DISCONTINUED | OUTPATIENT
Start: 2023-10-20 | End: 2023-10-20 | Stop reason: HOSPADM

## 2023-10-19 RX ORDER — 0.9 % SODIUM CHLORIDE 0.9 %
1000 INTRAVENOUS SOLUTION INTRAVENOUS ONCE
Status: COMPLETED | OUTPATIENT
Start: 2023-10-19 | End: 2023-10-19

## 2023-10-19 RX ORDER — ATORVASTATIN CALCIUM 40 MG/1
80 TABLET, FILM COATED ORAL NIGHTLY
Status: DISCONTINUED | OUTPATIENT
Start: 2023-10-19 | End: 2023-10-20 | Stop reason: HOSPADM

## 2023-10-19 RX ORDER — FOLIC ACID 1 MG/1
1000 TABLET ORAL DAILY
Status: DISCONTINUED | OUTPATIENT
Start: 2023-10-19 | End: 2023-10-20 | Stop reason: HOSPADM

## 2023-10-19 RX ORDER — TAMSULOSIN HYDROCHLORIDE 0.4 MG/1
0.4 CAPSULE ORAL 2 TIMES DAILY
Status: DISCONTINUED | OUTPATIENT
Start: 2023-10-19 | End: 2023-10-20 | Stop reason: HOSPADM

## 2023-10-19 RX ORDER — MYCOPHENOLATE MOFETIL 250 MG/1
250 CAPSULE ORAL 2 TIMES DAILY
Status: DISCONTINUED | OUTPATIENT
Start: 2023-10-19 | End: 2023-10-20 | Stop reason: HOSPADM

## 2023-10-19 RX ORDER — DOXYCYCLINE HYCLATE 100 MG/1
100 CAPSULE ORAL EVERY 12 HOURS SCHEDULED
Status: DISCONTINUED | OUTPATIENT
Start: 2023-10-19 | End: 2023-10-20

## 2023-10-19 RX ORDER — VITAMIN B COMPLEX
5000 TABLET ORAL DAILY
Status: DISCONTINUED | OUTPATIENT
Start: 2023-10-19 | End: 2023-10-20 | Stop reason: HOSPADM

## 2023-10-19 RX ADMIN — LISINOPRIL 10 MG: 10 TABLET ORAL at 21:30

## 2023-10-19 RX ADMIN — FOLIC ACID 1000 MCG: 1 TABLET ORAL at 21:37

## 2023-10-19 RX ADMIN — CLOPIDOGREL BISULFATE 75 MG: 75 TABLET ORAL at 21:37

## 2023-10-19 RX ADMIN — DOXYCYCLINE HYCLATE 100 MG: 100 CAPSULE ORAL at 21:30

## 2023-10-19 RX ADMIN — DOXYCYCLINE 100 MG: 100 INJECTION, POWDER, LYOPHILIZED, FOR SOLUTION INTRAVENOUS at 13:41

## 2023-10-19 RX ADMIN — Medication 400 MG: at 21:30

## 2023-10-19 RX ADMIN — METOPROLOL TARTRATE 12.5 MG: 25 TABLET, FILM COATED ORAL at 21:31

## 2023-10-19 RX ADMIN — Medication 5000 UNITS: at 21:30

## 2023-10-19 RX ADMIN — ATORVASTATIN CALCIUM 80 MG: 40 TABLET, FILM COATED ORAL at 21:30

## 2023-10-19 RX ADMIN — TAMSULOSIN HYDROCHLORIDE 0.4 MG: 0.4 CAPSULE ORAL at 21:30

## 2023-10-19 RX ADMIN — WATER 1000 MG: 1 INJECTION INTRAMUSCULAR; INTRAVENOUS; SUBCUTANEOUS at 13:40

## 2023-10-19 RX ADMIN — APIXABAN 5 MG: 5 TABLET, FILM COATED ORAL at 21:37

## 2023-10-19 RX ADMIN — SODIUM CHLORIDE 1000 ML: 9 INJECTION, SOLUTION INTRAVENOUS at 12:21

## 2023-10-20 VITALS
SYSTOLIC BLOOD PRESSURE: 134 MMHG | HEART RATE: 73 BPM | RESPIRATION RATE: 18 BRPM | BODY MASS INDEX: 13.56 KG/M2 | TEMPERATURE: 98.4 F | DIASTOLIC BLOOD PRESSURE: 61 MMHG | WEIGHT: 84 LBS | OXYGEN SATURATION: 97 %

## 2023-10-20 LAB
25(OH)D3 SERPL-MCNC: 54.4 NG/ML (ref 30–100)
ANION GAP SERPL CALCULATED.3IONS-SCNC: 12 MMOL/L (ref 7–16)
BUN SERPL-MCNC: 18 MG/DL (ref 6–23)
CALCIUM SERPL-MCNC: 8.7 MG/DL (ref 8.6–10.2)
CHLORIDE SERPL-SCNC: 111 MMOL/L (ref 98–107)
CO2 SERPL-SCNC: 16 MMOL/L (ref 22–29)
CREAT SERPL-MCNC: 1.3 MG/DL (ref 0.7–1.2)
CRP SERPL HS-MCNC: 3 MG/L (ref 0–5)
GFR SERPL CREATININE-BSD FRML MDRD: 52 ML/MIN/1.73M2
GLUCOSE SERPL-MCNC: 110 MG/DL (ref 74–99)
POTASSIUM SERPL-SCNC: 4.4 MMOL/L (ref 3.5–5)
SODIUM SERPL-SCNC: 139 MMOL/L (ref 132–146)

## 2023-10-20 PROCEDURE — 2580000003 HC RX 258: Performed by: INTERNAL MEDICINE

## 2023-10-20 PROCEDURE — 6370000000 HC RX 637 (ALT 250 FOR IP)

## 2023-10-20 PROCEDURE — 6370000000 HC RX 637 (ALT 250 FOR IP): Performed by: INTERNAL MEDICINE

## 2023-10-20 PROCEDURE — 86140 C-REACTIVE PROTEIN: CPT

## 2023-10-20 PROCEDURE — 82306 VITAMIN D 25 HYDROXY: CPT

## 2023-10-20 PROCEDURE — 80048 BASIC METABOLIC PNL TOTAL CA: CPT

## 2023-10-20 PROCEDURE — 36415 COLL VENOUS BLD VENIPUNCTURE: CPT

## 2023-10-20 PROCEDURE — 6360000002 HC RX W HCPCS: Performed by: INTERNAL MEDICINE

## 2023-10-20 RX ORDER — PREDNISONE 10 MG/1
TABLET ORAL
Qty: 30 TABLET | Refills: 0 | Status: SHIPPED | OUTPATIENT
Start: 2023-10-20

## 2023-10-20 RX ORDER — DEXAMETHASONE 4 MG/1
6 TABLET ORAL DAILY
Status: DISCONTINUED | OUTPATIENT
Start: 2023-10-20 | End: 2023-10-20

## 2023-10-20 RX ORDER — ASCORBIC ACID 500 MG
500 TABLET ORAL DAILY
Status: DISCONTINUED | OUTPATIENT
Start: 2023-10-20 | End: 2023-10-20 | Stop reason: HOSPADM

## 2023-10-20 RX ORDER — ZINC SULFATE 50(220)MG
50 CAPSULE ORAL DAILY
Status: DISCONTINUED | OUTPATIENT
Start: 2023-10-20 | End: 2023-10-20 | Stop reason: HOSPADM

## 2023-10-20 RX ORDER — PREDNISONE 20 MG/1
40 TABLET ORAL DAILY
Status: DISCONTINUED | OUTPATIENT
Start: 2023-10-20 | End: 2023-10-20 | Stop reason: HOSPADM

## 2023-10-20 RX ADMIN — CLOPIDOGREL BISULFATE 75 MG: 75 TABLET ORAL at 08:45

## 2023-10-20 RX ADMIN — APIXABAN 5 MG: 5 TABLET, FILM COATED ORAL at 08:44

## 2023-10-20 RX ADMIN — Medication 400 MG: at 08:44

## 2023-10-20 RX ADMIN — ACETAMINOPHEN 650 MG: 325 TABLET ORAL at 16:05

## 2023-10-20 RX ADMIN — DOXYCYCLINE HYCLATE 100 MG: 100 CAPSULE ORAL at 08:45

## 2023-10-20 RX ADMIN — PANTOPRAZOLE SODIUM 40 MG: 40 TABLET, DELAYED RELEASE ORAL at 05:31

## 2023-10-20 RX ADMIN — MYCOPHENOLATE MOFETIL 250 MG: 250 CAPSULE ORAL at 00:53

## 2023-10-20 RX ADMIN — Medication 500 MG: at 08:50

## 2023-10-20 RX ADMIN — FOLIC ACID 1000 MCG: 1 TABLET ORAL at 08:44

## 2023-10-20 RX ADMIN — Medication 50 MG: at 08:50

## 2023-10-20 RX ADMIN — METOPROLOL TARTRATE 12.5 MG: 25 TABLET, FILM COATED ORAL at 08:46

## 2023-10-20 RX ADMIN — WATER 1000 MG: 1 INJECTION INTRAMUSCULAR; INTRAVENOUS; SUBCUTANEOUS at 12:35

## 2023-10-20 RX ADMIN — TAMSULOSIN HYDROCHLORIDE 0.4 MG: 0.4 CAPSULE ORAL at 08:44

## 2023-10-20 RX ADMIN — Medication 5000 UNITS: at 08:44

## 2023-10-20 RX ADMIN — MYCOPHENOLATE MOFETIL 250 MG: 250 CAPSULE ORAL at 08:46

## 2023-10-20 RX ADMIN — LISINOPRIL 10 MG: 10 TABLET ORAL at 08:45

## 2023-10-20 ASSESSMENT — PAIN SCALES - GENERAL: PAINLEVEL_OUTOF10: 6

## 2023-10-20 ASSESSMENT — PAIN DESCRIPTION - LOCATION: LOCATION: SHOULDER;NECK

## 2023-10-20 ASSESSMENT — PAIN DESCRIPTION - DESCRIPTORS: DESCRIPTORS: ACHING;DISCOMFORT;DULL

## 2023-10-20 ASSESSMENT — PAIN DESCRIPTION - ORIENTATION: ORIENTATION: LOWER;MID

## 2023-10-20 NOTE — ACP (ADVANCE CARE PLANNING)
Advance Care Planning   The patient has the following advanced directives on file:  Advance Directives       Power of [address] Will ACP-Advance Directive ACP-Power of     Not on File Not on File Not on File Not on File            The patient has appointed the following active healthcare agents:    Primary Decision MakerRonold Mahin Mistry Spouse - [phone]    Secondary Decision Maker: Swapna Mistry Child - [phone]    The Patient has the following current code status:    Code Status: Full Code      SARINA Muir  10/20/2023

## 2023-10-20 NOTE — CONSULTS
Jj Hussein M.D.,[place]  Sangeetha Servin D.O., F.A.C.O.I., Taisha Skaggs M.D. Ayah Peralta M.D. Ibrahima Quintana D.O. Patient:  Mariana Jones 81 y.o. male MRN: [id number]     Date of Service: 10/20/2023      PULMONARY CONSULTATION    Reason for Consultation: pneumonia  Referring Physician: Deonte Cartagena MD    Communication with the referring physician will be sent via the electronic medical record. Chief Complaint: cough, fatigue, diarrhea    CODE STATUS: Full Code     SUBJECTIVE:  HPI:  Mariana Jones is a 81 y.o. 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 Oliverio Harrell presented to the [facility] ED after having fevers, aches, cough with yellow sputum, fatigue, diarrhea and anorexia for 11 days. He denies any shortness of breath, sore throat, chest pain. He did test positive for COVID-19 on 10/09 and has just been taking OTC medications. It was decided he be transferred to [facility] for admission. Chest imaging does not support any evidence of pneumonia, however there is some atelectasis, residual viral markings and LLL scarring and a 5 mm RUL pulmonary nodule. Procalcitonin 0.09, CRP 3, rapid COVID screening positive    Oliverio Harrell was seen this morning with his daughter present at the bedside. He was comfortable on room air. He reports having a lot of white mucous this morning. Denies any fevers or shortness of breath.         Past Medical History:   Diagnosis Date    Aortic valve replaced 2015    Cancer ([address])     skin - removed     Diabetes mellitus ([address])     Elevated cholesterol     Hypertension     Pacemaker 2016    Prostate enlargement     Pulmonary emboli ([address]) 2011    S/P CABG x 1 2015    TIA (transient ischemic attack) 2016 AM    GFRAA >60 04/22/2019 10:56 AM    LABGLOM 52 10/20/2023 05:03 AM     Lab Results   Component Value Date/Time    PROTIME 12.0 10/20/2018 08:00 AM    INR 1.1 10/20/2018 08:00 AM     Recent Labs     10/19/23  1934   PROCAL 0.09*       Collected: 10/19/23 1145   Result status: Final   Resulting lab: [id number] "[facility] LAB   Reference range: Not Detected   Value: DETECTED Abnormal         Assessment:  COVID-19 infection without lung involvement  Bibasilar atelectasis  Pulmonary nodule, RUL 5 mm  Hx PE 2012  Hx aortic stenosis  Hx Hernandez's esophagus  Hx basal cell adenocarcinoma  Hx squamous cell carcinoma of buccal mucosa  Complete heart block, s/p ICD 11/7/2016  HTN  HLD    Plan:  Oxygen as needed to maintain SpO2 greater than 92%  No evidence of pneumonia on imaging - monitor off antibiotics  Vitamin cocktail  Prednisone taper-written for discharge  Patient is stable from a pulmonary standpoint and is ok for discharge      Thank you for allowing me to participate in the care of [facility]. Please feel free to call with questions. This plan of care was reviewed in collaboration with Dr. Tahir Khoury    Electronically signed by RENETTA Hernandez CNP on 10/20/2023 at 6:55 AM      Note: This report was completed utilizing computer voice recognition software. Every effort has been made to ensure accuracy, however; inadvertent computerized transcription errors may be present      I personally saw, examined, and cared for the patient. Labs, medications, radiographs reviewed. I agree with history exam and plans detailed in NP note. Clinically doing well after volume resuscitation. CT reviewed and appears to be sequela of recent COVID infection. Currently doing well and can be discharged with a prednisone taper.       Lesvia Better, DO

## 2023-10-20 NOTE — PLAN OF CARE
Problem: Safety - Adult  Goal: Free from fall injury  10/20/2023 0942 by Sai Lee RN  Outcome: Progressing  10/20/2023 8327 by Jono Edwards RN  Outcome: Progressing     Problem: ABCDS Injury Assessment  Goal: Absence of physical injury  10/20/2023 0942 by Sai Lee RN  Outcome: Progressing  10/20/2023 0633 by Jono Edwards RN  Outcome: Progressing     Problem: Skin/Tissue Integrity  Goal: Absence of new skin breakdown  Description: 1. Monitor for areas of redness and/or skin breakdown  2. Assess vascular access sites hourly  3. Every 4-6 hours minimum:  Change oxygen saturation probe site  4. Every 4-6 hours:  If on nasal continuous positive airway pressure, respiratory therapy assess nares and determine need for appliance change or resting period.   10/20/2023 0942 by Sai Lee RN  Outcome: Progressing  10/20/2023 4713 by Jono Edwards RN  Outcome: Progressing

## 2023-10-20 NOTE — CARE COORDINATION
Social Work/Case Management Transition of Care Planning Ly Angulo [phone]): Patient presented to the hospital due to concerns of fever, body aches, cough, fatigue and diarrhea. He was found to be Covid +. He was also found to have an NAHEED. Patient was started on IV Rocephin q24 and oral doxy. Patient is in Covid isolation. Attempted to call the patient in his room x3 with no answer. Phone call to patient's wife, Vanessa Luis, to obtain information. Patient resides in a 1 story home with 2 MAKI. He lives with his wife. Their daughters live nearby. Patient was independent with all aspects of care except for driving. Daughters provide transport. Patient has a quad cane and a FWW. No oxygen needs in the home. PCP is Dr. Tod Fried. Pharmacy is [address] [place] [address] in [facility]. HHC history with [facility]. No [facility] history. Discharge plan is to return home with no needs. If patient needs [facility], [facility]. Daughter will transport home. CM/SW will follow for needs. SARINA Mendoza  10/20/2023    The Plan for Transition of Care is related to the following treatment goals: [facility]    The Patient and/or patient representative  was provided with a choice of provider and agrees   with the discharge plan. [x] Yes [] No    Freedom of choice list was provided with basic dialogue that supports the patient's individualized plan of care/goals, treatment preferences and shares the quality data associated with the providers. [x] Yes [] No    Case Management Assessment  Initial Evaluation    Date/Time of Evaluation: 10/20/2023 2:56 PM  Assessment Completed by: SARINA Mendoza    If patient is discharged prior to next notation, then this note serves as note for discharge by case management.     Patient Name: Svetlana Escoto                   YOB: 1935  Diagnosis: Acute renal insufficiency [N28.9]  Community acquired pneumonia, unspecified laterality [J18.9]  COVID [U07.1]  Pneumonia due to COVID-19 virus [U07.1,

## 2023-10-20 NOTE — PLAN OF CARE
Problem: Safety - Adult  Goal: Free from fall injury  10/20/2023 1754 by Daina Marks RN  Outcome: Adequate for Discharge  10/20/2023 0582 by Daina Marks RN  Outcome: Progressing  10/20/2023 0633 by Daniel Banks RN  Outcome: Progressing     Problem: Skin/Tissue Integrity  Goal: Absence of new skin breakdown  Description: 1. Monitor for areas of redness and/or skin breakdown  2. Assess vascular access sites hourly  3. Every 4-6 hours minimum:  Change oxygen saturation probe site  4. Every 4-6 hours:  If on nasal continuous positive airway pressure, respiratory therapy assess nares and determine need for appliance change or resting period.   10/20/2023 1754 by Daina Marks RN  Outcome: Adequate for Discharge  10/20/2023 6279 by Daina Marks RN  Outcome: Progressing  10/20/2023 3102 by Daniel Banks RN  Outcome: Progressing     Problem: Chronic Conditions and Co-morbidities  Goal: Patient's chronic conditions and co-morbidity symptoms are monitored and maintained or improved  Outcome: Adequate for Discharge

## 2023-10-20 NOTE — H&P
[facility] Internal Medicine  History and Physical      CHIEF COMPLAINT: Fall    Reason for Admission: Possible pneumonia, COVID infection    History Obtained From: Patient, EMR    PCP :  Luke Sykes MD    [id number] [id number]      HISTORY OF PRESENT ILLNESS:      The patient is a 81 y.o. male presented to [facility] emergency room with body aches, cough with yellow phlegm, generalized fatigue. He also fell. He was noted to be positive for COVID. Patient was also thought to have evidence for community-acquired pneumonia. Patient was admitted. At the time of question patient is feeling generalized weakness. He denies shortness of breath. Past Medical History:        Diagnosis Date    Aortic valve replaced 2015    Cancer ([address])     skin - removed     Diabetes mellitus ([address])     Elevated cholesterol     Hypertension     Pacemaker 2016    Prostate enlargement     Pulmonary emboli ([address]) 2011    S/P CABG x 1 2015    TIA (transient ischemic attack) 2016     Past Surgical History:        Procedure Laterality Date    CARDIAC SURGERY  07/06/2015    AORTIV VALVE REPLACEMENT & BYPASS    CYSTOSCOPY N/A 11/22/2022    CYSTOSCOPY, EVACUATION OF CLOTS AND FULGURATION performed by Jamin Genao MD at [facility]           Medications Prior to Admission:    Medications Prior to Admission: apixaban (ELIQUIS) 5 MG TABS tablet, Take by mouth 2 times daily  Clobetasol Propionate 0.025 % CREA, Apply 0.05 % topically as needed  nitroGLYCERIN (NITROSTAT) 0.4 MG SL tablet, up to max of 3 total doses. If no relief after 1 dose, call 911.   lisinopril (PRINIVIL;ZESTRIL) 10 MG tablet, Take 1 tablet by mouth 2 times daily  magnesium oxide (MAG-OX) 400 MG tablet, Take 1 tablet by mouth 2 times daily  atorvastatin (LIPITOR) 80 MG tablet, Take 1 tablet by mouth nightly  mycophenolate (CELLCEPT) 250 MG capsule, Take by mouth 2 times daily Wife states he takes 6 capsules ( 1500mg ) in the

## 2023-10-20 NOTE — PROGRESS NOTES
Pharmacy Consultation Note    Consult date: 10/20/2023  Physician/provider: Dr. Ana Oleary has been consulted to evaluate criteria for Covid Medication therapy. Based on the algorithm, the patient DOES currently meet [facility] P&T approved Covid-19 treatment criteria for Baricitinib, Remdesivir, or Tocilizumab.     Thank you for the consult,  Suraj Mir, [facility]

## 2023-10-20 NOTE — PROGRESS NOTES
4 Eyes Skin Assessment     NAME:  Makenzie Jamison  YOB: 1935  MEDICAL RECORD NUMBER:  [id number]    The patient is being assessed for  Admission    I agree that at least one RN has performed a thorough Head to Toe Skin Assessment on the patient. ALL assessment sites listed below have been assessed. Areas assessed by both nurses:    Head, Face, Ears, Shoulders, Back, Chest, Arms, Elbows, Hands, Sacrum. Buttock, Coccyx, Ischium, and Legs. Feet and Heels        Does the Patient have a Wound?  No noted wound(s)    Mid chest old surgical scar  Left chest pacer  Bilat heels- boggy  Buttocks redness blanchable chronic discoloration   Junior Prevention initiated by RN: Yes  Wound Care Orders initiated by RN: No    Pressure Injury (Stage 3,4, Unstageable, DTI, NWPT, and Complex wounds) if present, place Wound referral order by RN under : No    New Ostomies, if present place, Ostomy referral order under : No     Nurse 1 eSignature: Electronically signed by Bryn Roy RN on 10/19/23 at 11:39 PM EDT    **SHARE this note so that the co-signing nurse can place an eSignature**    Nurse 2 eSignature: Electronically signed by Julia Summers RN on 10/20/23 at 12:09 AM EDT

## 2023-10-21 LAB
MICROORGANISM SPEC CULT: NO GROWTH
SPECIMEN DESCRIPTION: NORMAL

## 2023-10-22 LAB
MICROORGANISM SPEC CULT: NORMAL
MICROORGANISM SPEC CULT: NORMAL
SERVICE CMNT-IMP: NORMAL
SERVICE CMNT-IMP: NORMAL
SPECIMEN DESCRIPTION: NORMAL
SPECIMEN DESCRIPTION: NORMAL

## 2023-10-28 NOTE — PROGRESS NOTES
Physician Progress Note      Tye Bradford  [facility] #:                  [id number]  :                       1935  ADMIT DATE:       10/19/2023 10:56 AM  DISCH DATE:        10/20/2023 6:36 PM  RESPONDING  PROVIDER #:        Sarah Garcia MD          QUERY TEXT:    Noted to have BMI 13.56. If possible, please document in progress notes and   discharge summary if you are evaluating and /or treating any of the following: The medical record reflects the following:  Risk Factors: age, lifestyle, acute on chronic conditions  Clinical Indicators: BMI 13.56 per chart. Treatment: Oral nutritional supplement with meals    Thank you,  Eugene Tinajero, RN, BSN, CRCR, Clinical Documentation Improvement  Options provided:  -- Underweight with BMI 13.56  -- Other - I will add my own diagnosis  -- Disagree - Not applicable / Not valid  -- Disagree - Clinically unable to determine / Unknown  -- Refer to Clinical Documentation Reviewer    PROVIDER RESPONSE TEXT:    This patient is underweight with a BMI of 13.56.     Query created by: Eugene Tinajero on 10/26/2023 9:20 AM      Electronically signed by:  Sarah Garcia MD 10/28/2023 10:21 AM

## 2023-12-23 ENCOUNTER — APPOINTMENT (OUTPATIENT)
Dept: GENERAL RADIOLOGY | Age: 88
End: 2023-12-23
Payer: MEDICARE

## 2023-12-23 ENCOUNTER — HOSPITAL ENCOUNTER (EMERGENCY)
Age: 88
Discharge: HOME | End: 2023-12-23
Attending: EMERGENCY MEDICINE
Payer: MEDICARE

## 2023-12-23 VITALS
RESPIRATION RATE: 16 BRPM | HEART RATE: 79 BPM | DIASTOLIC BLOOD PRESSURE: 70 MMHG | TEMPERATURE: 97.4 F | OXYGEN SATURATION: 96 % | SYSTOLIC BLOOD PRESSURE: 134 MMHG

## 2023-12-23 DIAGNOSIS — J06.9 VIRAL URI WITH COUGH: Primary | ICD-10-CM

## 2023-12-23 LAB
FLUAV RNA RESP QL NAA+PROBE: NOT DETECTED
FLUBV RNA RESP QL NAA+PROBE: NOT DETECTED
SARS-COV-2 RNA RESP QL NAA+PROBE: NOT DETECTED
SOURCE: NORMAL
SPECIMEN DESCRIPTION: NORMAL

## 2023-12-23 PROCEDURE — 99284 EMERGENCY DEPT VISIT MOD MDM: CPT

## 2023-12-23 PROCEDURE — 87636 SARSCOV2 & INF A&B AMP PRB: CPT

## 2023-12-23 PROCEDURE — 71045 X-RAY EXAM CHEST 1 VIEW: CPT

## 2023-12-23 NOTE — ED PROVIDER NOTES
Jen Sandhu is a 81 y.o. male    HPI  Jen Sandhu is a 81 y.o. male presenting to the ED for URI (Coughing, congested, drainage,  was seen 12/18 for same thing,  continues to have issues)    History comes primarily from the patient and Family. Patient presents to the emergency department for cough, congestion, malaise. Patient was seen 5 days ago at urgent care diagnosed with viral illness but prescribed doxycycline and albuterol. The patient is here today because he is concerned that he is not getting better. The patient has not had any documented fevers with family member stating he had a low-grade 80 F temperature. No respiratory difficulties, chest pain, abdominal pain, nausea or vomiting, diarrhea, urinary symptoms, lightheadedness or dizziness      ROS  Full review of systems completed. Pertinent positives and negatives per the HPI, unless otherwise stated ROS is negative. Physical Exam  Vitals reviewed. Constitutional:       General: He is not in acute distress. Appearance: Normal appearance. He is not ill-appearing. HENT:      Head: Normocephalic and atraumatic. Right Ear: External ear normal.      Left Ear: External ear normal.      Nose: Nose normal. No rhinorrhea. Mouth/Throat:      Mouth: Mucous membranes are moist.      Pharynx: Oropharynx is clear. Eyes:      Extraocular Movements: Extraocular movements intact. Conjunctiva/sclera: Conjunctivae normal.      Pupils: Pupils are equal, round, and reactive to light. Cardiovascular:      Rate and Rhythm: Normal rate and regular rhythm. Heart sounds: Normal heart sounds. No murmur heard. Pulmonary:      Effort: Pulmonary effort is normal. No respiratory distress. Breath sounds: Normal breath sounds. Abdominal:      General: Abdomen is flat. There is no distension. Tenderness: There is no abdominal tenderness. Musculoskeletal:         General: No swelling or tenderness. Normal range of motion. Sat Dec 23, 2023   1008 ATTENDING PROVIDER ATTESTATION:     I have personally performed and/or participated in the history, exam, medical decision making, and procedures and agree with all pertinent clinical information. I have also reviewed and agree with the past medical, family and social history unless otherwise noted. I have discussed this patient in detail with the resident, and provided the instruction and education regarding persistent chest congestion with occasional wheezing and a cough. Patient states he has a good appetite. He has had no fevers. He was tested for COVID a few days ago and it was negative. He has mild scattered wheezing. No evidence of respiratory distress. He appears to be well-hydrated. He will undergo chest x-ray and testing for influenza and then likely discharge to home.   [RM]      ED Course User Index  [RM] Katherin Poe DO          Discussion: Patient presents for viral URI symptoms having previously been seen at urgent care and prescribed albuterol and doxycycline though he was diagnosed with viral URI. The patient's chest x-ray is unchanged compared to previous x-rays as far back as 2018. COVID and flu are negative. Patient is on Eliquis have not missed any doses, no clinical signs of pulmonary thrombosis, and so no concern for PE at this time. Supportive care discussed with the patient, return precautions and follow-up with his PCP. Patient to be discharged home to follow-up outpatient. Interpretation per the Radiologist below, if available at the time of this note:  XR CHEST PORTABLE   Final Result   No acute process. No results found. No results found.     Past medical history/chonic conditions affecting care   has a past medical history of Aortic valve replaced (2015), Cancer ([address]), Diabetes mellitus ([address]), Elevated cholesterol, Hypertension, Pacemaker (2016), Prostate enlargement, Pulmonary emboli ([address]) (2011), S/P CABG x 1

## 2024-01-10 ENCOUNTER — APPOINTMENT (OUTPATIENT)
Dept: DERMATOLOGY | Facility: CLINIC | Age: 89
End: 2024-01-10
Payer: MEDICARE

## 2024-04-13 ENCOUNTER — APPOINTMENT (OUTPATIENT)
Dept: GENERAL RADIOLOGY | Age: 89
End: 2024-04-13
Payer: MEDICARE

## 2024-04-13 ENCOUNTER — HOSPITAL ENCOUNTER (EMERGENCY)
Age: 89
Discharge: HOME | End: 2024-04-14
Attending: EMERGENCY MEDICINE
Payer: MEDICARE

## 2024-04-13 VITALS
OXYGEN SATURATION: 97 % | SYSTOLIC BLOOD PRESSURE: 141 MMHG | TEMPERATURE: 97.8 F | DIASTOLIC BLOOD PRESSURE: 59 MMHG | HEART RATE: 70 BPM | RESPIRATION RATE: 18 BRPM

## 2024-04-13 DIAGNOSIS — R07.9 CHEST PAIN, UNSPECIFIED TYPE: Primary | ICD-10-CM

## 2024-04-13 LAB
ALBUMIN SERPL-MCNC: 4.1 G/DL (ref 3.5–5.2)
ALP SERPL-CCNC: 91 U/L (ref 40–129)
ALT SERPL-CCNC: 8 U/L (ref 0–40)
ANION GAP SERPL CALCULATED.3IONS-SCNC: 14 MMOL/L (ref 7–16)
AST SERPL-CCNC: 17 U/L (ref 0–39)
BASOPHILS # BLD: 0 K/UL (ref 0–0.2)
BASOPHILS NFR BLD: 0 % (ref 0–2)
BILIRUB SERPL-MCNC: 0.4 MG/DL (ref 0–1.2)
BUN SERPL-MCNC: 29 MG/DL (ref 6–23)
CALCIUM SERPL-MCNC: 8.9 MG/DL (ref 8.6–10.2)
CHLORIDE SERPL-SCNC: 107 MMOL/L (ref 98–107)
CO2 SERPL-SCNC: 22 MMOL/L (ref 22–29)
CREAT SERPL-MCNC: 1.3 MG/DL (ref 0.7–1.2)
EOSINOPHIL # BLD: 0 K/UL (ref 0.05–0.5)
EOSINOPHILS RELATIVE PERCENT: 0 % (ref 0–6)
ERYTHROCYTE [DISTWIDTH] IN BLOOD BY AUTOMATED COUNT: 13.2 % (ref 11.5–15)
GFR SERPL CREATININE-BSD FRML MDRD: 54 ML/MIN/1.73M2
GLUCOSE SERPL-MCNC: 90 MG/DL (ref 74–99)
HCT VFR BLD AUTO: 35.5 % (ref 37–54)
HGB BLD-MCNC: 10.8 G/DL (ref 12.5–16.5)
INR PPP: 1.3
LIPASE SERPL-CCNC: 43 U/L (ref 13–60)
LYMPHOCYTES NFR BLD: 5.08 K/UL (ref 1.5–4)
LYMPHOCYTES RELATIVE PERCENT: 51 % (ref 20–42)
MAGNESIUM SERPL-MCNC: 1.8 MG/DL (ref 1.6–2.6)
MCH RBC QN AUTO: 29.3 PG (ref 26–35)
MCHC RBC AUTO-ENTMCNC: 30.4 G/DL (ref 32–34.5)
MCV RBC AUTO: 96.5 FL (ref 80–99.9)
MONOCYTES NFR BLD: 0.86 K/UL (ref 0.1–0.95)
MONOCYTES NFR BLD: 9 % (ref 2–12)
NEUTROPHILS NFR BLD: 40 % (ref 43–80)
NEUTS SEG NFR BLD: 3.96 K/UL (ref 1.8–7.3)
PLATELET # BLD AUTO: 241 K/UL (ref 130–450)
PMV BLD AUTO: 9.6 FL (ref 7–12)
POTASSIUM SERPL-SCNC: 4.5 MMOL/L (ref 3.5–5)
PROT SERPL-MCNC: 6.9 G/DL (ref 6.4–8.3)
PROTHROMBIN TIME: 14 SEC (ref 9.3–12.4)
RBC # BLD AUTO: 3.68 M/UL (ref 3.8–5.8)
RBC # BLD: ABNORMAL 10*6/UL
SODIUM SERPL-SCNC: 143 MMOL/L (ref 132–146)
TROPONIN I SERPL HS-MCNC: 35 NG/L (ref 0–11)
TROPONIN I SERPL HS-MCNC: 38 NG/L (ref 0–11)
WBC OTHER # BLD: 9.9 K/UL (ref 4.5–11.5)

## 2024-04-13 PROCEDURE — 83690 ASSAY OF LIPASE: CPT

## 2024-04-13 PROCEDURE — 83735 ASSAY OF MAGNESIUM: CPT

## 2024-04-13 PROCEDURE — 85610 PROTHROMBIN TIME: CPT

## 2024-04-13 PROCEDURE — 6370000000 HC RX 637 (ALT 250 FOR IP): Performed by: EMERGENCY MEDICINE

## 2024-04-13 PROCEDURE — 85025 COMPLETE CBC W/AUTO DIFF WBC: CPT

## 2024-04-13 PROCEDURE — 84484 ASSAY OF TROPONIN QUANT: CPT

## 2024-04-13 PROCEDURE — 99285 EMERGENCY DEPT VISIT HI MDM: CPT

## 2024-04-13 PROCEDURE — 71045 X-RAY EXAM CHEST 1 VIEW: CPT

## 2024-04-13 PROCEDURE — 80053 COMPREHEN METABOLIC PANEL: CPT

## 2024-04-13 RX ORDER — ASPIRIN 81 MG/1
324 TABLET, CHEWABLE ORAL ONCE
Status: COMPLETED | OUTPATIENT
Start: 2024-04-13 | End: 2024-04-13

## 2024-04-13 RX ADMIN — ASPIRIN 324 MG: 81 TABLET, CHEWABLE ORAL at 23:43

## 2024-04-13 RX ADMIN — LIDOCAINE HYDROCHLORIDE: 20 SOLUTION ORAL at 23:43

## 2024-04-13 ASSESSMENT — PAIN - FUNCTIONAL ASSESSMENT: PAIN_FUNCTIONAL_ASSESSMENT: NONE - DENIES PAIN

## 2024-04-13 ASSESSMENT — LIFESTYLE VARIABLES
HOW OFTEN DO YOU HAVE A DRINK CONTAINING ALCOHOL: MONTHLY OR LESS
HOW MANY STANDARD DRINKS CONTAINING ALCOHOL DO YOU HAVE ON A TYPICAL DAY: 1 OR 2

## 2024-04-13 NOTE — ED PROVIDER NOTES
HPI:  4/13/24,   Time: 7:04 PM EDT       Edchrissie Montana is a 88 y.o. male presenting to the ED for cp/epigastric pain, beginning 1 hr ago.  The complaint has been persistent, mild in severity, and worsened by nothing.  No history of same.  Occurred while watching golf on TV.  No shortness of breath.  No nausea vomiting diarrhea.  Has pacemaker.  Concerned that.  No fever/chill/sweats    Review of Systems:   Pertinent positives and negatives are stated within HPI, all other systems reviewed and are negative.          --------------------------------------------- PAST HISTORY ---------------------------------------------  Past Medical History:  has a past medical history of Aortic valve replaced, Cancer (HCC), Diabetes mellitus (HCC), Elevated cholesterol, Hypertension, Pacemaker, Prostate enlargement, Pulmonary emboli (HCC), S/P CABG x 1, and TIA (transient ischemic attack).    Past Surgical History:  has a past surgical history that includes Cardiac surgery (07/06/2015); pacemaker placement; and Cystoscopy (N/A, 11/22/2022).    Social History:  reports that he has quit smoking. He has quit using smokeless tobacco. He reports current alcohol use. He reports that he does not use drugs.    Family History: family history is not on file.     The patient’s home medications have been reviewed.    Allergies: Patient has no known allergies.        ---------------------------------------------------PHYSICAL EXAM--------------------------------------    Constitutional/General: Alert and oriented x3, well appearing, non toxic in NAD  Head: Normocephalic and atraumatic  Eyes: PERRL, EOMI, conjunctive normal, sclera non icteric  Mouth: Oropharynx clear, handling secretions,   Neck: Supple, full ROM,   Respiratory: Not in respiratory distress  Cardiovascular:  Regular rate. Regular rhythm. . 2+ distal pulses  Chest: No chest wall tenderness  GI:  Abdomen Soft, Non tender, Non distended.  +BS. No organomegaly, no palpable masses,

## 2024-04-17 LAB
EKG ATRIAL RATE: 300 BPM
EKG Q-T INTERVAL: 490 MS
EKG QRS DURATION: 202 MS
EKG QTC CALCULATION (BAZETT): 529 MS
EKG R AXIS: -71 DEGREES
EKG T AXIS: 95 DEGREES
EKG VENTRICULAR RATE: 70 BPM

## 2024-05-01 ENCOUNTER — OFFICE VISIT (OUTPATIENT)
Dept: DERMATOLOGY | Facility: CLINIC | Age: 89
End: 2024-05-01
Payer: MEDICARE

## 2024-05-01 DIAGNOSIS — L57.0 ACTINIC KERATOSIS: ICD-10-CM

## 2024-05-01 DIAGNOSIS — Z92.25 PERSONAL HISTORY OF IMMUNOSUPPRESSION THERAPY: ICD-10-CM

## 2024-05-01 DIAGNOSIS — L82.1 SEBORRHEIC KERATOSIS: ICD-10-CM

## 2024-05-01 DIAGNOSIS — D23.5 DILATED PORE OF WINER OF BACK: ICD-10-CM

## 2024-05-01 DIAGNOSIS — L12.0 BP (BULLOUS PEMPHIGOID) (MULTI): Primary | ICD-10-CM

## 2024-05-01 DIAGNOSIS — Z79.899 OTHER LONG TERM (CURRENT) DRUG THERAPY: ICD-10-CM

## 2024-05-01 DIAGNOSIS — D18.01 HEMANGIOMA OF SKIN: ICD-10-CM

## 2024-05-01 PROCEDURE — 99213 OFFICE O/P EST LOW 20 MIN: CPT | Performed by: DERMATOLOGY

## 2024-05-01 PROCEDURE — 1159F MED LIST DOCD IN RCRD: CPT | Performed by: DERMATOLOGY

## 2024-05-01 PROCEDURE — 17003 DESTRUCT PREMALG LES 2-14: CPT | Performed by: SPECIALIST

## 2024-05-01 PROCEDURE — 17000 DESTRUCT PREMALG LESION: CPT | Performed by: SPECIALIST

## 2024-05-01 RX ORDER — FOLIC ACID 1 MG/1
1 TABLET ORAL DAILY
COMMUNITY
Start: 2023-09-01

## 2024-05-01 RX ORDER — MYCOPHENOLATE MOFETIL 250 MG/1
CAPSULE ORAL
COMMUNITY
Start: 2023-06-05

## 2024-05-01 RX ORDER — METOPROLOL SUCCINATE 25 MG/1
TABLET, EXTENDED RELEASE ORAL
COMMUNITY
Start: 2023-09-01

## 2024-05-01 RX ORDER — PHENOL/SODIUM PHENOLATE
AEROSOL, SPRAY (ML) MUCOUS MEMBRANE
COMMUNITY
Start: 2023-09-01

## 2024-05-01 RX ORDER — LISINOPRIL 10 MG/1
1 TABLET ORAL 2 TIMES DAILY
COMMUNITY
Start: 2023-09-01

## 2024-05-01 RX ORDER — CALCIUM CARBONATE/VITAMIN D3 500-10/5ML
LIQUID (ML) ORAL
COMMUNITY
Start: 2023-09-01

## 2024-05-01 RX ORDER — ATORVASTATIN CALCIUM 80 MG/1
80 TABLET, FILM COATED ORAL
COMMUNITY

## 2024-05-01 RX ORDER — ACETAMINOPHEN 500 MG
5000 TABLET ORAL
COMMUNITY
Start: 2023-09-01

## 2024-05-01 RX ORDER — FERROUS SULFATE 325(65) MG
325 TABLET ORAL
COMMUNITY
Start: 2023-12-11

## 2024-05-01 RX ORDER — CLOPIDOGREL BISULFATE 75 MG/1
1 TABLET ORAL DAILY
COMMUNITY
Start: 2023-09-01

## 2024-05-01 RX ORDER — MYCOPHENOLATE MOFETIL 500 MG/1
TABLET ORAL
Qty: 150 TABLET | Refills: 11 | Status: SHIPPED | OUTPATIENT
Start: 2024-05-01 | End: 2025-05-01

## 2024-05-01 NOTE — PROGRESS NOTES
Subjective     Drake Boswell is a 88 y.o. male who presents for the following: Bullous Pemphigoid (CellCept 1500mg BID and Clob[facility] ([facility]) ) and Skin Check (Waist up exam //(Pt has a pacemaker) ).     He is here with his wife and one of his daughters today who provide some of the history.     They report that is has been quite some time since he has had itching or flares of his BP. He has clobetasol PRN.     Review of Systems:  No other skin or systemic complaints other than what is documented elsewhere in the note.    The following portions of the chart were reviewed this encounter and updated as appropriate:       Specialty Problems    None    Past Medical History:  Drake Boswell  has no past medical history on file.    Past Surgical History:  Drake Boswell  has no past surgical history on file.    Family History:  Patient family history is not on file.    Social History:  Drake Boswell  has no history on file for tobacco use, alcohol use, and drug use.    Allergies:  Patient has no known allergies.    Current Medications / CAM's:    Current Outpatient Medications:     apixaban (Eliquis) 5 mg tablet, Take 1 tablet twice a day by oral route., Disp: , Rfl:     atorvastatin (Lipitor) 80 mg tablet, Take 1 tablet (80 mg) by mouth once daily., Disp: , Rfl:     cholecalciferol (Vitamin D-3) 5,000 Units tablet, Take 1 tablet (5,000 Units) by mouth once daily., Disp: , Rfl:     clobetasoL 0.025 % cream, APPLY A THIN LAYER TO THE AFFECTED AREA(S) BY TOPICAL ROUTE 2 TIMES PER DAY ; RUB IN GENTLY AND COMPLETELY, Disp: , Rfl:     clopidogrel (Plavix) 75 mg tablet, Take 1 tablet (75 mg) by mouth once daily., Disp: , Rfl:     ferrous sulfate, 325 mg ferrous sulfate, tablet, 1 tablet., Disp: , Rfl:     folic acid (Folvite) 1 mg tablet, Take 1 tablet (1 mg) by mouth once daily., Disp: , Rfl:     lisinopril 10 mg tablet, Take 1 tablet (10 mg) by mouth 2 times a day., Disp: , Rfl:     magnesium oxide 400 mg magnesium  capsule, , Disp: , Rfl:     metoprolol succinate XL (Toprol-XL) 25 mg 24 hr tablet, Take 0.5 tablets every day by oral route for 30 days., Disp: , Rfl:     mycophenolate (Cellcept) 250 mg capsule, Take 6 capsules twice a day by oral route., Disp: , Rfl:     omeprazole (PriLOSEC) 20 mg tablet,delayed release (DR/EC) EC tablet, , Disp: , Rfl:     mycophenolate (Cellcept) 500 mg tablet, Take 3 tablets (1,500 mg) by mouth early in the morning. AND 2 tablets (1,000 mg) once daily at bedtime., Disp: 150 tablet, Rfl: 11     Objective   Well appearing patient in no apparent distress; mood and affect are within normal limits.    A full examination was performed including scalp, head, eyes, ears, nose, lips, neck, chest, axillae, abdomen, back, buttocks, bilateral upper extremities, bilateral lower extremities, hands, feet, fingers, toes, fingernails, and toenails. All findings within normal limits unless otherwise noted below.    Clear today    Stuck on verrucous, tan-brown papules and plaques.      Scattered cherry-red papule(s).    Left Forearm - Posterior, Mid Parietal Scalp, Right Forearm - Posterior, Right Temple, Right Zygomatic Area  Erythematous macules with gritty scale.    Mid Back  Dilated pore with oxidized keratin         Assessment/Plan   BP (bullous pemphigoid) (Multi)    - The diagnosis and treatment options were reviewed with the family today  - Discussed reducing MMF from 1500mg BID to 1500mg AM and 1000mg PM, pt and family were agreeable to this plan  - Continue clobetasol PRN  - [facility]24 from [facility] appear stable and without contraindications to continue MMF therapy    Related Procedures  Follow Up In Dermatology - Established Patient    Related Medications  mycophenolate (Cellcept) 500 mg tablet  Take 3 tablets (1,500 mg) by mouth early in the morning. AND 2 tablets (1,000 mg) once daily at bedtime.    Other long term (current) drug therapy    Personal history of immunosuppression  therapy    Actinic keratosis (5)  Left Forearm - Posterior; Right Forearm - Posterior; Mid Parietal Scalp; Right Temple; Right Zygomatic Area    Actinic Keratosis (AK)  - Premalignant nature of these lesions and chance of transformation to SCC discussed  - Risks and benefits of treatment include blister formation, scarring, redness, dyspigmentation (lighter OR darker)  - Patient agreeable to treatment of lesion(s) as outlined in procedure note    Destr of lesion - Left Forearm - Posterior, Mid Parietal Scalp, Right Forearm - Posterior, Right Temple, Right Zygomatic Area  Complexity: simple    Destruction method: cryotherapy    Informed consent: discussed and consent obtained    Lesion destroyed using liquid nitrogen: Yes    Cryotherapy cycles:  1  Outcome: patient tolerated procedure well with no complications    Post-procedure details: wound care instructions given      Dilated pore of Sof[place] of back  Mid Back    - benign nature reviewed, no treatment required    Seborrheic keratosis    -These are benign lesions and no treatment is required      Hemangioma of skin    -These are benign lesions and no treatment is required\       FUV 4mo, Sept      Sherin Horton MD     I saw and evaluated the patient. I personally obtained the key and critical portions of the history and physical exam or was physically present for key and critical portions performed by the resident/fellow. I reviewed the resident/fellow's documentation and discussed the patient with the resident/fellow. I agree with the resident/fellow's medical decision making as documented in the note.    Dallas Yang MD PhD

## 2024-09-10 ENCOUNTER — APPOINTMENT (OUTPATIENT)
Dept: DERMATOLOGY | Facility: CLINIC | Age: 89
End: 2024-09-10
Payer: MEDICARE

## 2024-09-10 DIAGNOSIS — L57.0 ACTINIC KERATOSIS: ICD-10-CM

## 2024-09-10 DIAGNOSIS — D18.01 HEMANGIOMA OF SKIN: ICD-10-CM

## 2024-09-10 DIAGNOSIS — Z92.25 PERSONAL HISTORY OF IMMUNOSUPPRESSION THERAPY: ICD-10-CM

## 2024-09-10 DIAGNOSIS — L91.8 SKIN TAG: ICD-10-CM

## 2024-09-10 DIAGNOSIS — Z79.899 OTHER LONG TERM (CURRENT) DRUG THERAPY: ICD-10-CM

## 2024-09-10 DIAGNOSIS — L12.0 BP (BULLOUS PEMPHIGOID) (MULTI): Primary | ICD-10-CM

## 2024-09-10 DIAGNOSIS — D48.5 NEOPLASM OF UNCERTAIN BEHAVIOR OF SKIN: ICD-10-CM

## 2024-09-10 RX ORDER — SERTRALINE HYDROCHLORIDE 25 MG/1
1 TABLET, FILM COATED ORAL
COMMUNITY
Start: 2024-08-22

## 2024-09-10 RX ORDER — MYCOPHENOLATE MOFETIL 250 MG/1
1000 CAPSULE ORAL 2 TIMES DAILY
Qty: 240 CAPSULE | Refills: 11 | Status: SHIPPED | OUTPATIENT
Start: 2024-09-10

## 2024-09-10 ASSESSMENT — ITCH NUMERIC RATING SCALE: HOW SEVERE IS YOUR ITCHING?: 0

## 2024-09-10 NOTE — PROGRESS NOTES
Subjective     Drake Boswell is a 89 y.o. male who presents for the following: Bullous Pemphigoid (FUV-Stable. Currently taking MMF 500mg 6 tabs AM. 4 tabs PM. ) and spot check (Right arm, back, right temple.).     Review of Systems:  No other skin or systemic complaints other than what is documented elsewhere in the note.    The following portions of the chart were reviewed this encounter and updated as appropriate:          Skin Cancer History  No skin cancer on file.      Specialty Problems    None       Objective   Well appearing patient in no apparent distress; mood and affect are within normal limits.    A focused skin examination was performed. All findings within normal limits unless otherwise noted below.    Assessment/Plan   1. BP (bullous pemphigoid) (Multi)  Clear today    - The diagnosis and treatment options were reviewed with the family today  - well controlled on 1500 mg AM and 1000 mg PM   - Denies any itching   - DECREASE MMF to 1000 mg bid   - Continue clobetasol PRN  - [facility] appear stable and without contraindications to continue MMF therapy. [facility]    Related Procedures  Follow Up In Dermatology - Established Patient    Related Medications  mycophenolate (Cellcept) 500 mg tablet  Take 3 tablets (1,500 mg) by mouth early in the morning. AND 2 tablets (1,000 mg) once daily at bedtime.    2. Other long term (current) drug therapy    3. Personal history of immunosuppression therapy    4. Actinic keratosis (2)  Left Forearm - Anterior, Right Forearm - Anterior  Erythematous macules with gritty scale.    Destr of lesion - Left Forearm - Anterior, Right Forearm - Anterior  Complexity: simple    Destruction method: cryotherapy    Informed consent: discussed and consent obtained    Lesion destroyed using liquid nitrogen: Yes    Region frozen until ice ball extended beyond lesion: Yes    Cryotherapy cycles:  1  Outcome: patient  tolerated procedure well with no complications    Post-procedure details: wound care instructions given      5. Neoplasm of uncertain behavior of skin  right temple  2 mm hyperkeratotic papule    Lesion biopsy  Type of biopsy: tangential    Informed consent: discussed and consent obtained    Timeout: patient name, date of birth, surgical site, and procedure verified    Procedure prep:  Patient was prepped and draped  Anesthesia: the lesion was anesthetized in a standard fashion    Anesthetic:  1% lidocaine w/ epinephrine 1-100,000 local infiltration  Instrument used: DermaBlade    Hemostasis achieved with: aluminum chloride    Outcome: patient tolerated procedure well    Post-procedure details: sterile dressing applied and wound care instructions given    Dressing type: petrolatum and bandage      6. Hemangioma of skin  Scattered cherry-red papule(s).    Discussed benign nature of lesion   Reassurance provided    7. Skin tag  Mid Back  Fleshy, skin-colored sessile papule    Discussed benign nature of lesion  Reassurance provided    RTC 4 months     Patient seen and discussed with Dr. Yang,  Brittany Prince MD MPH  PGY-2, Department of Dermatology     I saw and evaluated the patient, participating in the key elements of the service.  I discussed the findings, assessment and plan with the resident and agree with resident’s findings and plan as documented in the resident's note.  I was immediately available for the entirety of the procedure(s) and present for the key and critical portions.     Dallas Yang MD PhD

## 2024-09-12 LAB
LABORATORY COMMENT REPORT: NORMAL
PATH REPORT.FINAL DX SPEC: NORMAL
PATH REPORT.GROSS SPEC: NORMAL
PATH REPORT.MICROSCOPIC SPEC OTHER STN: NORMAL
PATH REPORT.RELEVANT HX SPEC: NORMAL
PATH REPORT.TOTAL CANCER: NORMAL

## 2024-09-15 ENCOUNTER — HOSPITAL ENCOUNTER (EMERGENCY)
Age: 89
Discharge: ANOTHER HOSPITAL | End: 2024-09-15
Attending: EMERGENCY MEDICINE
Payer: MEDICARE

## 2024-09-15 ENCOUNTER — APPOINTMENT (OUTPATIENT)
Dept: GENERAL RADIOLOGY | Age: 89
End: 2024-09-15
Payer: MEDICARE

## 2024-09-15 ENCOUNTER — APPOINTMENT (OUTPATIENT)
Dept: CT IMAGING | Age: 89
End: 2024-09-15
Payer: MEDICARE

## 2024-09-15 ENCOUNTER — HOSPITAL ENCOUNTER (INPATIENT)
Age: 89
LOS: 3 days | Discharge: HOME HEALTH | DRG: 872 | End: 2024-09-18
Attending: STUDENT IN AN ORGANIZED HEALTH CARE EDUCATION/TRAINING PROGRAM | Admitting: STUDENT IN AN ORGANIZED HEALTH CARE EDUCATION/TRAINING PROGRAM
Payer: OTHER GOVERNMENT

## 2024-09-15 VITALS
WEIGHT: 164 LBS | BODY MASS INDEX: 26.47 KG/M2 | RESPIRATION RATE: 18 BRPM | SYSTOLIC BLOOD PRESSURE: 114 MMHG | HEART RATE: 70 BPM | TEMPERATURE: 98.8 F | DIASTOLIC BLOOD PRESSURE: 50 MMHG | OXYGEN SATURATION: 95 %

## 2024-09-15 DIAGNOSIS — S09.90XA CLOSED HEAD INJURY, INITIAL ENCOUNTER: ICD-10-CM

## 2024-09-15 DIAGNOSIS — R55 SYNCOPE AND COLLAPSE: Primary | ICD-10-CM

## 2024-09-15 DIAGNOSIS — I95.9 HYPOTENSION, UNSPECIFIED HYPOTENSION TYPE: ICD-10-CM
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
## 2024-09-15 DIAGNOSIS — N30.01 ACUTE CYSTITIS WITH HEMATURIA: ICD-10-CM

## 2024-09-15 PROBLEM — Z79.01 CHRONIC ANTICOAGULATION: Status: ACTIVE | Noted: 2024-09-15
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
## 2024-09-15 LAB
ALBUMIN SERPL-MCNC: 4.1 G/DL (ref 3.5–5.2)
ALP SERPL-CCNC: 91 U/L (ref 40–129)
ALT SERPL-CCNC: 14 U/L (ref 0–40)
ANION GAP SERPL CALCULATED.3IONS-SCNC: 15 MMOL/L (ref 7–16)
AST SERPL-CCNC: 25 U/L (ref 0–39)
BACTERIA URNS QL MICRO: ABNORMAL
BASOPHILS # BLD: 0.04 K/UL (ref 0–0.2)
BASOPHILS NFR BLD: 0 % (ref 0–2)
BILIRUB SERPL-MCNC: 1 MG/DL (ref 0–1.2)
BILIRUB UR QL STRIP: NEGATIVE
BNP SERPL-MCNC: 8050 PG/ML (ref 0–450)
BUN SERPL-MCNC: 29 MG/DL (ref 6–23)
CALCIUM SERPL-MCNC: 9 MG/DL (ref 8.6–10.2)
CHLORIDE SERPL-SCNC: 102 MMOL/L (ref 98–107)
CLARITY UR: ABNORMAL
CO2 SERPL-SCNC: 23 MMOL/L (ref 22–29)
COLOR UR: ABNORMAL
CREAT SERPL-MCNC: 1.8 MG/DL (ref 0.7–1.2)
EOSINOPHIL # BLD: 0 K/UL (ref 0.05–0.5)
EOSINOPHILS RELATIVE PERCENT: 0 % (ref 0–6)
ERYTHROCYTE [DISTWIDTH] IN BLOOD BY AUTOMATED COUNT: 14.3 % (ref 11.5–15)
GFR, ESTIMATED: 36 ML/MIN/1.73M2
GLUCOSE SERPL-MCNC: 148 MG/DL (ref 74–99)
GLUCOSE UR STRIP-MCNC: NEGATIVE MG/DL
HCT VFR BLD AUTO: 34 % (ref 37–54)
HGB BLD-MCNC: 10.2 G/DL (ref 12.5–16.5)
HGB UR QL STRIP.AUTO: ABNORMAL
IMM GRANULOCYTES # BLD AUTO: 0.13 K/UL (ref 0–0.58)
IMM GRANULOCYTES NFR BLD: 1 % (ref 0–5)
INR PPP: 2.1
KETONES UR STRIP-MCNC: NEGATIVE MG/DL
LACTATE BLDV-SCNC: 1.3 MMOL/L (ref 0.5–2.2)
LACTATE BLDV-SCNC: 2.3 MMOL/L (ref 0.5–2.2)
LEUKOCYTE ESTERASE UR QL STRIP: ABNORMAL
LYMPHOCYTES NFR BLD: 3.5 K/UL (ref 1.5–4)
LYMPHOCYTES RELATIVE PERCENT: 18 % (ref 20–42)
MCH RBC QN AUTO: 29.4 PG (ref 26–35)
MCHC RBC AUTO-ENTMCNC: 30 G/DL (ref 32–34.5)
MCV RBC AUTO: 98 FL (ref 80–99.9)
MONOCYTES NFR BLD: 1.54 K/UL (ref 0.1–0.95)
MONOCYTES NFR BLD: 8 % (ref 2–12)
NEUTROPHILS NFR BLD: 74 % (ref 43–80)
NEUTS SEG NFR BLD: 14.43 K/UL (ref 1.8–7.3)
NITRITE UR QL STRIP: POSITIVE
PH UR STRIP: 5.5 [PH] (ref 5–9)
PLATELET # BLD AUTO: 260 K/UL (ref 130–450)
PMV BLD AUTO: 9.7 FL (ref 7–12)
POTASSIUM SERPL-SCNC: 4.1 MMOL/L (ref 3.5–5)
PROT SERPL-MCNC: 7.1 G/DL (ref 6.4–8.3)
PROT UR STRIP-MCNC: 100 MG/DL
PROTHROMBIN TIME: 23.5 SEC (ref 9.3–12.4)
RBC # BLD AUTO: 3.47 M/UL (ref 3.8–5.8)
RBC #/AREA URNS HPF: ABNORMAL /HPF
SARS-COV-2 RDRP RESP QL NAA+PROBE: NOT DETECTED
SODIUM SERPL-SCNC: 140 MMOL/L (ref 132–146)
SP GR UR STRIP: >1.03 (ref 1–1.03)
SPECIMEN DESCRIPTION: NORMAL
TROPONIN I SERPL HS-MCNC: 58 NG/L (ref 0–11)
TROPONIN I SERPL HS-MCNC: 62 NG/L (ref 0–11)
TROPONIN I SERPL HS-MCNC: 76 NG/L (ref 0–11)
UROBILINOGEN UR STRIP-ACNC: 1 EU/DL (ref 0–1)
WBC #/AREA URNS HPF: ABNORMAL /HPF
WBC OTHER # BLD: 19.6 K/UL (ref 4.5–11.5)

## 2024-09-15 PROCEDURE — 74176 CT ABD & PELVIS W/O CONTRAST: CPT

## 2024-09-15 PROCEDURE — 81001 URINALYSIS AUTO W/SCOPE: CPT

## 2024-09-15 PROCEDURE — 2580000003 HC RX 258: Performed by: NURSE PRACTITIONER

## 2024-09-15 PROCEDURE — 96365 THER/PROPH/DIAG IV INF INIT: CPT

## 2024-09-15 PROCEDURE — 71045 X-RAY EXAM CHEST 1 VIEW: CPT

## 2024-09-15 PROCEDURE — 83880 ASSAY OF NATRIURETIC PEPTIDE: CPT

## 2024-09-15 PROCEDURE — 72125 CT NECK SPINE W/O DYE: CPT

## 2024-09-15 PROCEDURE — APPSS60 APP SPLIT SHARED TIME 46-60 MINUTES: Performed by: NURSE PRACTITIONER

## 2024-09-15 PROCEDURE — 87040 BLOOD CULTURE FOR BACTERIA: CPT

## 2024-09-15 PROCEDURE — 87086 URINE CULTURE/COLONY COUNT: CPT

## 2024-09-15 PROCEDURE — 6360000002 HC RX W HCPCS: Performed by: NURSE PRACTITIONER

## 2024-09-15 PROCEDURE — 99285 EMERGENCY DEPT VISIT HI MDM: CPT

## 2024-09-15 PROCEDURE — 83605 ASSAY OF LACTIC ACID: CPT

## 2024-09-15 PROCEDURE — 93005 ELECTROCARDIOGRAM TRACING: CPT | Performed by: NURSE PRACTITIONER

## 2024-09-15 PROCEDURE — 96361 HYDRATE IV INFUSION ADD-ON: CPT

## 2024-09-15 PROCEDURE — 80053 COMPREHEN METABOLIC PANEL: CPT

## 2024-09-15 PROCEDURE — 85610 PROTHROMBIN TIME: CPT

## 2024-09-15 PROCEDURE — 72131 CT LUMBAR SPINE W/O DYE: CPT

## 2024-09-15 PROCEDURE — 70450 CT HEAD/BRAIN W/O DYE: CPT

## 2024-09-15 PROCEDURE — 87077 CULTURE AEROBIC IDENTIFY: CPT

## 2024-09-15 PROCEDURE — 6370000000 HC RX 637 (ALT 250 FOR IP): Performed by: NURSE PRACTITIONER

## 2024-09-15 PROCEDURE — 87635 SARS-COV-2 COVID-19 AMP PRB: CPT

## 2024-09-15 PROCEDURE — 2060000000 HC ICU INTERMEDIATE R&B

## 2024-09-15 PROCEDURE — 99223 1ST HOSP IP/OBS HIGH 75: CPT | Performed by: STUDENT IN AN ORGANIZED HEALTH CARE EDUCATION/TRAINING PROGRAM

## 2024-09-15 PROCEDURE — 6360000002 HC RX W HCPCS: Performed by: EMERGENCY MEDICINE

## 2024-09-15 PROCEDURE — 84484 ASSAY OF TROPONIN QUANT: CPT

## 2024-09-15 PROCEDURE — 85025 COMPLETE CBC W/AUTO DIFF WBC: CPT

## 2024-09-15 PROCEDURE — 2580000003 HC RX 258: Performed by: EMERGENCY MEDICINE

## 2024-09-15 RX ORDER — METOPROLOL TARTRATE 25 MG/1
12.5 TABLET, FILM COATED ORAL DAILY
Status: DISCONTINUED | OUTPATIENT
Start: 2024-09-16 | End: 2024-09-18 | Stop reason: HOSPADM

## 2024-09-15 RX ORDER — ONDANSETRON 2 MG/ML
4 INJECTION INTRAMUSCULAR; INTRAVENOUS EVERY 6 HOURS PRN
Status: DISCONTINUED | OUTPATIENT
Start: 2024-09-15 | End: 2024-09-15

## 2024-09-15 RX ORDER — POLYETHYLENE GLYCOL 3350 17 G/17G
17 POWDER, FOR SOLUTION ORAL DAILY PRN
Status: DISCONTINUED | OUTPATIENT
Start: 2024-09-15 | End: 2024-09-18 | Stop reason: HOSPADM

## 2024-09-15 RX ORDER — SODIUM CHLORIDE 0.9 % (FLUSH) 0.9 %
5-40 SYRINGE (ML) INJECTION PRN
Status: DISCONTINUED | OUTPATIENT
Start: 2024-09-15 | End: 2024-09-18 | Stop reason: HOSPADM

## 2024-09-15 RX ORDER — SERTRALINE HYDROCHLORIDE 25 MG/1
25 TABLET, FILM COATED ORAL DAILY
COMMUNITY

## 2024-09-15 RX ORDER — SODIUM CHLORIDE 0.9 % (FLUSH) 0.9 %
5-40 SYRINGE (ML) INJECTION EVERY 12 HOURS SCHEDULED
Status: DISCONTINUED | OUTPATIENT
Start: 2024-09-15 | End: 2024-09-18 | Stop reason: HOSPADM

## 2024-09-15 RX ORDER — 0.9 % SODIUM CHLORIDE 0.9 %
1000 INTRAVENOUS SOLUTION INTRAVENOUS ONCE
Status: COMPLETED | OUTPATIENT
Start: 2024-09-15 | End: 2024-09-15

## 2024-09-15 RX ORDER — ACETAMINOPHEN 650 MG/1
650 SUPPOSITORY RECTAL EVERY 6 HOURS PRN
Status: DISCONTINUED | OUTPATIENT
Start: 2024-09-15 | End: 2024-09-18 | Stop reason: HOSPADM

## 2024-09-15 RX ORDER — IPRATROPIUM BROMIDE AND ALBUTEROL SULFATE 2.5; .5 MG/3ML; MG/3ML
1 SOLUTION RESPIRATORY (INHALATION)
Status: DISCONTINUED | OUTPATIENT
Start: 2024-09-15 | End: 2024-09-15 | Stop reason: HOSPADM

## 2024-09-15 RX ORDER — FERROUS SULFATE 325(65) MG
325 TABLET, DELAYED RELEASE (ENTERIC COATED) ORAL
COMMUNITY

## 2024-09-15 RX ORDER — PROCHLORPERAZINE MALEATE 5 MG
5 TABLET ORAL EVERY 6 HOURS PRN
Status: DISCONTINUED | OUTPATIENT
Start: 2024-09-15 | End: 2024-09-18 | Stop reason: HOSPADM

## 2024-09-15 RX ORDER — ATORVASTATIN CALCIUM 40 MG/1
80 TABLET, FILM COATED ORAL NIGHTLY
Status: DISCONTINUED | OUTPATIENT
Start: 2024-09-15 | End: 2024-09-18 | Stop reason: HOSPADM

## 2024-09-15 RX ORDER — MYCOPHENOLATE MOFETIL 250 MG/1
1500 CAPSULE ORAL 2 TIMES DAILY
Status: DISCONTINUED | OUTPATIENT
Start: 2024-09-15 | End: 2024-09-15 | Stop reason: HOSPADM

## 2024-09-15 RX ORDER — PANTOPRAZOLE SODIUM 40 MG/1
40 TABLET, DELAYED RELEASE ORAL
Status: DISCONTINUED | OUTPATIENT
Start: 2024-09-16 | End: 2024-09-18 | Stop reason: HOSPADM

## 2024-09-15 RX ORDER — SODIUM CHLORIDE 9 MG/ML
INJECTION, SOLUTION INTRAVENOUS CONTINUOUS
Status: DISCONTINUED | OUTPATIENT
Start: 2024-09-15 | End: 2024-09-15 | Stop reason: HOSPADM

## 2024-09-15 RX ORDER — CLOPIDOGREL BISULFATE 75 MG/1
75 TABLET ORAL DAILY
Status: DISCONTINUED | OUTPATIENT
Start: 2024-09-16 | End: 2024-09-18 | Stop reason: HOSPADM

## 2024-09-15 RX ORDER — ATORVASTATIN CALCIUM 80 MG/1
80 TABLET, FILM COATED ORAL NIGHTLY
Status: DISCONTINUED | OUTPATIENT
Start: 2024-09-15 | End: 2024-09-15 | Stop reason: HOSPADM

## 2024-09-15 RX ORDER — ACETAMINOPHEN 325 MG/1
650 TABLET ORAL EVERY 6 HOURS PRN
Status: DISCONTINUED | OUTPATIENT
Start: 2024-09-15 | End: 2024-09-18 | Stop reason: HOSPADM

## 2024-09-15 RX ORDER — ONDANSETRON 4 MG/1
4 TABLET, ORALLY DISINTEGRATING ORAL EVERY 8 HOURS PRN
Status: DISCONTINUED | OUTPATIENT
Start: 2024-09-15 | End: 2024-09-15

## 2024-09-15 RX ORDER — VITAMIN B COMPLEX
5000 TABLET ORAL DAILY
Status: DISCONTINUED | OUTPATIENT
Start: 2024-09-16 | End: 2024-09-18 | Stop reason: HOSPADM

## 2024-09-15 RX ORDER — MYCOPHENOLATE MOFETIL 250 MG/1
250 CAPSULE ORAL 2 TIMES DAILY
Status: DISCONTINUED | OUTPATIENT
Start: 2024-09-15 | End: 2024-09-18 | Stop reason: HOSPADM

## 2024-09-15 RX ORDER — SODIUM CHLORIDE 9 MG/ML
INJECTION, SOLUTION INTRAVENOUS CONTINUOUS
Status: ACTIVE | OUTPATIENT
Start: 2024-09-15 | End: 2024-09-17

## 2024-09-15 RX ORDER — IOPAMIDOL 755 MG/ML
75 INJECTION, SOLUTION INTRAVASCULAR
Status: DISCONTINUED | OUTPATIENT
Start: 2024-09-15 | End: 2024-09-15

## 2024-09-15 RX ORDER — PROCHLORPERAZINE EDISYLATE 5 MG/ML
10 INJECTION INTRAMUSCULAR; INTRAVENOUS EVERY 6 HOURS PRN
Status: DISCONTINUED | OUTPATIENT
Start: 2024-09-15 | End: 2024-09-18 | Stop reason: HOSPADM

## 2024-09-15 RX ORDER — SERTRALINE HYDROCHLORIDE 25 MG/1
25 TABLET, FILM COATED ORAL DAILY
Status: DISCONTINUED | OUTPATIENT
Start: 2024-09-15 | End: 2024-09-15 | Stop reason: HOSPADM

## 2024-09-15 RX ORDER — SODIUM CHLORIDE 9 MG/ML
INJECTION, SOLUTION INTRAVENOUS PRN
Status: DISCONTINUED | OUTPATIENT
Start: 2024-09-15 | End: 2024-09-18 | Stop reason: HOSPADM

## 2024-09-15 RX ORDER — LISINOPRIL 10 MG/1
10 TABLET ORAL 2 TIMES DAILY
Status: DISCONTINUED | OUTPATIENT
Start: 2024-09-15 | End: 2024-09-18 | Stop reason: HOSPADM

## 2024-09-15 RX ORDER — FERROUS SULFATE 325(65) MG
325 TABLET ORAL
Status: DISCONTINUED | OUTPATIENT
Start: 2024-09-16 | End: 2024-09-18 | Stop reason: HOSPADM

## 2024-09-15 RX ORDER — LANOLIN ALCOHOL/MO/W.PET/CERES
400 CREAM (GRAM) TOPICAL 2 TIMES DAILY
Status: DISCONTINUED | OUTPATIENT
Start: 2024-09-15 | End: 2024-09-18 | Stop reason: HOSPADM

## 2024-09-15 RX ORDER — LANOLIN ALCOHOL/MO/W.PET/CERES
400 CREAM (GRAM) TOPICAL 2 TIMES DAILY
Status: DISCONTINUED | OUTPATIENT
Start: 2024-09-15 | End: 2024-09-15 | Stop reason: HOSPADM

## 2024-09-15 RX ADMIN — MYCOPHENOLATE MOFETIL 250 MG: 250 CAPSULE ORAL at 22:12

## 2024-09-15 RX ADMIN — DESMOPRESSIN ACETATE 80 MG: 0.2 TABLET ORAL at 22:12

## 2024-09-15 RX ADMIN — CEFEPIME 1000 MG: 1 INJECTION, POWDER, FOR SOLUTION INTRAMUSCULAR; INTRAVENOUS at 16:11

## 2024-09-15 RX ADMIN — SODIUM CHLORIDE 1000 ML: 9 INJECTION, SOLUTION INTRAVENOUS at 13:20

## 2024-09-15 RX ADMIN — SODIUM CHLORIDE: 9 INJECTION, SOLUTION INTRAVENOUS at 22:12

## 2024-09-15 RX ADMIN — MAGNESIUM OXIDE 400 MG (241.3 MG MAGNESIUM) TABLET 400 MG: TABLET at 22:12

## 2024-09-15 ASSESSMENT — PAIN DESCRIPTION - LOCATION: LOCATION: BACK;NECK;KNEE

## 2024-09-15 ASSESSMENT — LIFESTYLE VARIABLES
HOW MANY STANDARD DRINKS CONTAINING ALCOHOL DO YOU HAVE ON A TYPICAL DAY: PATIENT DOES NOT DRINK
HOW OFTEN DO YOU HAVE A DRINK CONTAINING ALCOHOL: NEVER

## 2024-09-15 ASSESSMENT — PAIN DESCRIPTION - ORIENTATION: ORIENTATION: LEFT;RIGHT

## 2024-09-15 ASSESSMENT — PAIN DESCRIPTION - DESCRIPTORS: DESCRIPTORS: ACHING;SORE;TENDER;THROBBING

## 2024-09-15 ASSESSMENT — PAIN DESCRIPTION - PAIN TYPE: TYPE: ACUTE PAIN

## 2024-09-15 ASSESSMENT — PAIN - FUNCTIONAL ASSESSMENT: PAIN_FUNCTIONAL_ASSESSMENT: 0-10

## 2024-09-15 ASSESSMENT — PAIN DESCRIPTION - FREQUENCY: FREQUENCY: CONTINUOUS

## 2024-09-15 ASSESSMENT — PAIN DESCRIPTION - ONSET: ONSET: ON-GOING

## 2024-09-15 ASSESSMENT — PAIN SCALES - GENERAL
PAINLEVEL_OUTOF10: 0
PAINLEVEL_OUTOF10: 10

## 2024-09-16 LAB
ANION GAP SERPL CALCULATED.3IONS-SCNC: 11 MMOL/L (ref 7–16)
BASOPHILS # BLD: 0.04 K/UL (ref 0–0.2)
BASOPHILS NFR BLD: 0 % (ref 0–2)
BUN SERPL-MCNC: 31 MG/DL (ref 6–23)
CALCIUM SERPL-MCNC: 8.1 MG/DL (ref 8.6–10.2)
CHLORIDE SERPL-SCNC: 106 MMOL/L (ref 98–107)
CO2 SERPL-SCNC: 23 MMOL/L (ref 22–29)
CREAT SERPL-MCNC: 1.7 MG/DL (ref 0.7–1.2)
CRP SERPL HS-MCNC: 134 MG/L (ref 0–5)
EKG ATRIAL RATE: 70 BPM
EKG Q-T INTERVAL: 514 MS
EKG QRS DURATION: 200 MS
EKG QTC CALCULATION (BAZETT): 555 MS
EKG R AXIS: -65 DEGREES
EKG T AXIS: 98 DEGREES
EKG VENTRICULAR RATE: 70 BPM
EOSINOPHIL # BLD: 0.09 K/UL (ref 0.05–0.5)
EOSINOPHILS RELATIVE PERCENT: 1 % (ref 0–6)
ERYTHROCYTE [DISTWIDTH] IN BLOOD BY AUTOMATED COUNT: 14.3 % (ref 11.5–15)
ERYTHROCYTE [SEDIMENTATION RATE] IN BLOOD BY WESTERGREN METHOD: 33 MM/HR (ref 0–15)
GFR, ESTIMATED: 38 ML/MIN/1.73M2
GLUCOSE SERPL-MCNC: 112 MG/DL (ref 74–99)
HCT VFR BLD AUTO: 29.7 % (ref 37–54)
HGB BLD-MCNC: 8.7 G/DL (ref 12.5–16.5)
IMM GRANULOCYTES # BLD AUTO: 0.13 K/UL (ref 0–0.58)
IMM GRANULOCYTES NFR BLD: 1 % (ref 0–5)
LACTATE BLDV-SCNC: 1 MMOL/L (ref 0.5–2.2)
LYMPHOCYTES NFR BLD: 4.74 K/UL (ref 1.5–4)
LYMPHOCYTES RELATIVE PERCENT: 26 % (ref 20–42)
MCH RBC QN AUTO: 29.4 PG (ref 26–35)
MCHC RBC AUTO-ENTMCNC: 29.3 G/DL (ref 32–34.5)
MCV RBC AUTO: 100.3 FL (ref 80–99.9)
MONOCYTES NFR BLD: 1.74 K/UL (ref 0.1–0.95)
MONOCYTES NFR BLD: 10 % (ref 2–12)
NEUTROPHILS NFR BLD: 63 % (ref 43–80)
NEUTS SEG NFR BLD: 11.58 K/UL (ref 1.8–7.3)
PLATELET # BLD AUTO: 220 K/UL (ref 130–450)
PMV BLD AUTO: 10 FL (ref 7–12)
POTASSIUM SERPL-SCNC: 4.4 MMOL/L (ref 3.5–5)
RBC # BLD AUTO: 2.96 M/UL (ref 3.8–5.8)
RBC # BLD: ABNORMAL 10*6/UL
SODIUM SERPL-SCNC: 140 MMOL/L (ref 132–146)
WBC OTHER # BLD: 18.3 K/UL (ref 4.5–11.5)

## 2024-09-16 PROCEDURE — 6370000000 HC RX 637 (ALT 250 FOR IP): Performed by: NURSE PRACTITIONER

## 2024-09-16 PROCEDURE — 97530 THERAPEUTIC ACTIVITIES: CPT

## 2024-09-16 PROCEDURE — 6370000000 HC RX 637 (ALT 250 FOR IP): Performed by: STUDENT IN AN ORGANIZED HEALTH CARE EDUCATION/TRAINING PROGRAM

## 2024-09-16 PROCEDURE — 85652 RBC SED RATE AUTOMATED: CPT

## 2024-09-16 PROCEDURE — 93010 ELECTROCARDIOGRAM REPORT: CPT | Performed by: INTERNAL MEDICINE

## 2024-09-16 PROCEDURE — 2500000003 HC RX 250 WO HCPCS: Performed by: STUDENT IN AN ORGANIZED HEALTH CARE EDUCATION/TRAINING PROGRAM

## 2024-09-16 PROCEDURE — 85025 COMPLETE CBC W/AUTO DIFF WBC: CPT

## 2024-09-16 PROCEDURE — 97161 PT EVAL LOW COMPLEX 20 MIN: CPT

## 2024-09-16 PROCEDURE — 2580000003 HC RX 258: Performed by: NURSE PRACTITIONER

## 2024-09-16 PROCEDURE — 6370000000 HC RX 637 (ALT 250 FOR IP)

## 2024-09-16 PROCEDURE — 80048 BASIC METABOLIC PNL TOTAL CA: CPT

## 2024-09-16 PROCEDURE — 6360000002 HC RX W HCPCS: Performed by: NURSE PRACTITIONER

## 2024-09-16 PROCEDURE — 86140 C-REACTIVE PROTEIN: CPT

## 2024-09-16 PROCEDURE — 83605 ASSAY OF LACTIC ACID: CPT

## 2024-09-16 PROCEDURE — 2060000000 HC ICU INTERMEDIATE R&B

## 2024-09-16 PROCEDURE — 99232 SBSQ HOSP IP/OBS MODERATE 35: CPT | Performed by: STUDENT IN AN ORGANIZED HEALTH CARE EDUCATION/TRAINING PROGRAM

## 2024-09-16 RX ORDER — 0.9 % SODIUM CHLORIDE 0.9 %
1000 INTRAVENOUS SOLUTION INTRAVENOUS ONCE
Status: COMPLETED | OUTPATIENT
Start: 2024-09-16 | End: 2024-09-16

## 2024-09-16 RX ADMIN — SODIUM CHLORIDE 1000 ML: 9 INJECTION, SOLUTION INTRAVENOUS at 04:59

## 2024-09-16 RX ADMIN — MICONAZOLE NITRATE: 20 POWDER TOPICAL at 20:49

## 2024-09-16 RX ADMIN — APIXABAN 2.5 MG: 2.5 TABLET, FILM COATED ORAL at 20:50

## 2024-09-16 RX ADMIN — SERTRALINE HYDROCHLORIDE 25 MG: 50 TABLET ORAL at 09:36

## 2024-09-16 RX ADMIN — METOPROLOL TARTRATE 12.5 MG: 25 TABLET, FILM COATED ORAL at 09:37

## 2024-09-16 RX ADMIN — MAGNESIUM OXIDE 400 MG (241.3 MG MAGNESIUM) TABLET 400 MG: TABLET at 09:36

## 2024-09-16 RX ADMIN — MAGNESIUM OXIDE 400 MG (241.3 MG MAGNESIUM) TABLET 400 MG: TABLET at 20:50

## 2024-09-16 RX ADMIN — MYCOPHENOLATE MOFETIL 250 MG: 250 CAPSULE ORAL at 09:37

## 2024-09-16 RX ADMIN — MENTHOL AND METHYL SALICYLATE: 7.6; 29 OINTMENT TOPICAL at 20:49

## 2024-09-16 RX ADMIN — FERROUS SULFATE TAB 325 MG (65 MG ELEMENTAL FE) 325 MG: 325 (65 FE) TAB at 09:37

## 2024-09-16 RX ADMIN — SODIUM CHLORIDE: 9 INJECTION, SOLUTION INTRAVENOUS at 14:21

## 2024-09-16 RX ADMIN — CEFEPIME 1000 MG: 1 INJECTION, POWDER, FOR SOLUTION INTRAMUSCULAR; INTRAVENOUS at 16:51

## 2024-09-16 RX ADMIN — CLOPIDOGREL BISULFATE 75 MG: 75 TABLET ORAL at 09:36

## 2024-09-16 RX ADMIN — MYCOPHENOLATE MOFETIL 250 MG: 250 CAPSULE ORAL at 20:50

## 2024-09-16 RX ADMIN — Medication 5000 UNITS: at 09:36

## 2024-09-16 RX ADMIN — PANTOPRAZOLE SODIUM 40 MG: 40 TABLET, DELAYED RELEASE ORAL at 05:28

## 2024-09-16 RX ADMIN — DESMOPRESSIN ACETATE 80 MG: 0.2 TABLET ORAL at 20:50

## 2024-09-16 ASSESSMENT — PAIN SCALES - GENERAL
PAINLEVEL_OUTOF10: 0

## 2024-09-17 LAB
ANION GAP SERPL CALCULATED.3IONS-SCNC: 11 MMOL/L (ref 7–16)
BASOPHILS # BLD: 0.02 K/UL (ref 0–0.2)
BASOPHILS NFR BLD: 0 % (ref 0–2)
BUN SERPL-MCNC: 31 MG/DL (ref 6–23)
CALCIUM SERPL-MCNC: 8.2 MG/DL (ref 8.6–10.2)
CHLORIDE SERPL-SCNC: 107 MMOL/L (ref 98–107)
CO2 SERPL-SCNC: 19 MMOL/L (ref 22–29)
CREAT SERPL-MCNC: 1.5 MG/DL (ref 0.7–1.2)
EOSINOPHIL # BLD: 0.18 K/UL (ref 0.05–0.5)
EOSINOPHILS RELATIVE PERCENT: 2 % (ref 0–6)
ERYTHROCYTE [DISTWIDTH] IN BLOOD BY AUTOMATED COUNT: 13.9 % (ref 11.5–15)
GFR, ESTIMATED: 46 ML/MIN/1.73M2
GLUCOSE SERPL-MCNC: 113 MG/DL (ref 74–99)
HCT VFR BLD AUTO: 29.6 % (ref 37–54)
HGB BLD-MCNC: 8.7 G/DL (ref 12.5–16.5)
IMM GRANULOCYTES # BLD AUTO: 0.03 K/UL (ref 0–0.58)
IMM GRANULOCYTES NFR BLD: 0 % (ref 0–5)
LYMPHOCYTES NFR BLD: 3.56 K/UL (ref 1.5–4)
LYMPHOCYTES RELATIVE PERCENT: 30 % (ref 20–42)
MCH RBC QN AUTO: 29.3 PG (ref 26–35)
MCHC RBC AUTO-ENTMCNC: 29.4 G/DL (ref 32–34.5)
MCV RBC AUTO: 99.7 FL (ref 80–99.9)
MONOCYTES NFR BLD: 1.02 K/UL (ref 0.1–0.95)
MONOCYTES NFR BLD: 9 % (ref 2–12)
NEUTROPHILS NFR BLD: 60 % (ref 43–80)
NEUTS SEG NFR BLD: 7.1 K/UL (ref 1.8–7.3)
PLATELET # BLD AUTO: 224 K/UL (ref 130–450)
PMV BLD AUTO: 9.6 FL (ref 7–12)
POTASSIUM SERPL-SCNC: 3.9 MMOL/L (ref 3.5–5)
RBC # BLD AUTO: 2.97 M/UL (ref 3.8–5.8)
SODIUM SERPL-SCNC: 137 MMOL/L (ref 132–146)
WBC OTHER # BLD: 11.9 K/UL (ref 4.5–11.5)

## 2024-09-17 PROCEDURE — 99232 SBSQ HOSP IP/OBS MODERATE 35: CPT | Performed by: STUDENT IN AN ORGANIZED HEALTH CARE EDUCATION/TRAINING PROGRAM

## 2024-09-17 PROCEDURE — 97530 THERAPEUTIC ACTIVITIES: CPT

## 2024-09-17 PROCEDURE — 6370000000 HC RX 637 (ALT 250 FOR IP): Performed by: STUDENT IN AN ORGANIZED HEALTH CARE EDUCATION/TRAINING PROGRAM

## 2024-09-17 PROCEDURE — 6370000000 HC RX 637 (ALT 250 FOR IP): Performed by: NURSE PRACTITIONER

## 2024-09-17 PROCEDURE — 80048 BASIC METABOLIC PNL TOTAL CA: CPT

## 2024-09-17 PROCEDURE — 2060000000 HC ICU INTERMEDIATE R&B

## 2024-09-17 PROCEDURE — 97165 OT EVAL LOW COMPLEX 30 MIN: CPT

## 2024-09-17 PROCEDURE — 85025 COMPLETE CBC W/AUTO DIFF WBC: CPT

## 2024-09-17 PROCEDURE — 6360000002 HC RX W HCPCS: Performed by: NURSE PRACTITIONER

## 2024-09-17 PROCEDURE — 2580000003 HC RX 258: Performed by: NURSE PRACTITIONER

## 2024-09-17 RX ADMIN — MAGNESIUM OXIDE 400 MG (241.3 MG MAGNESIUM) TABLET 400 MG: TABLET at 09:26

## 2024-09-17 RX ADMIN — PANTOPRAZOLE SODIUM 40 MG: 40 TABLET, DELAYED RELEASE ORAL at 06:28

## 2024-09-17 RX ADMIN — MAGNESIUM OXIDE 400 MG (241.3 MG MAGNESIUM) TABLET 400 MG: TABLET at 20:07

## 2024-09-17 RX ADMIN — MYCOPHENOLATE MOFETIL 250 MG: 250 CAPSULE ORAL at 09:27

## 2024-09-17 RX ADMIN — APIXABAN 2.5 MG: 2.5 TABLET, FILM COATED ORAL at 20:07

## 2024-09-17 RX ADMIN — Medication 5000 UNITS: at 09:26

## 2024-09-17 RX ADMIN — APIXABAN 2.5 MG: 2.5 TABLET, FILM COATED ORAL at 10:29

## 2024-09-17 RX ADMIN — METOPROLOL TARTRATE 12.5 MG: 25 TABLET, FILM COATED ORAL at 09:26

## 2024-09-17 RX ADMIN — DESMOPRESSIN ACETATE 80 MG: 0.2 TABLET ORAL at 20:07

## 2024-09-17 RX ADMIN — ACETAMINOPHEN 650 MG: 325 TABLET ORAL at 15:07

## 2024-09-17 RX ADMIN — MYCOPHENOLATE MOFETIL 250 MG: 250 CAPSULE ORAL at 20:06

## 2024-09-17 RX ADMIN — CLOPIDOGREL BISULFATE 75 MG: 75 TABLET ORAL at 09:26

## 2024-09-17 RX ADMIN — SERTRALINE HYDROCHLORIDE 25 MG: 50 TABLET ORAL at 09:26

## 2024-09-17 RX ADMIN — CEFEPIME 1000 MG: 1 INJECTION, POWDER, FOR SOLUTION INTRAMUSCULAR; INTRAVENOUS at 16:26

## 2024-09-17 RX ADMIN — MICONAZOLE NITRATE: 20 POWDER TOPICAL at 20:07

## 2024-09-17 RX ADMIN — FERROUS SULFATE TAB 325 MG (65 MG ELEMENTAL FE) 325 MG: 325 (65 FE) TAB at 09:26

## 2024-09-17 RX ADMIN — MICONAZOLE NITRATE: 20 POWDER TOPICAL at 09:28

## 2024-09-17 ASSESSMENT — PAIN SCALES - GENERAL: PAINLEVEL_OUTOF10: 4

## 2024-09-17 ASSESSMENT — PAIN DESCRIPTION - LOCATION: LOCATION: HEAD

## 2024-09-17 ASSESSMENT — PAIN DESCRIPTION - DESCRIPTORS: DESCRIPTORS: ACHING

## 2024-09-17 ASSESSMENT — PAIN DESCRIPTION - ORIENTATION: ORIENTATION: ANTERIOR

## 2024-09-18 VITALS
WEIGHT: 164 LBS | TEMPERATURE: 98.4 F | SYSTOLIC BLOOD PRESSURE: 133 MMHG | HEART RATE: 70 BPM | HEIGHT: 68 IN | BODY MASS INDEX: 24.86 KG/M2 | DIASTOLIC BLOOD PRESSURE: 63 MMHG | OXYGEN SATURATION: 100 % | RESPIRATION RATE: 18 BRPM

## 2024-09-18 LAB
ANION GAP SERPL CALCULATED.3IONS-SCNC: 11 MMOL/L (ref 7–16)
BASOPHILS # BLD: 0.02 K/UL (ref 0–0.2)
BASOPHILS NFR BLD: 0 % (ref 0–2)
BUN SERPL-MCNC: 26 MG/DL (ref 6–23)
CALCIUM SERPL-MCNC: 8.2 MG/DL (ref 8.6–10.2)
CHLORIDE SERPL-SCNC: 108 MMOL/L (ref 98–107)
CO2 SERPL-SCNC: 19 MMOL/L (ref 22–29)
CREAT SERPL-MCNC: 1.3 MG/DL (ref 0.7–1.2)
EOSINOPHIL # BLD: 0.18 K/UL (ref 0.05–0.5)
EOSINOPHILS RELATIVE PERCENT: 2 % (ref 0–6)
ERYTHROCYTE [DISTWIDTH] IN BLOOD BY AUTOMATED COUNT: 14 % (ref 11.5–15)
GFR, ESTIMATED: 51 ML/MIN/1.73M2
GLUCOSE SERPL-MCNC: 128 MG/DL (ref 74–99)
HCT VFR BLD AUTO: 29.5 % (ref 37–54)
HGB BLD-MCNC: 8.7 G/DL (ref 12.5–16.5)
IMM GRANULOCYTES # BLD AUTO: <0.03 K/UL (ref 0–0.58)
IMM GRANULOCYTES NFR BLD: 0 % (ref 0–5)
LYMPHOCYTES NFR BLD: 2.77 K/UL (ref 1.5–4)
LYMPHOCYTES RELATIVE PERCENT: 35 % (ref 20–42)
MCH RBC QN AUTO: 29 PG (ref 26–35)
MCHC RBC AUTO-ENTMCNC: 29.5 G/DL (ref 32–34.5)
MCV RBC AUTO: 98.3 FL (ref 80–99.9)
MICROORGANISM SPEC CULT: ABNORMAL
MONOCYTES NFR BLD: 0.95 K/UL (ref 0.1–0.95)
MONOCYTES NFR BLD: 12 % (ref 2–12)
NEUTROPHILS NFR BLD: 50 % (ref 43–80)
NEUTS SEG NFR BLD: 3.9 K/UL (ref 1.8–7.3)
PLATELET # BLD AUTO: 251 K/UL (ref 130–450)
PMV BLD AUTO: 9.6 FL (ref 7–12)
POTASSIUM SERPL-SCNC: 4.1 MMOL/L (ref 3.5–5)
RBC # BLD AUTO: 3 M/UL (ref 3.8–5.8)
SERVICE CMNT-IMP: ABNORMAL
SODIUM SERPL-SCNC: 138 MMOL/L (ref 132–146)
SPECIMEN DESCRIPTION: ABNORMAL
WBC OTHER # BLD: 7.8 K/UL (ref 4.5–11.5)

## 2024-09-18 PROCEDURE — 80048 BASIC METABOLIC PNL TOTAL CA: CPT

## 2024-09-18 PROCEDURE — 6360000002 HC RX W HCPCS: Performed by: NURSE PRACTITIONER

## 2024-09-18 PROCEDURE — 6370000000 HC RX 637 (ALT 250 FOR IP): Performed by: STUDENT IN AN ORGANIZED HEALTH CARE EDUCATION/TRAINING PROGRAM

## 2024-09-18 PROCEDURE — 6370000000 HC RX 637 (ALT 250 FOR IP): Performed by: NURSE PRACTITIONER

## 2024-09-18 PROCEDURE — 99239 HOSP IP/OBS DSCHRG MGMT >30: CPT | Performed by: STUDENT IN AN ORGANIZED HEALTH CARE EDUCATION/TRAINING PROGRAM

## 2024-09-18 PROCEDURE — 85025 COMPLETE CBC W/AUTO DIFF WBC: CPT

## 2024-09-18 RX ADMIN — MAGNESIUM OXIDE 400 MG (241.3 MG MAGNESIUM) TABLET 400 MG: TABLET at 11:43

## 2024-09-18 RX ADMIN — APIXABAN 5 MG: 5 TABLET, FILM COATED ORAL at 11:42

## 2024-09-18 RX ADMIN — MYCOPHENOLATE MOFETIL 250 MG: 250 CAPSULE ORAL at 11:43

## 2024-09-18 RX ADMIN — MICONAZOLE NITRATE: 20 POWDER TOPICAL at 11:48

## 2024-09-18 RX ADMIN — CLOPIDOGREL BISULFATE 75 MG: 75 TABLET ORAL at 11:44

## 2024-09-18 RX ADMIN — SERTRALINE HYDROCHLORIDE 25 MG: 50 TABLET ORAL at 11:43

## 2024-09-18 RX ADMIN — FERROUS SULFATE TAB 325 MG (65 MG ELEMENTAL FE) 325 MG: 325 (65 FE) TAB at 11:42

## 2024-09-18 RX ADMIN — METOPROLOL TARTRATE 12.5 MG: 25 TABLET, FILM COATED ORAL at 11:43

## 2024-09-18 RX ADMIN — Medication 5000 UNITS: at 11:44

## 2024-09-18 RX ADMIN — PANTOPRAZOLE SODIUM 40 MG: 40 TABLET, DELAYED RELEASE ORAL at 05:53

## 2024-09-28 ENCOUNTER — HOSPITAL ENCOUNTER (EMERGENCY)
Age: 89
Discharge: HOME | End: 2024-09-28
Attending: EMERGENCY MEDICINE
Payer: OTHER GOVERNMENT

## 2024-09-28 ENCOUNTER — APPOINTMENT (OUTPATIENT)
Dept: GENERAL RADIOLOGY | Age: 89
End: 2024-09-28
Payer: OTHER GOVERNMENT

## 2024-09-28 ENCOUNTER — APPOINTMENT (OUTPATIENT)
Dept: CT IMAGING | Age: 89
End: 2024-09-28
Payer: OTHER GOVERNMENT

## 2024-09-28 VITALS
HEART RATE: 70 BPM | TEMPERATURE: 97.8 F | SYSTOLIC BLOOD PRESSURE: 157 MMHG | OXYGEN SATURATION: 98 % | DIASTOLIC BLOOD PRESSURE: 68 MMHG | RESPIRATION RATE: 18 BRPM

## 2024-09-28 DIAGNOSIS — N30.00 ACUTE CYSTITIS WITHOUT HEMATURIA: ICD-10-CM

## 2024-09-28 DIAGNOSIS — J40 BRONCHITIS: ICD-10-CM

## 2024-09-28 DIAGNOSIS — K52.9 COLITIS: ICD-10-CM

## 2024-09-28 DIAGNOSIS — R42 DIZZINESS: Primary | ICD-10-CM

## 2024-09-28 LAB
ALBUMIN SERPL-MCNC: 3.9 G/DL (ref 3.5–5.2)
ALP SERPL-CCNC: 99 U/L (ref 40–129)
ALT SERPL-CCNC: 6 U/L (ref 0–40)
ANION GAP SERPL CALCULATED.3IONS-SCNC: 18 MMOL/L (ref 7–16)
AST SERPL-CCNC: 19 U/L (ref 0–39)
BACTERIA URNS QL MICRO: ABNORMAL
BASOPHILS # BLD: 0.04 K/UL (ref 0–0.2)
BASOPHILS NFR BLD: 0 % (ref 0–2)
BILIRUB SERPL-MCNC: 0.5 MG/DL (ref 0–1.2)
BILIRUB UR QL STRIP: NEGATIVE
BNP SERPL-MCNC: 2894 PG/ML (ref 0–450)
BUN SERPL-MCNC: 17 MG/DL (ref 6–23)
CALCIUM SERPL-MCNC: 9.2 MG/DL (ref 8.6–10.2)
CHLORIDE SERPL-SCNC: 106 MMOL/L (ref 98–107)
CLARITY UR: ABNORMAL
CO2 SERPL-SCNC: 17 MMOL/L (ref 22–29)
COLOR UR: YELLOW
CREAT SERPL-MCNC: 1.2 MG/DL (ref 0.7–1.2)
EKG ATRIAL RATE: 70 BPM
EKG Q-T INTERVAL: 510 MS
EKG QRS DURATION: 198 MS
EKG QTC CALCULATION (BAZETT): 550 MS
EKG R AXIS: -66 DEGREES
EKG T AXIS: 105 DEGREES
EKG VENTRICULAR RATE: 70 BPM
EOSINOPHIL # BLD: 0.15 K/UL (ref 0.05–0.5)
EOSINOPHILS RELATIVE PERCENT: 1 % (ref 0–6)
ERYTHROCYTE [DISTWIDTH] IN BLOOD BY AUTOMATED COUNT: 13.6 % (ref 11.5–15)
GFR, ESTIMATED: 59 ML/MIN/1.73M2
GLUCOSE SERPL-MCNC: 115 MG/DL (ref 74–99)
GLUCOSE UR STRIP-MCNC: NEGATIVE MG/DL
HCT VFR BLD AUTO: 36.7 % (ref 37–54)
HGB BLD-MCNC: 11 G/DL (ref 12.5–16.5)
HGB UR QL STRIP.AUTO: ABNORMAL
IMM GRANULOCYTES # BLD AUTO: 0.06 K/UL (ref 0–0.58)
IMM GRANULOCYTES NFR BLD: 0 % (ref 0–5)
KETONES UR STRIP-MCNC: NEGATIVE MG/DL
LACTATE BLDV-SCNC: 1.4 MMOL/L (ref 0.5–2.2)
LEUKOCYTE ESTERASE UR QL STRIP: ABNORMAL
LIPASE SERPL-CCNC: 35 U/L (ref 13–60)
LYMPHOCYTES NFR BLD: 3.71 K/UL (ref 1.5–4)
LYMPHOCYTES RELATIVE PERCENT: 23 % (ref 20–42)
MCH RBC QN AUTO: 28.8 PG (ref 26–35)
MCHC RBC AUTO-ENTMCNC: 30 G/DL (ref 32–34.5)
MCV RBC AUTO: 96.1 FL (ref 80–99.9)
MONOCYTES NFR BLD: 1.19 K/UL (ref 0.1–0.95)
MONOCYTES NFR BLD: 8 % (ref 2–12)
NEUTROPHILS NFR BLD: 68 % (ref 43–80)
NEUTS SEG NFR BLD: 10.72 K/UL (ref 1.8–7.3)
NITRITE UR QL STRIP: POSITIVE
PH UR STRIP: 5.5 [PH] (ref 5–9)
PLATELET # BLD AUTO: 416 K/UL (ref 130–450)
PMV BLD AUTO: 9 FL (ref 7–12)
POTASSIUM SERPL-SCNC: 4.4 MMOL/L (ref 3.5–5)
PROT SERPL-MCNC: 7.1 G/DL (ref 6.4–8.3)
PROT UR STRIP-MCNC: 30 MG/DL
RBC # BLD AUTO: 3.82 M/UL (ref 3.8–5.8)
RBC #/AREA URNS HPF: ABNORMAL /HPF
SODIUM SERPL-SCNC: 141 MMOL/L (ref 132–146)
SP GR UR STRIP: 1.01 (ref 1–1.03)
TROPONIN I SERPL HS-MCNC: 38 NG/L (ref 0–11)
TROPONIN I SERPL HS-MCNC: 39 NG/L (ref 0–11)
UROBILINOGEN UR STRIP-ACNC: 0.2 EU/DL (ref 0–1)
WBC #/AREA URNS HPF: ABNORMAL /HPF
WBC OTHER # BLD: 15.9 K/UL (ref 4.5–11.5)

## 2024-09-28 PROCEDURE — 83605 ASSAY OF LACTIC ACID: CPT

## 2024-09-28 PROCEDURE — 93005 ELECTROCARDIOGRAM TRACING: CPT | Performed by: EMERGENCY MEDICINE

## 2024-09-28 PROCEDURE — 6360000004 HC RX CONTRAST MEDICATION: Performed by: RADIOLOGY

## 2024-09-28 PROCEDURE — 6370000000 HC RX 637 (ALT 250 FOR IP): Performed by: EMERGENCY MEDICINE

## 2024-09-28 PROCEDURE — 83690 ASSAY OF LIPASE: CPT

## 2024-09-28 PROCEDURE — 80053 COMPREHEN METABOLIC PANEL: CPT

## 2024-09-28 PROCEDURE — 70450 CT HEAD/BRAIN W/O DYE: CPT

## 2024-09-28 PROCEDURE — 74177 CT ABD & PELVIS W/CONTRAST: CPT

## 2024-09-28 PROCEDURE — 81001 URINALYSIS AUTO W/SCOPE: CPT

## 2024-09-28 PROCEDURE — 96374 THER/PROPH/DIAG INJ IV PUSH: CPT

## 2024-09-28 PROCEDURE — 6360000002 HC RX W HCPCS: Performed by: EMERGENCY MEDICINE

## 2024-09-28 PROCEDURE — 71045 X-RAY EXAM CHEST 1 VIEW: CPT

## 2024-09-28 PROCEDURE — 83880 ASSAY OF NATRIURETIC PEPTIDE: CPT

## 2024-09-28 PROCEDURE — 99285 EMERGENCY DEPT VISIT HI MDM: CPT

## 2024-09-28 PROCEDURE — 84484 ASSAY OF TROPONIN QUANT: CPT

## 2024-09-28 PROCEDURE — 2580000003 HC RX 258: Performed by: EMERGENCY MEDICINE

## 2024-09-28 PROCEDURE — 85025 COMPLETE CBC W/AUTO DIFF WBC: CPT

## 2024-09-28 RX ORDER — 0.9 % SODIUM CHLORIDE 0.9 %
500 INTRAVENOUS SOLUTION INTRAVENOUS ONCE
Status: COMPLETED | OUTPATIENT
Start: 2024-09-28 | End: 2024-09-28

## 2024-09-28 RX ORDER — METRONIDAZOLE 500 MG/1
500 TABLET ORAL 3 TIMES DAILY
Qty: 30 TABLET | Refills: 0 | Status: SHIPPED | OUTPATIENT
Start: 2024-09-28 | End: 2024-10-08

## 2024-09-28 RX ORDER — METRONIDAZOLE 500 MG/1
500 TABLET ORAL ONCE
Status: COMPLETED | OUTPATIENT
Start: 2024-09-28 | End: 2024-09-28

## 2024-09-28 RX ORDER — CEFDINIR 300 MG/1
300 CAPSULE ORAL 2 TIMES DAILY
Qty: 20 CAPSULE | Refills: 0 | Status: SHIPPED | OUTPATIENT
Start: 2024-09-28 | End: 2024-10-08

## 2024-09-28 RX ORDER — IOPAMIDOL 755 MG/ML
75 INJECTION, SOLUTION INTRAVASCULAR
Status: COMPLETED | OUTPATIENT
Start: 2024-09-28 | End: 2024-09-28

## 2024-09-28 RX ADMIN — SODIUM CHLORIDE 500 ML: 9 INJECTION, SOLUTION INTRAVENOUS at 11:44

## 2024-09-28 RX ADMIN — METRONIDAZOLE 500 MG: 500 TABLET ORAL at 17:12

## 2024-09-28 RX ADMIN — IOPAMIDOL 75 ML: 755 INJECTION, SOLUTION INTRAVENOUS at 11:55

## 2024-09-28 RX ADMIN — WATER 1000 MG: 1 INJECTION INTRAMUSCULAR; INTRAVENOUS; SUBCUTANEOUS at 17:12

## 2024-09-28 ASSESSMENT — ENCOUNTER SYMPTOMS
EYE REDNESS: 0
SHORTNESS OF BREATH: 0
ABDOMINAL PAIN: 1
VOMITING: 0
NAUSEA: 1

## 2024-09-28 NOTE — ED NOTES
Ambulated patient around department, SpO2 stayed between 96 & 97. HR between 70 to 78. No complaints during ambulation, felt a little dizzy getting up at first then went away.

## 2024-09-28 NOTE — ED PROVIDER NOTES
[facility] EMERGENCY DEPARTMENT  EMERGENCY DEPARTMENT ENCOUNTER        Pt Name: Reese Montana  MRN: [id number]  Birthdate 1935  Date of evaluation: 9/28/2024  Provider: Mónica Baig DO  PCP: Shannan Mcgill MD  Note Started: 10:19 AM EDT 9/28/24    CHIEF COMPLAINT       Chief Complaint   Patient presents with    Dizziness     Pt reports he feels like he is falling at times.  He reports was in hospital for 3 days last week for uti and having intermittent urgency and burning when he pees.  Loss of balance       HISTORY OF PRESENT ILLNESS: 1 or more Elements       Reese Montana is a 89 y.o. male who presents to the emergency department the chief complaint of dizziness.  History is obtained from patient as well as patient's daughter.  Patient presenting emergency department the chief complaint of dizziness.  Patient was recently admitted to the hospital 9/15 to 9/18 for complicated UTI.  The patient was sent home with home health.  He declined skilled nursing facility.  The patient states that he is dizzy.  He feels this has somewhat lightheaded and feeling off balance.  He states is worse when he stands up.  He has not had any falls or syncopal episode secondary to this.  He does complain of mild associated nausea and states that he has an mild \"uncomfortable\" sensation in his abdomen as well as a \"uncomfortable \"sensation in his head.  He denies any numbness, weakness or paresthesias of his extremities    Nursing Notes were all reviewed and agreed with or any disagreements were addressed in the HPI.    REVIEW OF SYSTEMS :      Review of Systems   Constitutional:  Negative for fever.   HENT:  Negative for congestion.    Eyes:  Negative for redness.   Respiratory:  Negative for shortness of breath.    Cardiovascular:  Negative for chest pain.   Gastrointestinal:  Positive for abdominal pain and nausea. Negative for vomiting.   Genitourinary:  Negative for dysuria.    Ventricular paced rhythm rate of 70, no ST segment elevation, Sgarbossa criteria negative, QRS duration 198 MS, QTc 550 MS    Pt will be d/c and will follow up with his PCP . He is educated on signs and symptoms that require emergent evaluation. Pt is advised to return to the ED if his symptoms change or worsen. If his pain persists, pt may need further evaluation. Pt is agreeable to plan and all questions have been answered at this time.      PATIENT REFERRED TO:  Shannan Mcgill MD  [address] [id number] [phone]    Call in 1 day      Shannan Mcgill MD  [address] [id number] [phone]    Call in 1 day        DISCHARGE MEDICATIONS:  Discharge Medication List as of 9/28/2024  5:01 PM        START taking these medications    Details   cefdinir (OMNICEF) 300 MG capsule Take 1 capsule by mouth 2 times daily for 10 days, Disp-20 capsule, R-0Normal      metroNIDAZOLE (FLAGYL) 500 MG tablet Take 1 tablet by mouth 3 times daily for 10 days, Disp-30 tablet, R-0Normal                 EMERGENCY DEPARTMENT COURSE    Vitals:    Vitals:    09/28/24 0923   BP: (!) 157/68   Pulse: 70   Resp: 18   Temp: 97.8 °F (36.6 °C)   TempSrc: Oral   SpO2: 98%       ED Course as of 09/30/24 0923   Sat Sep 28, 2024   1619 Patient offered admission and he declines.  He states he would like for discharge home.  I stated if patient is able to ambulate without difficulty he feels comfortable he can be discharged home. [MT]      ED Course User Index  [MT] Mónica Baig DO          I am the Primary Clinician of Record.    FINAL IMPRESSION      1. Dizziness    2. Bronchitis    3. Colitis    4. Acute cystitis without hematuria          DISPOSITION/PLAN      Decision To Discharge 09/28/2024 05:23:55 PM    Patient's disposition: Discharge to home  Patient's condition is stable.           (Please note that portions of this note were completed with a voice recognition program.  Efforts were made to

## 2024-10-01 LAB
EKG ATRIAL RATE: 70 BPM
EKG Q-T INTERVAL: 510 MS
EKG QRS DURATION: 198 MS
EKG QTC CALCULATION (BAZETT): 550 MS
EKG R AXIS: -66 DEGREES
EKG T AXIS: 105 DEGREES
EKG VENTRICULAR RATE: 70 BPM

## 2025-01-28 ENCOUNTER — APPOINTMENT (OUTPATIENT)
Dept: GENERAL RADIOLOGY | Age: 89
End: 2025-01-28
Payer: OTHER GOVERNMENT

## 2025-01-28 ENCOUNTER — HOSPITAL ENCOUNTER (EMERGENCY)
Age: 89
Discharge: HOME | End: 2025-01-28
Attending: EMERGENCY MEDICINE
Payer: OTHER GOVERNMENT

## 2025-01-28 ENCOUNTER — APPOINTMENT (OUTPATIENT)
Dept: CT IMAGING | Age: 89
End: 2025-01-28
Payer: OTHER GOVERNMENT

## 2025-01-28 ENCOUNTER — APPOINTMENT (OUTPATIENT)
Dept: DERMATOLOGY | Facility: CLINIC | Age: 89
End: 2025-01-28
Payer: MEDICARE

## 2025-01-28 VITALS
DIASTOLIC BLOOD PRESSURE: 73 MMHG | TEMPERATURE: 98.7 F | WEIGHT: 150 LBS | BODY MASS INDEX: 22.73 KG/M2 | OXYGEN SATURATION: 96 % | HEIGHT: 68 IN | RESPIRATION RATE: 20 BRPM | HEART RATE: 70 BPM | SYSTOLIC BLOOD PRESSURE: 148 MMHG

## 2025-01-28 DIAGNOSIS — R42 DIZZINESS: Primary | ICD-10-CM

## 2025-01-28 LAB
ALBUMIN SERPL-MCNC: 4.2 G/DL (ref 3.5–5.2)
ALP SERPL-CCNC: 103 U/L (ref 40–129)
ALT SERPL-CCNC: 11 U/L (ref 0–40)
ANION GAP SERPL CALCULATED.3IONS-SCNC: 14 MMOL/L (ref 7–16)
AST SERPL-CCNC: 20 U/L (ref 0–39)
BACTERIA URNS QL MICRO: ABNORMAL
BASOPHILS # BLD: 0 K/UL (ref 0–0.2)
BASOPHILS NFR BLD: 0 % (ref 0–2)
BILIRUB SERPL-MCNC: 0.5 MG/DL (ref 0–1.2)
BILIRUB UR QL STRIP: NEGATIVE
BUN SERPL-MCNC: 27 MG/DL (ref 6–23)
CALCIUM SERPL-MCNC: 9 MG/DL (ref 8.6–10.2)
CHLORIDE SERPL-SCNC: 105 MMOL/L (ref 98–107)
CLARITY UR: CLEAR
CO2 SERPL-SCNC: 23 MMOL/L (ref 22–29)
COLOR UR: YELLOW
CREAT SERPL-MCNC: 1.3 MG/DL (ref 0.7–1.2)
EOSINOPHIL # BLD: 0 K/UL (ref 0.05–0.5)
EOSINOPHILS RELATIVE PERCENT: 0 % (ref 0–6)
ERYTHROCYTE [DISTWIDTH] IN BLOOD BY AUTOMATED COUNT: 13.2 % (ref 11.5–15)
FLUAV RNA RESP QL NAA+PROBE: NOT DETECTED
FLUBV RNA RESP QL NAA+PROBE: NOT DETECTED
GFR, ESTIMATED: 55 ML/MIN/1.73M2
GLUCOSE SERPL-MCNC: 177 MG/DL (ref 74–99)
GLUCOSE UR STRIP-MCNC: NEGATIVE MG/DL
HCT VFR BLD AUTO: 43.1 % (ref 37–54)
HGB BLD-MCNC: 13.6 G/DL (ref 12.5–16.5)
HGB UR QL STRIP.AUTO: ABNORMAL
INR PPP: 1.5
KETONES UR STRIP-MCNC: NEGATIVE MG/DL
LACTATE BLDV-SCNC: 0.9 MMOL/L (ref 0.5–2.2)
LEUKOCYTE ESTERASE UR QL STRIP: NEGATIVE
LIPASE SERPL-CCNC: 32 U/L (ref 13–60)
LYMPHOCYTES NFR BLD: 5.04 K/UL (ref 1.5–4)
LYMPHOCYTES RELATIVE PERCENT: 52 % (ref 20–42)
MAGNESIUM SERPL-MCNC: 1.5 MG/DL (ref 1.6–2.6)
MCH RBC QN AUTO: 28.8 PG (ref 26–35)
MCHC RBC AUTO-ENTMCNC: 31.6 G/DL (ref 32–34.5)
MCV RBC AUTO: 91.1 FL (ref 80–99.9)
MONOCYTES NFR BLD: 0.39 K/UL (ref 0.1–0.95)
MONOCYTES NFR BLD: 4 % (ref 2–12)
NEUTROPHILS NFR BLD: 44 % (ref 43–80)
NEUTS SEG NFR BLD: 4.27 K/UL (ref 1.8–7.3)
NITRITE UR QL STRIP: NEGATIVE
PARTIAL THROMBOPLASTIN TIME: 36 SEC (ref 24.5–35.1)
PH UR STRIP: 5.5 [PH] (ref 5–9)
PLATELET # BLD AUTO: 215 K/UL (ref 130–450)
PMV BLD AUTO: 8.9 FL (ref 7–12)
POTASSIUM SERPL-SCNC: 4 MMOL/L (ref 3.5–5)
PROT SERPL-MCNC: 7.1 G/DL (ref 6.4–8.3)
PROT UR STRIP-MCNC: 30 MG/DL
PROTHROMBIN TIME: 16.4 SEC (ref 9.3–12.4)
RBC # BLD AUTO: 4.73 M/UL (ref 3.8–5.8)
RBC #/AREA URNS HPF: ABNORMAL /HPF
SARS-COV-2 RNA RESP QL NAA+PROBE: NOT DETECTED
SODIUM SERPL-SCNC: 142 MMOL/L (ref 132–146)
SOURCE: NORMAL
SP GR UR STRIP: 1.02 (ref 1–1.03)
SPECIMEN DESCRIPTION: NORMAL
TROPONIN I SERPL HS-MCNC: 34 NG/L (ref 0–11)
TROPONIN I SERPL HS-MCNC: 35 NG/L (ref 0–11)
UROBILINOGEN UR STRIP-ACNC: 0.2 EU/DL (ref 0–1)
WBC #/AREA URNS HPF: ABNORMAL /HPF
WBC OTHER # BLD: 9.7 K/UL (ref 4.5–11.5)

## 2025-01-28 PROCEDURE — 85610 PROTHROMBIN TIME: CPT

## 2025-01-28 PROCEDURE — 96365 THER/PROPH/DIAG IV INF INIT: CPT

## 2025-01-28 PROCEDURE — 83735 ASSAY OF MAGNESIUM: CPT

## 2025-01-28 PROCEDURE — 99285 EMERGENCY DEPT VISIT HI MDM: CPT

## 2025-01-28 PROCEDURE — 84484 ASSAY OF TROPONIN QUANT: CPT

## 2025-01-28 PROCEDURE — 87086 URINE CULTURE/COLONY COUNT: CPT

## 2025-01-28 PROCEDURE — 87636 SARSCOV2 & INF A&B AMP PRB: CPT

## 2025-01-28 PROCEDURE — 70450 CT HEAD/BRAIN W/O DYE: CPT

## 2025-01-28 PROCEDURE — 85730 THROMBOPLASTIN TIME PARTIAL: CPT

## 2025-01-28 PROCEDURE — 81001 URINALYSIS AUTO W/SCOPE: CPT

## 2025-01-28 PROCEDURE — 6360000002 HC RX W HCPCS: Performed by: EMERGENCY MEDICINE

## 2025-01-28 PROCEDURE — 93005 ELECTROCARDIOGRAM TRACING: CPT | Performed by: NURSE PRACTITIONER

## 2025-01-28 PROCEDURE — 71046 X-RAY EXAM CHEST 2 VIEWS: CPT

## 2025-01-28 PROCEDURE — 85025 COMPLETE CBC W/AUTO DIFF WBC: CPT

## 2025-01-28 PROCEDURE — 80053 COMPREHEN METABOLIC PANEL: CPT

## 2025-01-28 PROCEDURE — 83605 ASSAY OF LACTIC ACID: CPT

## 2025-01-28 PROCEDURE — 96366 THER/PROPH/DIAG IV INF ADDON: CPT

## 2025-01-28 PROCEDURE — 83690 ASSAY OF LIPASE: CPT

## 2025-01-28 RX ORDER — MAGNESIUM SULFATE IN WATER 40 MG/ML
2000 INJECTION, SOLUTION INTRAVENOUS ONCE
Status: COMPLETED | OUTPATIENT
Start: 2025-01-28 | End: 2025-01-28

## 2025-01-28 RX ADMIN — MAGNESIUM SULFATE HEPTAHYDRATE 2000 MG: 2 INJECTION, SOLUTION INTRAVENOUS at 13:15

## 2025-01-28 ASSESSMENT — PAIN SCALES - GENERAL: PAINLEVEL_OUTOF10: 7

## 2025-01-28 ASSESSMENT — PAIN DESCRIPTION - FREQUENCY: FREQUENCY: CONTINUOUS

## 2025-01-28 ASSESSMENT — PAIN DESCRIPTION - LOCATION: LOCATION: GENERALIZED

## 2025-01-28 ASSESSMENT — PAIN DESCRIPTION - PAIN TYPE: TYPE: ACUTE PAIN

## 2025-01-28 ASSESSMENT — PAIN DESCRIPTION - ONSET: ONSET: ON-GOING

## 2025-01-28 ASSESSMENT — PAIN - FUNCTIONAL ASSESSMENT
PAIN_FUNCTIONAL_ASSESSMENT: 0-10
PAIN_FUNCTIONAL_ASSESSMENT: NONE - DENIES PAIN

## 2025-01-28 ASSESSMENT — PAIN DESCRIPTION - DESCRIPTORS: DESCRIPTORS: ACHING;DISCOMFORT;SORE

## 2025-01-28 NOTE — ED PROVIDER NOTES
[facility] EMERGENCY DEPARTMENT  EMERGENCY DEPARTMENT ENCOUNTER        Pt Name: Reese Montana  MRN: [id number]  Birthdate 1935  Date of evaluation: 1/28/2025  Provider: Joellen Suárez DO  PCP: Shannan Mcgill MD  Note Started: 12:50 PM EST 1/28/25    CHIEF COMPLAINT       Chief Complaint   Patient presents with    Vomiting     Vomiting, dizzy unsteady started today       HISTORY OF PRESENT ILLNESS: 1 or more Elements   History From: patient and wife    Limitations to history : None    Reese Montana is a 89 y.o. male who presents with feeling dizzy, unsteady resulting in nausea and an episode of emesis after standing up from having a bowel movement on the toilet this afternoon.  He denies falling down or head injury, neck or back pain, focal paresthesias, focal weakness, slurred speech, difficulty word finding, difficulty breathing or swallowing.  He denies abdominal pain, chest pain, fever, cough or congestion.  The complaint has been persistent, moderate in severity, and worsened by nothing and much improved.  Patient denies fever/chills, sore throat, cough, congestion, chest pain, shortness of breath, edema, headache, visual disturbances, focal paresthesias, focal weakness, abdominal pain, diarrhea, constipation, dysuria, hematuria, trauma, neck or back pain, rash or other complaints.        Nursing Notes were all reviewed and agreed with or any disagreements were addressed in the HPI.    REVIEW OF SYSTEMS :           Positives and Pertinent negatives as per HPI.     SURGICAL HISTORY     Past Surgical History:   Procedure Laterality Date    CARDIAC SURGERY  07/06/2015    AORTIV VALVE REPLACEMENT & BYPASS    CYSTOSCOPY N/A 11/22/2022    CYSTOSCOPY, EVACUATION OF CLOTS AND FULGURATION performed by Onofre Colvin MD at [facility] OR    PACEMAKER PLACEMENT         CURRENTMEDICATIONS       Previous Medications    APIXABAN (ELIQUIS) 5 MG TABS TABLET    Take by mouth 2 times daily

## 2025-01-30 ENCOUNTER — APPOINTMENT (OUTPATIENT)
Dept: DERMATOLOGY | Facility: CLINIC | Age: 89
End: 2025-01-30
Payer: MEDICARE

## 2025-01-30 LAB
MICROORGANISM SPEC CULT: ABNORMAL
SERVICE CMNT-IMP: ABNORMAL
SPECIMEN DESCRIPTION: ABNORMAL

## 2025-01-31 LAB
EKG ATRIAL RATE: 76 BPM
EKG Q-T INTERVAL: 518 MS
EKG QRS DURATION: 218 MS
EKG QTC CALCULATION (BAZETT): 559 MS
EKG R AXIS: -70 DEGREES
EKG T AXIS: 96 DEGREES
EKG VENTRICULAR RATE: 70 BPM

## 2025-02-25 ENCOUNTER — APPOINTMENT (OUTPATIENT)
Dept: DERMATOLOGY | Facility: CLINIC | Age: 89
End: 2025-02-25
Payer: MEDICARE

## 2025-05-01 ENCOUNTER — HOSPITAL ENCOUNTER (EMERGENCY)
Age: 89
Discharge: HOME | End: 2025-05-01
Payer: OTHER GOVERNMENT

## 2025-05-01 ENCOUNTER — APPOINTMENT (OUTPATIENT)
Dept: GENERAL RADIOLOGY | Age: 89
End: 2025-05-01
Payer: OTHER GOVERNMENT

## 2025-05-01 VITALS
RESPIRATION RATE: 14 BRPM | OXYGEN SATURATION: 96 % | SYSTOLIC BLOOD PRESSURE: 138 MMHG | TEMPERATURE: 97.7 F | HEART RATE: 70 BPM | DIASTOLIC BLOOD PRESSURE: 54 MMHG
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
## 2025-05-01 DIAGNOSIS — M79.601 RIGHT ARM PAIN: ICD-10-CM

## 2025-05-01 PROCEDURE — 73070 X-RAY EXAM OF ELBOW: CPT

## 2025-05-01 PROCEDURE — 99283 EMERGENCY DEPT VISIT LOW MDM: CPT

## 2025-05-01 PROCEDURE — 73030 X-RAY EXAM OF SHOULDER: CPT

## 2025-05-01 PROCEDURE — 6370000000 HC RX 637 (ALT 250 FOR IP): Performed by: PHYSICIAN ASSISTANT

## 2025-05-01 PROCEDURE — 73130 X-RAY EXAM OF HAND: CPT

## 2025-05-01 PROCEDURE — 73110 X-RAY EXAM OF WRIST: CPT

## 2025-05-01 RX ORDER — ACETAMINOPHEN 500 MG
1000 TABLET ORAL ONCE
Status: COMPLETED | OUTPATIENT
Start: 2025-05-01 | End: 2025-05-01

## 2025-05-01 RX ADMIN — ACETAMINOPHEN 1000 MG: 500 TABLET ORAL at 18:21

## 2025-05-01 ASSESSMENT — PAIN DESCRIPTION - LOCATION: LOCATION: ARM;SHOULDER

## 2025-05-01 ASSESSMENT — PAIN SCALES - GENERAL: PAINLEVEL_OUTOF10: 10

## 2025-05-01 ASSESSMENT — PAIN - FUNCTIONAL ASSESSMENT: PAIN_FUNCTIONAL_ASSESSMENT: 0-10

## 2025-05-01 NOTE — ED PROVIDER NOTES
Independent COREY Visit.             [facility] EMERGENCY DEPARTMENT  ED  Encounter Note  Admit Date/RoomTime: 2025  5:25 PM  ED Room: [facility]  NAME: Reese Montana  : 1935  MRN: [id number]  PCP: Shannan Mcgill MD    CHIEF COMPLAINT     Fall (Bending over to pick something up and lost balance. R arm/wrist/hand pain, bruising. Fell )    HISTORY OF PRESENT ILLNESS        Reese Montana is a 89 y.o. male who presents to the ED by private vehicle for fall, beginning 4 day(s) ago. The complaint has been persistent and are moderate in severity.  The patient states that he was bending over to pick something up and lost his balance.  He fell to the ground landing on his right side and particularly his right upper extremity.  He states he did not hit his head or lose consciousness.  This happened 4 days ago.  He does have a large bruise on his right bicep region.  Reports ongoing pain particularly over the wrist and hand and elbow.  Reports increased pain with any use of the right upper extremity.  Denies headache or neck pain.  The patient is not on any blood thinners.      REVIEW OF SYSTEMS     Pertinent positives and negatives are stated within HPI, all other systems reviewed and are negative.    Past Medical History:  has a past medical history of Aortic valve replaced, Cancer (HCC), Diabetes mellitus (HCC), Elevated cholesterol, Hypertension, Pacemaker, Prostate enlargement, Pulmonary emboli (HCC), S/P CABG x 1, and TIA (transient ischemic attack).  Surgical History:  has a past surgical history that includes Cardiac surgery (2015); pacemaker placement; and Cystoscopy (N/A, 2022).  Social History:  reports that he has quit smoking. He has quit using smokeless tobacco. He reports current alcohol use. He reports that he does not use drugs.  Family History: family history is not on file.   Allergies: Patient has no known allergies.  CURRENT MEDICATIONS       Previous

## 2025-05-17 ENCOUNTER — HOSPITAL ENCOUNTER (EMERGENCY)
Age: 89
Discharge: HOME | End: 2025-05-17
Payer: OTHER GOVERNMENT

## 2025-05-17 ENCOUNTER — APPOINTMENT (OUTPATIENT)
Dept: CT IMAGING | Age: 89
End: 2025-05-17
Payer: OTHER GOVERNMENT

## 2025-05-17 ENCOUNTER — APPOINTMENT (OUTPATIENT)
Dept: GENERAL RADIOLOGY | Age: 89
End: 2025-05-17
Payer: OTHER GOVERNMENT

## 2025-05-17 VITALS
WEIGHT: 155 LBS | OXYGEN SATURATION: 96 % | BODY MASS INDEX: 23.57 KG/M2 | SYSTOLIC BLOOD PRESSURE: 125 MMHG | RESPIRATION RATE: 20 BRPM | TEMPERATURE: 97.6 F | HEART RATE: 70 BPM | DIASTOLIC BLOOD PRESSURE: 57 MMHG

## 2025-05-17 DIAGNOSIS — T07.XXXA MULTIPLE CONTUSIONS: ICD-10-CM

## 2025-05-17 DIAGNOSIS — S09.90XA CLOSED HEAD INJURY, INITIAL ENCOUNTER: ICD-10-CM
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
## 2025-05-17 DIAGNOSIS — M25.532 LEFT WRIST PAIN: ICD-10-CM

## 2025-05-17 DIAGNOSIS — S51.811A SKIN TEAR OF FOREARM WITHOUT COMPLICATION, RIGHT, INITIAL ENCOUNTER: ICD-10-CM

## 2025-05-17 PROCEDURE — 6370000000 HC RX 637 (ALT 250 FOR IP): Performed by: NURSE PRACTITIONER

## 2025-05-17 PROCEDURE — 73060 X-RAY EXAM OF HUMERUS: CPT

## 2025-05-17 PROCEDURE — 70450 CT HEAD/BRAIN W/O DYE: CPT

## 2025-05-17 PROCEDURE — 99284 EMERGENCY DEPT VISIT MOD MDM: CPT

## 2025-05-17 PROCEDURE — 72125 CT NECK SPINE W/O DYE: CPT

## 2025-05-17 PROCEDURE — 73110 X-RAY EXAM OF WRIST: CPT

## 2025-05-17 PROCEDURE — 12001 RPR S/N/AX/GEN/TRNK 2.5CM/<: CPT

## 2025-05-17 PROCEDURE — 70486 CT MAXILLOFACIAL W/O DYE: CPT

## 2025-05-17 PROCEDURE — 29125 APPL SHORT ARM SPLINT STATIC: CPT

## 2025-05-17 RX ORDER — TRAMADOL HYDROCHLORIDE 50 MG/1
50 TABLET ORAL EVERY 12 HOURS PRN
Qty: 12 TABLET | Refills: 0 | Status: SHIPPED | OUTPATIENT
Start: 2025-05-17 | End: 2025-05-23

## 2025-05-17 RX ORDER — HYDROCODONE BITARTRATE AND ACETAMINOPHEN 5; 325 MG/1; MG/1
1 TABLET ORAL ONCE
Status: COMPLETED | OUTPATIENT
Start: 2025-05-17 | End: 2025-05-17

## 2025-05-17 RX ADMIN — HYDROCODONE BITARTRATE AND ACETAMINOPHEN 1 TABLET: 5; 325 TABLET ORAL at 11:04

## 2025-05-17 ASSESSMENT — PAIN DESCRIPTION - ONSET: ONSET: SUDDEN

## 2025-05-17 ASSESSMENT — PAIN DESCRIPTION - PAIN TYPE
TYPE: ACUTE PAIN
TYPE: ACUTE PAIN

## 2025-05-17 ASSESSMENT — PAIN SCALES - GENERAL
PAINLEVEL_OUTOF10: 10

## 2025-05-17 ASSESSMENT — PAIN DESCRIPTION - LOCATION: LOCATION: WRIST

## 2025-05-17 ASSESSMENT — PAIN DESCRIPTION - FREQUENCY: FREQUENCY: CONTINUOUS

## 2025-05-17 ASSESSMENT — PAIN - FUNCTIONAL ASSESSMENT: PAIN_FUNCTIONAL_ASSESSMENT: 0-10

## 2025-05-17 ASSESSMENT — PAIN DESCRIPTION - DESCRIPTORS
DESCRIPTORS: SORE;ACHING;DISCOMFORT
DESCRIPTORS: DISCOMFORT;STABBING

## 2025-05-17 ASSESSMENT — PAIN DESCRIPTION - ORIENTATION
ORIENTATION: LEFT
ORIENTATION: LEFT;RIGHT

## 2025-05-17 NOTE — ED PROVIDER NOTES
[facility]  Department of Emergency Medicine   ED  Encounter Note  Admit Date/RoomTime: 2025 10:11 AM  ED Room: CEE/CEE    NAME: Reese Montana  : 1935  MRN: [id number]     Chief Complaint:  Fall (Fell 2 weeks ago and then again on Thursday.  Pain in bilateral arms.  Skin tear to right arm.  Redness and swelling to left wrist and hand.  Pt on blood thinner and did hit his head.  Right eye ecchymotic.  Abrasion to right side of face next to the eye.  -LOC.)    History of Present Illness       Reese Montana is a 89 y.o. old male who presents to the emergency department by private vehicle, for a mechanical fall which occured 2 day(s) prior to arrival. He reportedly was outside trying to  leaves and bent over he states when he bent over he lost his balance and went straight forward hitting his head and injuring his right upper arm and left wrist while at home.   The patients tetanus status is up to date.   Since onset the symptoms have been stable.  His pain is aggraveated by any movement and relieved by nothing, as no treatment has been provided prior to this visit.  He denies any loss of consciousness, neck pain, chest pain, abdominal pain, back pain, numbness, weakness, blurred vision, nausea, or vomiting.  Has been taking Tylenol at home for his pain with no relief  He takes Plavix and Eliquis.  Tetanus up-to-date    ROS   Pertinent positives and negatives are stated within HPI, all other systems reviewed and are negative.    Past Medical History:  has a past medical history of Aortic valve replaced, Cancer (HCC), Diabetes mellitus (HCC), Elevated cholesterol, Hypertension, Pacemaker, Prostate enlargement, Pulmonary emboli (HCC), S/P CABG x 1, and TIA (transient ischemic attack).    Surgical History:  has a past surgical history that includes Cardiac surgery (2015); pacemaker placement; and Cystoscopy (N/A, 2022).    Social History:  reports that he has quit

## (undated) DEVICE — 4-PORT MANIFOLD: Brand: NEPTUNE 2

## (undated) DEVICE — STRAP,CATHETER,ADJBL FOAM,HOOK&LOOP: Brand: MEDLINE

## (undated) DEVICE — TUBING, SUCTION, 3/16" X 12', STRAIGHT: Brand: MEDLINE

## (undated) DEVICE — SOLUTION IRRIG 3000ML 1.5% GL USP UROMATIC CONT

## (undated) DEVICE — GOWN,SIRUS,FABRNF,XL,20/CS: Brand: MEDLINE

## (undated) DEVICE — ELECTRODE ES 28FR WIRE 0.012IN BLU R ANG CUT LOOP STBL FOR

## (undated) DEVICE — SOLUTION IV IRRIG WATER 1000ML POUR BRL 2F7114

## (undated) DEVICE — CYSTO PACK: Brand: MEDLINE INDUSTRIES, INC.

## (undated) DEVICE — HOSE CONN FOR WST MGMT SYS NEPTUNE 2

## (undated) DEVICE — CATHETER URETH 24FR BLLN 30CC SIL ALLY W/ SIL HYDRGEL 3 W F

## (undated) DEVICE — CATHETER ETER URET L65CM OD5FR OLV TIP

## (undated) DEVICE — SOLUTION SURG PREP ANTIMICROBIAL 4 OZ SKIN WND EXIDINE

## (undated) DEVICE — BAG DRNGE COMB PK

## (undated) DEVICE — DRAINBAG,ANTI-REFLUX TOWER,L/F,2000ML,LL: Brand: MEDLINE

## (undated) DEVICE — GAUZE,SPONGE,4"X4",8PLY,STRL,LF,10/TRAY: Brand: MEDLINE

## (undated) DEVICE — SOLUTION IRRIG 3000ML STRL H2O USP UROMATIC PLAS CONT

## (undated) DEVICE — SOLUTION IRRIG 3000ML 0.9% SOD CHL USP UROMATIC PLAS CONT

## (undated) DEVICE — ELECTRODE ES VPR FOR USA ELITE SYS

## (undated) DEVICE — GLOVE ORANGE PI 7 1/2   MSG9075

## (undated) DEVICE — MEDICINE CUP, GRADUATED, STER: Brand: MEDLINE